# Patient Record
Sex: MALE | Race: WHITE | NOT HISPANIC OR LATINO | Employment: OTHER | URBAN - METROPOLITAN AREA
[De-identification: names, ages, dates, MRNs, and addresses within clinical notes are randomized per-mention and may not be internally consistent; named-entity substitution may affect disease eponyms.]

---

## 2017-01-04 ENCOUNTER — TELEPHONE (OUTPATIENT)
Dept: NEPHROLOGY | Facility: CLINIC | Age: 72
End: 2017-01-04

## 2017-01-04 DIAGNOSIS — N17.9 ACUTE RENAL FAILURE, UNSPECIFIED ACUTE RENAL FAILURE TYPE: Primary | ICD-10-CM

## 2017-01-04 NOTE — TELEPHONE ENCOUNTER
Patient will get labs on Feb 1.  Requested order for RP mailed to his home.  On MD desk for signature with addressed envelope.

## 2017-01-21 LAB
ALBUMIN SERPL-MCNC: 4.7 G/DL (ref 3.5–4.8)
BUN SERPL-MCNC: 18 MG/DL (ref 8–27)
BUN/CREAT SERPL: 12 (ref 10–22)
CALCIUM SERPL-MCNC: 9.3 MG/DL (ref 8.6–10.2)
CHLORIDE SERPL-SCNC: 101 MMOL/L (ref 96–106)
CO2 SERPL-SCNC: 22 MMOL/L (ref 18–29)
CREAT SERPL-MCNC: 1.53 MG/DL (ref 0.76–1.27)
GLUCOSE SERPL-MCNC: 115 MG/DL (ref 65–99)
PHOSPHATE SERPL-MCNC: 3.4 MG/DL (ref 2.5–4.5)
POTASSIUM SERPL-SCNC: 4.8 MMOL/L (ref 3.5–5.2)
SODIUM SERPL-SCNC: 139 MMOL/L (ref 134–144)

## 2017-01-24 ENCOUNTER — PATIENT MESSAGE (OUTPATIENT)
Dept: NEPHROLOGY | Facility: CLINIC | Age: 72
End: 2017-01-24

## 2017-01-24 ENCOUNTER — DOCUMENTATION ONLY (OUTPATIENT)
Dept: NEPHROLOGY | Facility: CLINIC | Age: 72
End: 2017-01-24

## 2017-01-24 NOTE — PROGRESS NOTES
[facility]  1/20/17    Hgb 14.3  Scr 1.47  Na 139  K 4.7  CO 21  Ca 9.3  Alb 4.5    HDL 38  LDL ---  PSA 0.5

## 2018-02-12 ENCOUNTER — PATIENT MESSAGE (OUTPATIENT)
Dept: NEPHROLOGY | Facility: CLINIC | Age: 73
End: 2018-02-12

## 2020-01-02 ENCOUNTER — TELEPHONE (OUTPATIENT)
Dept: NEPHROLOGY | Facility: CLINIC | Age: 75
End: 2020-01-02

## 2020-01-02 NOTE — TELEPHONE ENCOUNTER
----- Message from Scott Cano MD sent at 1/1/2020  8:38 AM CST -----  Please obtain his recent [facility] labs and MRI of his head at [facility] in [place]

## 2020-01-02 NOTE — TELEPHONE ENCOUNTER
Date Name Specimen Result Interpretation Description Value Range Status Address     12/19/2019 PTH (Parathyroid Hormone), Intact, Serum or Plasma       PTH, Intact 20 pg/mL 15-65 pg/mL Final [facility]: [id number], [place]      Glucose, Fingerstick, Blood

## 2020-01-06 ENCOUNTER — TELEPHONE (OUTPATIENT)
Dept: NEPHROLOGY | Facility: CLINIC | Age: 75
End: 2020-01-06

## 2020-01-06 NOTE — TELEPHONE ENCOUNTER
Spoke to Kiki at [facility] and she will have to request labs done over 6 months in their archives, if found, they will be faxed.

## 2020-01-06 NOTE — TELEPHONE ENCOUNTER
----- Message from Scott Cano MD sent at 1/6/2020  6:06 AM CST -----  Can we get his latest three sets of  [facility] labs for review?

## 2020-01-06 NOTE — TELEPHONE ENCOUNTER
----- Message from Scott Cano MD sent at 1/3/2020  7:54 PM CST -----  Please get his latest labs from [facility]

## 2020-01-08 ENCOUNTER — DOCUMENTATION ONLY (OUTPATIENT)
Dept: NEPHROLOGY | Facility: CLINIC | Age: 75
End: 2020-01-08

## 2020-01-14 NOTE — TELEPHONE ENCOUNTER
I spoke to him by phone.  I would simply recheck a renal panel and get a UA.  They will get this at Dr. Medeiros's office.  He will order.

## 2020-02-06 ENCOUNTER — DOCUMENTATION ONLY (OUTPATIENT)
Dept: NEPHROLOGY | Facility: CLINIC | Age: 75
End: 2020-02-06

## 2020-02-07 ENCOUNTER — OFFICE VISIT (OUTPATIENT)
Dept: NEPHROLOGY | Facility: CLINIC | Age: 75
End: 2020-02-07
Payer: MEDICARE

## 2020-02-07 VITALS
OXYGEN SATURATION: 98 % | HEART RATE: 62 BPM | WEIGHT: 171.94 LBS | HEIGHT: 69 IN | BODY MASS INDEX: 25.47 KG/M2 | DIASTOLIC BLOOD PRESSURE: 82 MMHG | SYSTOLIC BLOOD PRESSURE: 128 MMHG

## 2020-02-07 DIAGNOSIS — I10 ESSENTIAL HYPERTENSION: ICD-10-CM

## 2020-02-07 DIAGNOSIS — N20.0 NEPHROLITHIASIS: ICD-10-CM

## 2020-02-07 DIAGNOSIS — N18.30 CHRONIC KIDNEY DISEASE, STAGE III (MODERATE): Primary | ICD-10-CM

## 2020-02-07 PROCEDURE — 3074F PR MOST RECENT SYSTOLIC BLOOD PRESSURE < 130 MM HG: ICD-10-PCS | Mod: CPTII,S$GLB,, | Performed by: INTERNAL MEDICINE

## 2020-02-07 PROCEDURE — 99204 OFFICE O/P NEW MOD 45 MIN: CPT | Mod: S$GLB,,, | Performed by: INTERNAL MEDICINE

## 2020-02-07 PROCEDURE — 99204 PR OFFICE/OUTPT VISIT, NEW, LEVL IV, 45-59 MIN: ICD-10-PCS | Mod: S$GLB,,, | Performed by: INTERNAL MEDICINE

## 2020-02-07 PROCEDURE — 1159F PR MEDICATION LIST DOCUMENTED IN MEDICAL RECORD: ICD-10-PCS | Mod: S$GLB,,, | Performed by: INTERNAL MEDICINE

## 2020-02-07 PROCEDURE — 99999 PR PBB SHADOW E&M-EST. PATIENT-LVL III: ICD-10-PCS | Mod: PBBFAC,,, | Performed by: INTERNAL MEDICINE

## 2020-02-07 PROCEDURE — 99999 PR PBB SHADOW E&M-EST. PATIENT-LVL III: CPT | Mod: PBBFAC,,, | Performed by: INTERNAL MEDICINE

## 2020-02-07 PROCEDURE — 3079F DIAST BP 80-89 MM HG: CPT | Mod: CPTII,S$GLB,, | Performed by: INTERNAL MEDICINE

## 2020-02-07 PROCEDURE — 3079F PR MOST RECENT DIASTOLIC BLOOD PRESSURE 80-89 MM HG: ICD-10-PCS | Mod: CPTII,S$GLB,, | Performed by: INTERNAL MEDICINE

## 2020-02-07 PROCEDURE — 1159F MED LIST DOCD IN RCRD: CPT | Mod: S$GLB,,, | Performed by: INTERNAL MEDICINE

## 2020-02-07 PROCEDURE — 3074F SYST BP LT 130 MM HG: CPT | Mod: CPTII,S$GLB,, | Performed by: INTERNAL MEDICINE

## 2020-02-07 RX ORDER — EZETIMIBE 10 MG/1
10 TABLET ORAL DAILY
COMMUNITY
End: 2021-06-29 | Stop reason: SDUPTHER

## 2020-02-07 RX ORDER — CHOLECALCIFEROL (VITAMIN D3) 25 MCG
1000 TABLET ORAL DAILY
COMMUNITY

## 2020-02-07 RX ORDER — CLOPIDOGREL BISULFATE 75 MG/1
75 TABLET ORAL DAILY
COMMUNITY
End: 2020-05-30 | Stop reason: CLARIF

## 2020-02-07 RX ORDER — GEMFIBROZIL 600 MG/1
600 TABLET, FILM COATED ORAL
COMMUNITY
End: 2021-06-29 | Stop reason: SDUPTHER

## 2020-02-07 RX ORDER — TELMISARTAN 40 MG/1
40 TABLET ORAL DAILY
COMMUNITY

## 2020-02-07 NOTE — PROGRESS NOTES
"Subjective:       Patient ID: Ruy Ramos Jr. is a 75 y.o. White male who presents for return patient evaluation for chronic renal failure.    He has been lost to follow-up since 2017.  He was recently found to have a Scr of 2.3 and also has a carotid artery angiogram next week planned.  He had more than one episode of "passing out" and his evaluation revealed a lesion.  He has no uremic or urinary symptoms and is in his usual state of health.        Review of Systems   Constitutional: Negative for appetite change, chills and fever.   HENT: Negative for congestion.    Eyes: Positive for visual disturbance.   Respiratory: Negative for cough and shortness of breath.    Cardiovascular: Negative for chest pain and leg swelling.   Gastrointestinal: Negative for abdominal pain, diarrhea, nausea and vomiting.   Genitourinary: Negative for difficulty urinating, dysuria and hematuria.   Musculoskeletal: Negative for myalgias.   Skin: Negative for rash.   Neurological: Positive for numbness (feet). Negative for headaches.   Psychiatric/Behavioral: Negative for sleep disturbance.       The past medical, family and social histories were reviewed for this encounter.     /82   Pulse 62   Ht 5' 9" (1.753 m)   Wt 78 kg (171 lb 15.3 oz)   SpO2 98%   BMI 25.39 kg/m²     Objective:      Physical Exam   Constitutional: He appears well-developed and well-nourished. No distress.   HENT:   Head: Normocephalic and atraumatic.   Eyes: Conjunctivae are normal. No scleral icterus.   Neck: Normal range of motion. No JVD present.   Cardiovascular: Normal rate, regular rhythm and normal heart sounds. Exam reveals no gallop and no friction rub.   No murmur heard.  Pulmonary/Chest: Effort normal and breath sounds normal. No respiratory distress. He has no wheezes.   Abdominal: Soft. Bowel sounds are normal. He exhibits no distension. There is no tenderness.   Musculoskeletal: He exhibits no edema.   Skin: Skin is warm and dry. " No rash noted.   Psychiatric: He has a normal mood and affect.   Vitals reviewed.      Assessment:       1. Chronic kidney disease, stage III (moderate)    2. Nephrolithiasis    3. Essential hypertension        Plan:   Return to clinic in 4 months.  Labs for next visit include rp, pth,   Rp, pth, ua, upc today.  He may get these at [place] if he likes.  Baseline creatinine is 1.5.  Please obtain his renal US report from Dr. Mejias.  I would hold the metformin given his GFR.  It is unclear to me if this is acute or chronic.  Recheck labs today and get a urine on him.  Blood pressure is controlled on the current regimen.  Continue current medications as prescribed and reviewed.   Please have patient follow-up with your PCP or Endocrinology regarding the control and management of diabetes.  His upcoming angiogram should not pose a large threat to his renal function but he will receive a small amount of dye.  We discussed this.

## 2020-03-05 ENCOUNTER — HOSPITAL ENCOUNTER (OUTPATIENT)
Dept: RADIOLOGY | Facility: HOSPITAL | Age: 75
Discharge: HOME | End: 2020-03-05
Attending: SPECIALIST
Payer: MEDICARE

## 2020-03-05 DIAGNOSIS — N20.0 URIC ACID NEPHROLITHIASIS: ICD-10-CM

## 2020-03-05 DIAGNOSIS — N20.0 URIC ACID NEPHROLITHIASIS: Primary | ICD-10-CM

## 2020-03-05 PROCEDURE — 74018 RADEX ABDOMEN 1 VIEW: CPT | Mod: TC,PO

## 2020-05-30 ENCOUNTER — ANESTHESIA (OUTPATIENT)
Dept: SURGERY | Facility: HOSPITAL | Age: 75
DRG: 853 | End: 2020-05-30
Payer: MEDICARE

## 2020-05-30 ENCOUNTER — HOSPITAL ENCOUNTER (INPATIENT)
Facility: HOSPITAL | Age: 75
LOS: 4 days | Discharge: HOME | DRG: 853 | End: 2020-06-03
Attending: EMERGENCY MEDICINE | Admitting: INTERNAL MEDICINE
Payer: MEDICARE

## 2020-05-30 ENCOUNTER — ANESTHESIA EVENT (OUTPATIENT)
Dept: SURGERY | Facility: HOSPITAL | Age: 75
DRG: 853 | End: 2020-05-30
Payer: MEDICARE

## 2020-05-30 DIAGNOSIS — K35.33 ACUTE APPENDICITIS WITH PERFORATION, LOCALIZED PERITONITIS, AND ABSCESS, WITHOUT GANGRENE: ICD-10-CM

## 2020-05-30 DIAGNOSIS — R65.20 SEVERE SEPSIS: Primary | ICD-10-CM

## 2020-05-30 DIAGNOSIS — Z01.811 PRE-OP CHEST EXAM: ICD-10-CM

## 2020-05-30 DIAGNOSIS — A41.9 SEVERE SEPSIS: Primary | ICD-10-CM

## 2020-05-30 DIAGNOSIS — K35.80 ACUTE APPENDICITIS: ICD-10-CM

## 2020-05-30 DIAGNOSIS — I48.91 A-FIB: ICD-10-CM

## 2020-05-30 DIAGNOSIS — R07.9 CHEST PAIN: ICD-10-CM

## 2020-05-30 DIAGNOSIS — R61 DIAPHORESIS: ICD-10-CM

## 2020-05-30 PROBLEM — K35.32 ACUTE APPENDICITIS WITH PERFORATION AND LOCALIZED PERITONITIS, WITHOUT ABSCESS OR GANGRENE: Status: ACTIVE | Noted: 2020-05-30

## 2020-05-30 PROBLEM — K37 APPENDICITIS: Status: ACTIVE | Noted: 2020-05-30

## 2020-05-30 PROBLEM — N17.9 AKI (ACUTE KIDNEY INJURY): Status: ACTIVE | Noted: 2020-05-30

## 2020-05-30 LAB
ALBUMIN SERPL BCP-MCNC: 4 G/DL (ref 3.5–5.2)
ALP SERPL-CCNC: 95 U/L (ref 55–135)
ALT SERPL W/O P-5'-P-CCNC: 16 U/L (ref 10–44)
ANION GAP SERPL CALC-SCNC: 13 MMOL/L (ref 8–16)
AST SERPL-CCNC: 17 U/L (ref 10–40)
BASOPHILS # BLD AUTO: 0.03 K/UL (ref 0–0.2)
BASOPHILS NFR BLD: 0.2 % (ref 0–1.9)
BILIRUB SERPL-MCNC: 0.7 MG/DL (ref 0.1–1)
BNP SERPL-MCNC: 36 PG/ML (ref 0–99)
BUN SERPL-MCNC: 37 MG/DL (ref 8–23)
CALCIUM SERPL-MCNC: 9.4 MG/DL (ref 8.7–10.5)
CHLORIDE SERPL-SCNC: 102 MMOL/L (ref 95–110)
CO2 SERPL-SCNC: 19 MMOL/L (ref 23–29)
CREAT SERPL-MCNC: 2.1 MG/DL (ref 0.5–1.4)
DIFFERENTIAL METHOD: ABNORMAL
EOSINOPHIL # BLD AUTO: 0 K/UL (ref 0–0.5)
EOSINOPHIL NFR BLD: 0 % (ref 0–8)
ERYTHROCYTE [DISTWIDTH] IN BLOOD BY AUTOMATED COUNT: 15.3 % (ref 11.5–14.5)
EST. GFR  (AFRICAN AMERICAN): 34.1 ML/MIN/1.73 M^2
EST. GFR  (NON AFRICAN AMERICAN): 29.5 ML/MIN/1.73 M^2
GLUCOSE SERPL-MCNC: 163 MG/DL (ref 70–110)
GLUCOSE SERPL-MCNC: 182 MG/DL (ref 70–110)
GLUCOSE SERPL-MCNC: 184 MG/DL (ref 70–110)
HCT VFR BLD AUTO: 41.4 % (ref 40–54)
HGB BLD-MCNC: 13.5 G/DL (ref 14–18)
IMM GRANULOCYTES # BLD AUTO: 0.07 K/UL (ref 0–0.04)
IMM GRANULOCYTES NFR BLD AUTO: 0.6 % (ref 0–0.5)
LACTATE SERPL-SCNC: 1.5 MMOL/L (ref 0.5–1.9)
LACTATE SERPL-SCNC: 2 MMOL/L (ref 0.5–1.9)
LIPASE SERPL-CCNC: 26 U/L (ref 4–60)
LYMPHOCYTES # BLD AUTO: 0.9 K/UL (ref 1–4.8)
LYMPHOCYTES NFR BLD: 7.6 % (ref 18–48)
MCH RBC QN AUTO: 29.5 PG (ref 27–31)
MCHC RBC AUTO-ENTMCNC: 32.6 G/DL (ref 32–36)
MCV RBC AUTO: 90 FL (ref 82–98)
MONOCYTES # BLD AUTO: 1 K/UL (ref 0.3–1)
MONOCYTES NFR BLD: 7.9 % (ref 4–15)
NEUTROPHILS # BLD AUTO: 10.2 K/UL (ref 1.8–7.7)
NEUTROPHILS NFR BLD: 83.7 % (ref 38–73)
NRBC BLD-RTO: 0 /100 WBC
PLATELET # BLD AUTO: 210 K/UL (ref 150–350)
PMV BLD AUTO: 11.1 FL (ref 9.2–12.9)
POTASSIUM SERPL-SCNC: 4.4 MMOL/L (ref 3.5–5.1)
PROT SERPL-MCNC: 8.1 G/DL (ref 6–8.4)
RBC # BLD AUTO: 4.58 M/UL (ref 4.6–6.2)
SARS-COV-2 RDRP RESP QL NAA+PROBE: NEGATIVE
SODIUM SERPL-SCNC: 134 MMOL/L (ref 136–145)
WBC # BLD AUTO: 12.22 K/UL (ref 3.9–12.7)

## 2020-05-30 PROCEDURE — 27000284 HC CANNULA NASAL: Performed by: ANESTHESIOLOGY

## 2020-05-30 PROCEDURE — 63600175 PHARM REV CODE 636 W HCPCS: Performed by: NURSE ANESTHETIST, CERTIFIED REGISTERED

## 2020-05-30 PROCEDURE — 82962 GLUCOSE BLOOD TEST: CPT | Performed by: SURGERY

## 2020-05-30 PROCEDURE — 27000671 HC TUBING MICROBORE EXT: Performed by: ANESTHESIOLOGY

## 2020-05-30 PROCEDURE — 27201423 OPTIME MED/SURG SUP & DEVICES STERILE SUPPLY: Performed by: SURGERY

## 2020-05-30 PROCEDURE — 44950 APPENDECTOMY: CPT | Mod: ,,, | Performed by: SURGERY

## 2020-05-30 PROCEDURE — 71000033 HC RECOVERY, INTIAL HOUR: Performed by: SURGERY

## 2020-05-30 PROCEDURE — C9290 INJ, BUPIVACAINE LIPOSOME: HCPCS | Performed by: SURGERY

## 2020-05-30 PROCEDURE — 87185 SC STD ENZYME DETCJ PER NZM: CPT

## 2020-05-30 PROCEDURE — 85025 COMPLETE CBC W/AUTO DIFF WBC: CPT

## 2020-05-30 PROCEDURE — U0002 COVID-19 LAB TEST NON-CDC: HCPCS

## 2020-05-30 PROCEDURE — 36415 COLL VENOUS BLD VENIPUNCTURE: CPT

## 2020-05-30 PROCEDURE — 83605 ASSAY OF LACTIC ACID: CPT | Mod: 91

## 2020-05-30 PROCEDURE — 25000003 PHARM REV CODE 250: Performed by: SURGERY

## 2020-05-30 PROCEDURE — S0028 INJECTION, FAMOTIDINE, 20 MG: HCPCS | Performed by: NURSE ANESTHETIST, CERTIFIED REGISTERED

## 2020-05-30 PROCEDURE — 63600175 PHARM REV CODE 636 W HCPCS: Performed by: SURGERY

## 2020-05-30 PROCEDURE — 63600175 PHARM REV CODE 636 W HCPCS: Performed by: NURSE PRACTITIONER

## 2020-05-30 PROCEDURE — 21000000 HC CCU ICU ROOM CHARGE

## 2020-05-30 PROCEDURE — 25000003 PHARM REV CODE 250: Performed by: NURSE ANESTHETIST, CERTIFIED REGISTERED

## 2020-05-30 PROCEDURE — 99285 EMERGENCY DEPT VISIT HI MDM: CPT | Mod: 25

## 2020-05-30 PROCEDURE — 87040 BLOOD CULTURE FOR BACTERIA: CPT | Mod: 59

## 2020-05-30 PROCEDURE — 37000009 HC ANESTHESIA EA ADD 15 MINS: Performed by: SURGERY

## 2020-05-30 PROCEDURE — 37000008 HC ANESTHESIA 1ST 15 MINUTES: Performed by: SURGERY

## 2020-05-30 PROCEDURE — 27202103: Performed by: ANESTHESIOLOGY

## 2020-05-30 PROCEDURE — 25000003 PHARM REV CODE 250: Performed by: NURSE PRACTITIONER

## 2020-05-30 PROCEDURE — 96375 TX/PRO/DX INJ NEW DRUG ADDON: CPT

## 2020-05-30 PROCEDURE — 80053 COMPREHEN METABOLIC PANEL: CPT

## 2020-05-30 PROCEDURE — 27201107 HC STYLET, STANDARD: Performed by: ANESTHESIOLOGY

## 2020-05-30 PROCEDURE — 93005 ELECTROCARDIOGRAM TRACING: CPT | Performed by: INTERNAL MEDICINE

## 2020-05-30 PROCEDURE — 36000708 HC OR TIME LEV III 1ST 15 MIN: Performed by: SURGERY

## 2020-05-30 PROCEDURE — 44950 PR APPENDECTOMY: ICD-10-PCS | Mod: ,,, | Performed by: SURGERY

## 2020-05-30 PROCEDURE — 82962 GLUCOSE BLOOD TEST: CPT

## 2020-05-30 PROCEDURE — 25000003 PHARM REV CODE 250: Performed by: EMERGENCY MEDICINE

## 2020-05-30 PROCEDURE — 83880 ASSAY OF NATRIURETIC PEPTIDE: CPT

## 2020-05-30 PROCEDURE — 63600175 PHARM REV CODE 636 W HCPCS: Performed by: EMERGENCY MEDICINE

## 2020-05-30 PROCEDURE — 27202107 HC XP QUATRO SENSOR: Performed by: ANESTHESIOLOGY

## 2020-05-30 PROCEDURE — 36000709 HC OR TIME LEV III EA ADD 15 MIN: Performed by: SURGERY

## 2020-05-30 PROCEDURE — 96365 THER/PROPH/DIAG IV INF INIT: CPT

## 2020-05-30 PROCEDURE — 83690 ASSAY OF LIPASE: CPT

## 2020-05-30 PROCEDURE — 96361 HYDRATE IV INFUSION ADD-ON: CPT

## 2020-05-30 PROCEDURE — 88304 TISSUE EXAM BY PATHOLOGIST: CPT | Mod: TC

## 2020-05-30 PROCEDURE — 27000673 HC TUBING BLOOD Y: Performed by: ANESTHESIOLOGY

## 2020-05-30 RX ORDER — LIDOCAINE HYDROCHLORIDE 20 MG/ML
INJECTION, SOLUTION EPIDURAL; INFILTRATION; INTRACAUDAL; PERINEURAL
Status: DISCONTINUED | OUTPATIENT
Start: 2020-05-30 | End: 2020-05-30

## 2020-05-30 RX ORDER — ACETAMINOPHEN 10 MG/ML
INJECTION, SOLUTION INTRAVENOUS
Status: DISCONTINUED | OUTPATIENT
Start: 2020-05-30 | End: 2020-05-30

## 2020-05-30 RX ORDER — ROCURONIUM BROMIDE 10 MG/ML
INJECTION, SOLUTION INTRAVENOUS
Status: DISCONTINUED | OUTPATIENT
Start: 2020-05-30 | End: 2020-05-30

## 2020-05-30 RX ORDER — NEOSTIGMINE METHYLSULFATE 1 MG/ML
INJECTION, SOLUTION INTRAVENOUS
Status: DISCONTINUED | OUTPATIENT
Start: 2020-05-30 | End: 2020-05-30

## 2020-05-30 RX ORDER — BUPIVACAINE HYDROCHLORIDE AND EPINEPHRINE 2.5; 5 MG/ML; UG/ML
INJECTION, SOLUTION EPIDURAL; INFILTRATION; INTRACAUDAL; PERINEURAL
Status: DISCONTINUED | OUTPATIENT
Start: 2020-05-30 | End: 2020-05-30 | Stop reason: HOSPADM

## 2020-05-30 RX ORDER — SODIUM CHLORIDE 0.9 % (FLUSH) 0.9 %
10 SYRINGE (ML) INJECTION
Status: DISCONTINUED | OUTPATIENT
Start: 2020-05-30 | End: 2020-06-03 | Stop reason: HOSPADM

## 2020-05-30 RX ORDER — HYDROMORPHONE HYDROCHLORIDE 1 MG/ML
1 INJECTION, SOLUTION INTRAMUSCULAR; INTRAVENOUS; SUBCUTANEOUS EVERY 6 HOURS PRN
Status: DISCONTINUED | OUTPATIENT
Start: 2020-05-30 | End: 2020-06-03 | Stop reason: HOSPADM

## 2020-05-30 RX ORDER — FAMOTIDINE 10 MG/ML
INJECTION INTRAVENOUS
Status: DISCONTINUED | OUTPATIENT
Start: 2020-05-30 | End: 2020-05-30

## 2020-05-30 RX ORDER — GLYCOPYRROLATE 0.2 MG/ML
INJECTION INTRAMUSCULAR; INTRAVENOUS
Status: DISCONTINUED | OUTPATIENT
Start: 2020-05-30 | End: 2020-05-30

## 2020-05-30 RX ORDER — FLUCONAZOLE 2 MG/ML
100 INJECTION, SOLUTION INTRAVENOUS
Status: DISCONTINUED | OUTPATIENT
Start: 2020-05-31 | End: 2020-06-03 | Stop reason: HOSPADM

## 2020-05-30 RX ORDER — GLUCAGON 1 MG
1 KIT INJECTION
Status: DISCONTINUED | OUTPATIENT
Start: 2020-05-30 | End: 2020-06-03 | Stop reason: HOSPADM

## 2020-05-30 RX ORDER — SODIUM CHLORIDE 0.9 % (FLUSH) 0.9 %
10 SYRINGE (ML) INJECTION
Status: DISCONTINUED | OUTPATIENT
Start: 2020-05-31 | End: 2020-06-03 | Stop reason: HOSPADM

## 2020-05-30 RX ORDER — ONDANSETRON 2 MG/ML
4 INJECTION INTRAMUSCULAR; INTRAVENOUS DAILY PRN
Status: DISCONTINUED | OUTPATIENT
Start: 2020-05-30 | End: 2020-05-31

## 2020-05-30 RX ORDER — ACETAMINOPHEN 325 MG/1
650 TABLET ORAL EVERY 8 HOURS PRN
Status: DISCONTINUED | OUTPATIENT
Start: 2020-05-30 | End: 2020-06-03 | Stop reason: HOSPADM

## 2020-05-30 RX ORDER — IBUPROFEN 200 MG
16 TABLET ORAL
Status: DISCONTINUED | OUTPATIENT
Start: 2020-05-30 | End: 2020-06-03 | Stop reason: HOSPADM

## 2020-05-30 RX ORDER — OXYCODONE HYDROCHLORIDE 5 MG/1
5 TABLET ORAL
Status: DISCONTINUED | OUTPATIENT
Start: 2020-05-30 | End: 2020-05-31

## 2020-05-30 RX ORDER — IBUPROFEN 200 MG
24 TABLET ORAL
Status: DISCONTINUED | OUTPATIENT
Start: 2020-05-30 | End: 2020-06-03 | Stop reason: HOSPADM

## 2020-05-30 RX ORDER — INSULIN ASPART 100 [IU]/ML
0-5 INJECTION, SOLUTION INTRAVENOUS; SUBCUTANEOUS EVERY 6 HOURS PRN
Status: DISCONTINUED | OUTPATIENT
Start: 2020-05-30 | End: 2020-06-03 | Stop reason: HOSPADM

## 2020-05-30 RX ORDER — SUCCINYLCHOLINE CHLORIDE 20 MG/ML
INJECTION INTRAMUSCULAR; INTRAVENOUS
Status: DISCONTINUED | OUTPATIENT
Start: 2020-05-30 | End: 2020-05-30

## 2020-05-30 RX ORDER — HYDROMORPHONE HYDROCHLORIDE 1 MG/ML
0.5 INJECTION, SOLUTION INTRAMUSCULAR; INTRAVENOUS; SUBCUTANEOUS
Status: COMPLETED | OUTPATIENT
Start: 2020-05-30 | End: 2020-05-30

## 2020-05-30 RX ORDER — SODIUM CHLORIDE, SODIUM LACTATE, POTASSIUM CHLORIDE, CALCIUM CHLORIDE 600; 310; 30; 20 MG/100ML; MG/100ML; MG/100ML; MG/100ML
INJECTION, SOLUTION INTRAVENOUS CONTINUOUS PRN
Status: DISCONTINUED | OUTPATIENT
Start: 2020-05-30 | End: 2020-05-30

## 2020-05-30 RX ORDER — EPHEDRINE SULFATE 50 MG/ML
INJECTION, SOLUTION INTRAVENOUS
Status: DISCONTINUED | OUTPATIENT
Start: 2020-05-30 | End: 2020-05-30

## 2020-05-30 RX ORDER — DEXAMETHASONE SODIUM PHOSPHATE 4 MG/ML
INJECTION, SOLUTION INTRA-ARTICULAR; INTRALESIONAL; INTRAMUSCULAR; INTRAVENOUS; SOFT TISSUE
Status: DISCONTINUED | OUTPATIENT
Start: 2020-05-30 | End: 2020-05-30

## 2020-05-30 RX ORDER — SODIUM CHLORIDE 9 MG/ML
INJECTION, SOLUTION INTRAVENOUS CONTINUOUS
Status: DISCONTINUED | OUTPATIENT
Start: 2020-05-30 | End: 2020-06-01

## 2020-05-30 RX ORDER — PROPOFOL 10 MG/ML
VIAL (ML) INTRAVENOUS
Status: DISCONTINUED | OUTPATIENT
Start: 2020-05-30 | End: 2020-05-30

## 2020-05-30 RX ORDER — PHENYLEPHRINE HYDROCHLORIDE 10 MG/ML
INJECTION INTRAVENOUS
Status: DISCONTINUED | OUTPATIENT
Start: 2020-05-30 | End: 2020-05-30

## 2020-05-30 RX ORDER — MORPHINE SULFATE 2 MG/ML
1 INJECTION, SOLUTION INTRAMUSCULAR; INTRAVENOUS EVERY 4 HOURS PRN
Status: DISCONTINUED | OUTPATIENT
Start: 2020-05-30 | End: 2020-06-03 | Stop reason: HOSPADM

## 2020-05-30 RX ORDER — FENTANYL CITRATE 50 UG/ML
25 INJECTION, SOLUTION INTRAMUSCULAR; INTRAVENOUS
Status: DISCONTINUED | OUTPATIENT
Start: 2020-05-30 | End: 2020-05-31

## 2020-05-30 RX ORDER — PIOGLITAZONEHYDROCHLORIDE 15 MG/1
15 TABLET ORAL DAILY
COMMUNITY
End: 2021-09-28 | Stop reason: SDUPTHER

## 2020-05-30 RX ORDER — ONDANSETRON 2 MG/ML
INJECTION INTRAMUSCULAR; INTRAVENOUS
Status: DISCONTINUED | OUTPATIENT
Start: 2020-05-30 | End: 2020-05-30

## 2020-05-30 RX ORDER — DIPHENHYDRAMINE HYDROCHLORIDE 50 MG/ML
12.5 INJECTION INTRAMUSCULAR; INTRAVENOUS
Status: DISCONTINUED | OUTPATIENT
Start: 2020-05-30 | End: 2020-05-31

## 2020-05-30 RX ORDER — SODIUM CHLORIDE 9 MG/ML
INJECTION, SOLUTION INTRAVENOUS CONTINUOUS PRN
Status: DISCONTINUED | OUTPATIENT
Start: 2020-05-30 | End: 2020-05-30

## 2020-05-30 RX ORDER — ONDANSETRON 2 MG/ML
4 INJECTION INTRAMUSCULAR; INTRAVENOUS EVERY 8 HOURS PRN
Status: DISCONTINUED | OUTPATIENT
Start: 2020-05-30 | End: 2020-06-03 | Stop reason: HOSPADM

## 2020-05-30 RX ORDER — FENTANYL CITRATE 50 UG/ML
INJECTION, SOLUTION INTRAMUSCULAR; INTRAVENOUS
Status: DISCONTINUED | OUTPATIENT
Start: 2020-05-30 | End: 2020-05-30

## 2020-05-30 RX ORDER — MEPERIDINE HYDROCHLORIDE 50 MG/ML
12.5 INJECTION INTRAMUSCULAR; INTRAVENOUS; SUBCUTANEOUS EVERY 10 MIN PRN
Status: ACTIVE | OUTPATIENT
Start: 2020-05-30 | End: 2020-05-30

## 2020-05-30 RX ORDER — LIDOCAINE HYDROCHLORIDE 20 MG/ML
JELLY TOPICAL
Status: DISCONTINUED | OUTPATIENT
Start: 2020-05-30 | End: 2020-05-30

## 2020-05-30 RX ORDER — MIDAZOLAM HYDROCHLORIDE 1 MG/ML
INJECTION INTRAMUSCULAR; INTRAVENOUS
Status: DISCONTINUED | OUTPATIENT
Start: 2020-05-30 | End: 2020-05-30

## 2020-05-30 RX ADMIN — SODIUM CHLORIDE: 900 INJECTION, SOLUTION INTRAVENOUS at 09:05

## 2020-05-30 RX ADMIN — SODIUM CHLORIDE 1000 ML: 0.9 INJECTION, SOLUTION INTRAVENOUS at 03:05

## 2020-05-30 RX ADMIN — SODIUM CHLORIDE 1000 ML: 9 INJECTION, SOLUTION INTRAVENOUS at 07:05

## 2020-05-30 RX ADMIN — ACETAMINOPHEN 1000 MG: 10 INJECTION, SOLUTION INTRAVENOUS at 09:05

## 2020-05-30 RX ADMIN — GLYCOPYRROLATE 0.6 MG: 0.2 INJECTION INTRAMUSCULAR; INTRAVENOUS at 11:05

## 2020-05-30 RX ADMIN — FAMOTIDINE 20 MG: 10 INJECTION, SOLUTION INTRAVENOUS at 09:05

## 2020-05-30 RX ADMIN — PIPERACILLIN AND TAZOBACTAM 4.5 G: 4; .5 INJECTION, POWDER, LYOPHILIZED, FOR SOLUTION INTRAVENOUS; PARENTERAL at 06:05

## 2020-05-30 RX ADMIN — SODIUM CHLORIDE, SODIUM LACTATE, POTASSIUM CHLORIDE, AND CALCIUM CHLORIDE: .6; .31; .03; .02 INJECTION, SOLUTION INTRAVENOUS at 09:05

## 2020-05-30 RX ADMIN — EPHEDRINE SULFATE 10 MG: 50 INJECTION, SOLUTION INTRAVENOUS at 09:05

## 2020-05-30 RX ADMIN — ROCURONIUM BROMIDE 10 MG: 10 INJECTION, SOLUTION INTRAVENOUS at 09:05

## 2020-05-30 RX ADMIN — PHENYLEPHRINE HYDROCHLORIDE 200 MCG: 10 INJECTION INTRAVENOUS at 09:05

## 2020-05-30 RX ADMIN — LIDOCAINE HYDROCHLORIDE 80 MG: 20 INJECTION, SOLUTION INTRAVENOUS at 09:05

## 2020-05-30 RX ADMIN — ROCURONIUM BROMIDE 15 MG: 10 INJECTION, SOLUTION INTRAVENOUS at 09:05

## 2020-05-30 RX ADMIN — NEOSTIGMINE METHYLSULFATE 4 MG: 1 INJECTION INTRAVENOUS at 11:05

## 2020-05-30 RX ADMIN — SUCCINYLCHOLINE CHLORIDE 120 MG: 20 INJECTION, SOLUTION INTRAMUSCULAR; INTRAVENOUS at 09:05

## 2020-05-30 RX ADMIN — PROPOFOL 120 MG: 10 INJECTION, EMULSION INTRAVENOUS at 09:05

## 2020-05-30 RX ADMIN — MIDAZOLAM HYDROCHLORIDE 1 MG: 1 INJECTION, SOLUTION INTRAMUSCULAR; INTRAVENOUS at 09:05

## 2020-05-30 RX ADMIN — LIDOCAINE HYDROCHLORIDE 3 ML: 20 JELLY TOPICAL at 09:05

## 2020-05-30 RX ADMIN — HYDROMORPHONE HYDROCHLORIDE 1 MG: 1 INJECTION, SOLUTION INTRAMUSCULAR; INTRAVENOUS; SUBCUTANEOUS at 08:05

## 2020-05-30 RX ADMIN — FENTANYL CITRATE 50 MCG: 50 INJECTION INTRAMUSCULAR; INTRAVENOUS at 09:05

## 2020-05-30 RX ADMIN — ROCURONIUM BROMIDE 5 MG: 10 INJECTION, SOLUTION INTRAVENOUS at 09:05

## 2020-05-30 RX ADMIN — DEXAMETHASONE SODIUM PHOSPHATE 8 MG: 4 INJECTION, SOLUTION INTRAMUSCULAR; INTRAVENOUS at 09:05

## 2020-05-30 RX ADMIN — HYDROMORPHONE HYDROCHLORIDE 0.5 MG: 1 INJECTION, SOLUTION INTRAMUSCULAR; INTRAVENOUS; SUBCUTANEOUS at 03:05

## 2020-05-30 RX ADMIN — ONDANSETRON 4 MG: 2 INJECTION INTRAMUSCULAR; INTRAVENOUS at 09:05

## 2020-05-30 RX ADMIN — PHENYLEPHRINE HYDROCHLORIDE 300 MCG: 10 INJECTION INTRAVENOUS at 09:05

## 2020-05-30 RX ADMIN — SODIUM CHLORIDE: 0.9 INJECTION, SOLUTION INTRAVENOUS at 08:05

## 2020-05-30 RX ADMIN — PROPOFOL 30 MG: 10 INJECTION, EMULSION INTRAVENOUS at 10:05

## 2020-05-30 RX ADMIN — PROPOFOL 30 MG: 10 INJECTION, EMULSION INTRAVENOUS at 09:05

## 2020-05-31 LAB
ALBUMIN SERPL BCP-MCNC: 3.1 G/DL (ref 3.5–5.2)
ALP SERPL-CCNC: 63 U/L (ref 55–135)
ALT SERPL W/O P-5'-P-CCNC: 17 U/L (ref 10–44)
ANION GAP SERPL CALC-SCNC: 10 MMOL/L (ref 8–16)
AST SERPL-CCNC: 18 U/L (ref 10–40)
BACTERIA #/AREA URNS HPF: ABNORMAL /HPF
BASOPHILS NFR BLD: 0 % (ref 0–1.9)
BILIRUB SERPL-MCNC: 0.9 MG/DL (ref 0.1–1)
BILIRUB UR QL STRIP: NEGATIVE
BUN SERPL-MCNC: 35 MG/DL (ref 8–23)
CALCIUM SERPL-MCNC: 8.4 MG/DL (ref 8.7–10.5)
CHLORIDE SERPL-SCNC: 109 MMOL/L (ref 95–110)
CLARITY UR: CLEAR
CO2 SERPL-SCNC: 16 MMOL/L (ref 23–29)
COLOR UR: YELLOW
CREAT SERPL-MCNC: 1.9 MG/DL (ref 0.5–1.4)
DIFFERENTIAL METHOD: ABNORMAL
EOSINOPHIL NFR BLD: 0 % (ref 0–8)
ERYTHROCYTE [DISTWIDTH] IN BLOOD BY AUTOMATED COUNT: 15.1 % (ref 11.5–14.5)
EST. GFR  (AFRICAN AMERICAN): 38.5 ML/MIN/1.73 M^2
EST. GFR  (NON AFRICAN AMERICAN): 33.3 ML/MIN/1.73 M^2
GLUCOSE SERPL-MCNC: 129 MG/DL (ref 70–110)
GLUCOSE SERPL-MCNC: 146 MG/DL (ref 70–110)
GLUCOSE SERPL-MCNC: 150 MG/DL (ref 70–110)
GLUCOSE SERPL-MCNC: 162 MG/DL (ref 70–110)
GLUCOSE SERPL-MCNC: 214 MG/DL (ref 70–110)
GLUCOSE UR QL STRIP: ABNORMAL
HCT VFR BLD AUTO: 36.9 % (ref 40–54)
HGB BLD-MCNC: 11.9 G/DL (ref 14–18)
HGB UR QL STRIP: ABNORMAL
HYALINE CASTS #/AREA URNS LPF: 5 /LPF
IMM GRANULOCYTES # BLD AUTO: ABNORMAL K/UL (ref 0–0.04)
IMM GRANULOCYTES NFR BLD AUTO: ABNORMAL % (ref 0–0.5)
KETONES UR QL STRIP: NEGATIVE
LEUKOCYTE ESTERASE UR QL STRIP: NEGATIVE
LYMPHOCYTES NFR BLD: 5 % (ref 18–48)
MAGNESIUM SERPL-MCNC: 1.8 MG/DL (ref 1.6–2.6)
MCH RBC QN AUTO: 29.3 PG (ref 27–31)
MCHC RBC AUTO-ENTMCNC: 32.2 G/DL (ref 32–36)
MCV RBC AUTO: 91 FL (ref 82–98)
MICROSCOPIC COMMENT: ABNORMAL
MONOCYTES NFR BLD: 4 % (ref 4–15)
NEUTROPHILS NFR BLD: 61 % (ref 38–73)
NEUTS BAND NFR BLD MANUAL: 30 %
NITRITE UR QL STRIP: NEGATIVE
NRBC BLD-RTO: 0 /100 WBC
PH UR STRIP: 6 [PH] (ref 5–8)
PHOSPHATE SERPL-MCNC: 4.4 MG/DL (ref 2.7–4.5)
PLATELET # BLD AUTO: 162 K/UL (ref 150–350)
PMV BLD AUTO: 11.3 FL (ref 9.2–12.9)
POTASSIUM SERPL-SCNC: 4.7 MMOL/L (ref 3.5–5.1)
PROT SERPL-MCNC: 6.6 G/DL (ref 6–8.4)
PROT UR QL STRIP: ABNORMAL
RBC # BLD AUTO: 4.06 M/UL (ref 4.6–6.2)
RBC #/AREA URNS HPF: 3 /HPF (ref 0–4)
SODIUM SERPL-SCNC: 135 MMOL/L (ref 136–145)
SP GR UR STRIP: 1.02 (ref 1–1.03)
SQUAMOUS #/AREA URNS HPF: 1 /HPF
URN SPEC COLLECT METH UR: ABNORMAL
UROBILINOGEN UR STRIP-ACNC: NEGATIVE EU/DL
WBC # BLD AUTO: 7.01 K/UL (ref 3.9–12.7)
WBC #/AREA URNS HPF: 1 /HPF (ref 0–5)
YEAST URNS QL MICRO: ABNORMAL

## 2020-05-31 PROCEDURE — 99900035 HC TECH TIME PER 15 MIN (STAT)

## 2020-05-31 PROCEDURE — 27000221 HC OXYGEN, UP TO 24 HOURS

## 2020-05-31 PROCEDURE — 25000003 PHARM REV CODE 250: Performed by: NURSE PRACTITIONER

## 2020-05-31 PROCEDURE — 81001 URINALYSIS AUTO W/SCOPE: CPT

## 2020-05-31 PROCEDURE — 83735 ASSAY OF MAGNESIUM: CPT

## 2020-05-31 PROCEDURE — 84100 ASSAY OF PHOSPHORUS: CPT

## 2020-05-31 PROCEDURE — 63600175 PHARM REV CODE 636 W HCPCS: Performed by: INTERNAL MEDICINE

## 2020-05-31 PROCEDURE — 94761 N-INVAS EAR/PLS OXIMETRY MLT: CPT

## 2020-05-31 PROCEDURE — 85027 COMPLETE CBC AUTOMATED: CPT

## 2020-05-31 PROCEDURE — 25000003 PHARM REV CODE 250: Performed by: SURGERY

## 2020-05-31 PROCEDURE — 63600175 PHARM REV CODE 636 W HCPCS: Performed by: NURSE PRACTITIONER

## 2020-05-31 PROCEDURE — 63600175 PHARM REV CODE 636 W HCPCS: Performed by: SURGERY

## 2020-05-31 PROCEDURE — 94799 UNLISTED PULMONARY SVC/PX: CPT

## 2020-05-31 PROCEDURE — 80053 COMPREHEN METABOLIC PANEL: CPT

## 2020-05-31 PROCEDURE — 85007 BL SMEAR W/DIFF WBC COUNT: CPT

## 2020-05-31 PROCEDURE — 21000000 HC CCU ICU ROOM CHARGE

## 2020-05-31 PROCEDURE — 36415 COLL VENOUS BLD VENIPUNCTURE: CPT

## 2020-05-31 RX ORDER — HYDROMORPHONE HYDROCHLORIDE 1 MG/ML
1 INJECTION, SOLUTION INTRAMUSCULAR; INTRAVENOUS; SUBCUTANEOUS EVERY 4 HOURS PRN
Status: DISCONTINUED | OUTPATIENT
Start: 2020-05-31 | End: 2020-06-01

## 2020-05-31 RX ORDER — HYDROCODONE BITARTRATE AND ACETAMINOPHEN 5; 325 MG/1; MG/1
1 TABLET ORAL EVERY 4 HOURS PRN
Status: DISCONTINUED | OUTPATIENT
Start: 2020-05-31 | End: 2020-06-03 | Stop reason: HOSPADM

## 2020-05-31 RX ORDER — MUPIROCIN 20 MG/G
1 OINTMENT TOPICAL 2 TIMES DAILY
Status: DISCONTINUED | OUTPATIENT
Start: 2020-05-31 | End: 2020-06-03 | Stop reason: HOSPADM

## 2020-05-31 RX ORDER — PANTOPRAZOLE SODIUM 40 MG/1
40 TABLET, DELAYED RELEASE ORAL
Status: DISCONTINUED | OUTPATIENT
Start: 2020-05-31 | End: 2020-06-03 | Stop reason: HOSPADM

## 2020-05-31 RX ORDER — ONDANSETRON 4 MG/1
8 TABLET, ORALLY DISINTEGRATING ORAL EVERY 8 HOURS PRN
Status: DISCONTINUED | OUTPATIENT
Start: 2020-05-31 | End: 2020-06-03 | Stop reason: HOSPADM

## 2020-05-31 RX ORDER — ENOXAPARIN SODIUM 100 MG/ML
30 INJECTION SUBCUTANEOUS
Status: DISCONTINUED | OUTPATIENT
Start: 2020-05-31 | End: 2020-06-01

## 2020-05-31 RX ORDER — ENOXAPARIN SODIUM 100 MG/ML
40 INJECTION SUBCUTANEOUS
Status: DISCONTINUED | OUTPATIENT
Start: 2020-05-31 | End: 2020-05-31

## 2020-05-31 RX ORDER — POLYETHYLENE GLYCOL 3350 17 G/17G
17 POWDER, FOR SOLUTION ORAL DAILY
Status: DISCONTINUED | OUTPATIENT
Start: 2020-05-31 | End: 2020-06-02

## 2020-05-31 RX ADMIN — HYDROCODONE BITARTRATE AND ACETAMINOPHEN 1 TABLET: 5; 325 TABLET ORAL at 05:05

## 2020-05-31 RX ADMIN — PIPERACILLIN SODIUM AND TAZOBACTAM SODIUM 4.5 G: 4; .5 INJECTION, POWDER, LYOPHILIZED, FOR SOLUTION INTRAVENOUS at 10:05

## 2020-05-31 RX ADMIN — SODIUM CHLORIDE: 0.9 INJECTION, SOLUTION INTRAVENOUS at 01:05

## 2020-05-31 RX ADMIN — PIPERACILLIN SODIUM AND TAZOBACTAM SODIUM 4.5 G: 4; .5 INJECTION, POWDER, LYOPHILIZED, FOR SOLUTION INTRAVENOUS at 07:05

## 2020-05-31 RX ADMIN — PIPERACILLIN SODIUM AND TAZOBACTAM SODIUM 4.5 G: 4; .5 INJECTION, POWDER, LYOPHILIZED, FOR SOLUTION INTRAVENOUS at 04:05

## 2020-05-31 RX ADMIN — HYDROCODONE BITARTRATE AND ACETAMINOPHEN 1 TABLET: 5; 325 TABLET ORAL at 01:05

## 2020-05-31 RX ADMIN — POLYETHYLENE GLYCOL 3350 17 G: 17 POWDER, FOR SOLUTION ORAL at 12:05

## 2020-05-31 RX ADMIN — HYDROMORPHONE HYDROCHLORIDE 1 MG: 1 INJECTION, SOLUTION INTRAMUSCULAR; INTRAVENOUS; SUBCUTANEOUS at 11:05

## 2020-05-31 RX ADMIN — MUPIROCIN 1 G: 20 OINTMENT TOPICAL at 08:05

## 2020-05-31 RX ADMIN — ENOXAPARIN SODIUM 30 MG: 30 INJECTION SUBCUTANEOUS at 07:05

## 2020-05-31 RX ADMIN — MUPIROCIN 1 G: 20 OINTMENT TOPICAL at 09:05

## 2020-05-31 RX ADMIN — MUPIROCIN 1 G: 20 OINTMENT TOPICAL at 01:05

## 2020-05-31 RX ADMIN — HYDROCODONE BITARTRATE AND ACETAMINOPHEN 1 TABLET: 5; 325 TABLET ORAL at 08:05

## 2020-05-31 RX ADMIN — FLUCONAZOLE 100 MG: 2 INJECTION, SOLUTION INTRAVENOUS at 01:05

## 2020-05-31 RX ADMIN — PANTOPRAZOLE SODIUM 40 MG: 40 TABLET, DELAYED RELEASE ORAL at 05:05

## 2020-05-31 NOTE — PROGRESS NOTES
[facility] Medicine    Progress Note    Patient Name: Ruy Ramos Jr.  MRN: [id number]  Patient Class: IP- Inpatient   Admission Date: 5/30/2020  3:03 PM  Length of Stay: 1  Attending Physician: JR JAMISON MD  Primary Care Provider: Kanu Medeiros MD  Face-to-Face encounter date: 05/31/2020  Chief Complaint: Groin Pain (RT ,ONSET THURSDAY)         Patient ID: Ruy Ramos Jr. is a 75 y.o. male admitted for   Active Hospital Problems    Diagnosis  POA    *Acute appendicitis with perforation, localized peritonitis, and abscess, without gangrene [K35.33]  Yes    Severe sepsis [A41.9, R65.20]  Yes    KINJAL (acute kidney injury) [N17.9]  Yes    Acute appendicitis [K35.80]  Yes    Type 2 diabetes mellitus without complication [E11.9]  Yes      Resolved Hospital Problems   No resolved problems to display.      HISTORY OF PRESENT ILLNESS by Palma Serrano NP 05/30/2020:   Ruy Ramos Jr. is a 75 y.o. old male who  has a past medical history of Diabetes mellitus, Hypertension, and Kidney stone.. The patient presented to [facility] on 5/30/2020 with a primary complaint of Groin Pain (RT ,ONSET THURSDAY)     75-year-old  male presents emergency room with fever and abdominal pain.    The patient states for the last 2-3 days he has been having fever, chills and right lower quadrant abdominal pain.  He was also having right chest rib pain.  The patient describes his pain as a sharp pressure sensation that worsens with palpation and movement.    The patient states he had been taking Tylenol and ibuprofen without improvement in his symptoms.    The patient states today he had a temperature of a 102° and his right lower quadrant abdominal pain got progressively and intensely worse.  This concerned him so he came to the emergency room for further evaluation.   Upon arrival in emergency room the patient was found to be hypotensive febrile and tachycardic.  He  was given IV fluid boluses and diagnostic imaging was ordered.   A CT of the abdomen and pelvis did reveal an acute appendicitis with microperforations.  Surgery was consulted by the ER physician and the plan is for emergent appendectomy.  The patient also had acute kidney injury most likely sepsis secondary to infection and nephrotoxin medications complicated by dehydration.  IV antibiotics were started in the emergency room      COVID-19 test was negative    Subjective:    Interval History: Patient seen and examined this morning.  Patient sitting up in bed and eating his Jell-O.  Reports his abdominal pain is well controlled right now.  Has been tolerating clear liquids.  Denied nausea or vomiting.   No Family present at bedside.  No concerns/issues overnight reported by the patient or the nursing staff.    Review of Systems All other Review of Systems were found to be negative expect for that mentioned already in HPI.     Objective:     Physical Exam  Vitals:    05/31/20 0317   BP: 131/73   Pulse: 78   Resp: 18   Temp: 98.2 °F (36.8 °C)     Wt Readings from Last 1 Encounters:   05/31/20 72.6 kg (160 lb 0.9 oz)      Body mass index is 23.64 kg/m².    Vitals reviewed.  Constitutional: No distress.  Sitting comfortably in bed  HENT: NC, not on supplemental oxygen  Head: Atraumatic.   Cardiovascular: Normal rate, regular rhythm and normal heart sounds.   Pulmonary/Chest: Effort normal. No wheezes.   Abdominal:  abdominal girdle on, LESLIE drain noted in the left lower quadrant with serosanguineous drainage.  Appropriately tender Bowel sounds present.   Neurological: Alert.   Skin: Skin is warm and dry.   Psych: Appropriate mood and affect    Following labs were Reviewed   CBC:  Recent Labs   Lab 05/31/20  0328   WBC 7.01   HGB 11.9*   HCT 36.9*        CMP:  Recent Labs   Lab 05/31/20  0328   CALCIUM 8.4*   ALBUMIN 3.1*   PROT 6.6   *   K 4.7   CO2 16*      BUN 35*   CREATININE 1.9*   ALKPHOS 63   ALT  17   AST 18   BILITOT 0.9     Last 72 hour POCT GLUCOSE  No results found for: POCTGLUCOSE     Microbiology Results (last 7 days)     Procedure Component Value Units Date/Time    Blood Culture #1 **CANNOT BE ORDERED STAT** [[id number]] Collected:  05/30/20 1715    Order Status:  Completed Specimen:  Blood from Peripheral, Antecubital, Left Updated:  05/31/20 0117     Blood Culture, Routine No Growth to date    Blood Culture #2 **CANNOT BE ORDERED STAT** [[id number]] Collected:  05/30/20 1730    Order Status:  Completed Specimen:  Blood from Peripheral, Antecubital, Left Updated:  05/31/20 0117     Blood Culture, Routine No Growth to date            X-Ray Chest 1 View   Final Result   IMPRESSION:  Tiny interstitial opacities at left lung base as above.    Electronically Signed by STEPHANIE Mar on 5/30/2020 7:45 PM     CT Abdomen Pelvis  Without Contrast   Final Result   IMPRESSION:  1. Findings of acute appendicitis (with a markedly dilated appendix,  periappendiceal fat stranding/edema, trace fluid, and punctate foci of  intra-abdominal free air). No abscess is identified within the limits  of this noncontrast exam.  2. Small nonobstructing right-sided renal stones.  3. Additional, and incidental findings as outlined above.    RESULT NOTIFICATION: These observations were discussed by the  dictating physician by phone with, and acknowledged by HANY FLOYD JR at 5/30/2020 5:01 PM.  .  Electronically Signed by STEPHANIE Mar on 5/30/2020 5:04 PM           Scheduled Meds:   enoxparin  30 mg Subcutaneous Q24H    fluconazole (DIFLUCAN) IVPB  100 mg Intravenous Q24H    mupirocin  1 g Nasal BID    pantoprazole  40 mg Oral Before breakfast    piperacillin-tazobactam (ZOSYN) IVPB  4.5 g Intravenous Q8H     Continuous Infusions:   sodium chloride 0.9% 100 mL/hr at 05/31/20 0135     PRN Meds:.acetaminophen, dextrose 50%, dextrose 50%, dextrose 50%, glucagon (human recombinant), glucagon (human  recombinant), glucose, glucose, HYDROcodone-acetaminophen, HYDROmorphone, HYDROmorphone, insulin aspart U-100, morphine, ondansetron, ondansetron, sodium chloride 0.9%, sodium chloride 0.9%      Assessment & Plan:     Acute Appendicitis with performation  S/P open appendectomy with partial cecectomy due to large perforation at the base of the appendix, fecal contamination of the abdomen and abscess pelvis  POD 1  Cont IV Zosyn 4.5 gms q 8* and IV Diflucan 100mg  q 24*   Dr Gil on board, appreciate it  Cont IV hydration  Pain management  WBC on admission was 12.22--> 7.01  Tolerating clear liquids   Incentive spirometry   Consulted PT, OOB, Ambulation     Early sepsis w/o shock  Fluid Bolus given  Sepsis protocol was initiated in the ER  Blood cultures so far negative  Lactic acid on admission 2.0-->1.5  Continue Zosyn and Diflucan for now     KINJAL -most likely secondary to dehydration and infection  IV hydration for now  Patient tolerating oral/clear liquids  BUN/ Cr on admission 37/2.1--> 35/1.9   Hold all nephrotoxic Med.     Essential HTN - Hypotensive on admission and now normotensive  Hold B/P meds for now  Will re introduce meds as BP allows      NIDDM   Low dose Novolog s/s insulin  S/S protocol     Unchanged 8 mm nodule at the left costophrenic sulcus  (seen on CT today)  Referral out pt  Send copies to PCP    Discharge Planning:   Probably 1-2 days     Above encounter included review of the medical records, interviewing and examining the patient face-to-face, discussion with family and other health care providers, ordering and interpreting lab/test results and formulating a plan of care.     Medical Decision Making:      [_] Low Complexity  [_] Moderate Complexity  [x] High Complexity      Gideon Liao MD  Department of Hospital Medicine   [facility]

## 2020-05-31 NOTE — NURSING
Report received from [facility]. Pt transferred via stretcher by RN. Pt received in stable condition. Oriented to room and POC reviewed.

## 2020-05-31 NOTE — HOSPITAL COURSE
75-year-old white male with known past medical history of diabetes, hypertension, kidney stone admitted 05/30/2020 with a diagnosis of acute appendicitis with perforation.  Patient underwent open appendectomy with partial seek ectomy due to large perforation at the base of the appendix, fecal contamination of the abdomen and abscess in the pelvis with Dr. Gil.  Patient started on IV Zosyn 4.5 q.8 hours and IV Diflucan 100 mg Q 24 hr.  Next morning patient was started on clear liquids and tolerated well, was advanced to full liquids  Encourage incentive spirometry.  Consulted PT, out of bed and ambulation.  Pain controlled well.  For KINJAL, patient is receiving IV hydration, BUN creatinine slowly improving  Patient was hypotensive on admission, blood pressure medications are on hold  Telmisartan 40 mg resumed as blood pressure is rising  Patient ambulated, tolerating full liquids, advance as tolerated. LESLIE drain in place  Patient then developed atrial fibrillation with RVR during hospitalization.  Patient was transferred to cardiac unit and started on metoprolol.  Heart rate improved but patient continues to be in AFib with rate control.  Cardiology consulted.  Case discussed with patient's cardiologist, Dr. Crisostomo, who recommended rate control and will follow-up with Dr. Crisostomo as an outpatient for possible cardioversion.  Patient tolerated diet.  Patient was stable discharge to home with instructions to follow-up with general surgery, Cardiology.    General: Patient resting comfortably in no acute distress. Appears as stated age. Calm  Eyes: EOM intact. No conjunctivae injection. No scleral icterus.  ENT: Hearing grossly intact. No discharge from ears. No nasal discharge.   CVS: IRR. No LE edema BL.  Lungs: CTA BL, no wheezing or crackles. Good breath sounds. No accessory muscle use. No acute respiratory distress  Neuro: AOx3. GCS 15. Cranial nerves grossly intact. Moves all extremities equally. Follows  commands. Responds appropriately     Case discussed with Cardiology, General surgery prior to discharge.

## 2020-05-31 NOTE — ANESTHESIA PREPROCEDURE EVALUATION
05/30/2020  Ruy Ramos Jr. is a 75 y.o., male.  Patient Active Problem List   Diagnosis    Ureteral stone    Nephrolithiasis    Type 2 diabetes mellitus without complication    Calculus of kidney    Appendicitis/ with microproforations     Severe sepsis    KINJAL (acute kidney injury)    Acute appendicitis       Past Surgical History:   Procedure Laterality Date    arm fracture surgery      FRACTURE SURGERY      Left arm        Tobacco Use:  The patient  reports that he has never smoked. He has never used smokeless tobacco.     Results for orders placed or performed during the hospital encounter of 05/30/20   EKG 12-lead    Collection Time: 05/30/20  3:32 PM    Narrative    Test Reason : R61,    Vent. Rate : 088 BPM     Atrial Rate : 088 BPM     P-R Int : 178 ms          QRS Dur : 074 ms      QT Int : 338 ms       P-R-T Axes : 032 042 047 degrees     QTc Int : 408 ms    Normal sinus rhythm  Nonspecific T wave abnormality  Abnormal ECG  When compared with ECG of 06-MAY-2015 12:42,  Vent. rate has increased BY  47 BPM  Nonspecific T wave abnormality now evident in Anterior-lateral leads    Referred By: [email or web address]   SELF           Confirmed By:         Imaging Results          X-Ray Chest 1 View (Edited Result - FINAL)  Result time 05/30/20 19:40:11    Final result by Munir Hanna MD (05/30/20 19:40:11)                 Narrative:    CLINICAL HISTORY:  75 years (1945) Male    TECHNIQUE:  Portable AP radiograph the chest.    COMPARISON:  Radiograph of the chest most recently from August 23, 2018.    FINDINGS:  There are faint linear opacities at the lung bases suggestive of  atelectasis/scarring, and less likely aspiration/pneumonia or trace  edema in the appropriate clinical setting. Costophrenic angles are  seen without effusion. No pneumothorax is identified. The heart is  normal in  size. The mediastinum is within normal limits. Osseous  structures appear within normal limits. The visualized upper abdomen  is unremarkable.    IMPRESSION:  Tiny interstitial opacities at left lung base as above.              .            Electronically Signed by STEPHANIE Mar. on 5/30/2020 7:45 PM                             CT Abdomen Pelvis  Without Contrast (Edited Result - FINAL)  Result time 05/30/20 16:56:06   Procedure changed from CT Abdomen Pelvis With Contrast     Final result by Munir Hanna MD (05/30/20 16:56:06)                 Narrative:    CMS MANDATED QUALITY DATA - CT RADIATION  436    All CT scans at this facility utilize dose modulation, iterative  reconstruction, and/or weight based dosing when appropriate to reduce  radiation dose to as low as reasonably achievable.    CLINICAL HISTORY:  75 years (1945) Male Infection, abdomen-pelvis    TECHNIQUE:  CT ABDOMEN PELVIS WITHOUT CONTRAST. 347 images obtained. Axial CT  images of the abdomen and pelvis were obtained from the dome of the  diaphragm to the proximal thigh.    CONTRAST:  No IV contrast was administered    COMPARISON:  CT most recently from August 23, 2018    FINDINGS:  Evaluation of the solid organs, infection and vasculature is  suboptimal without contrast.    Lower Thorax:  Near bandlike areas of atelectasis versus scarring are seen in the  dependent lower lobes bilaterally. There is mild bronchiectasis in the  posterior segment of both lower lobes. The heart is normal in size  with scattered coronary arterial calcifications.    CT Abdomen:  Liver: The liver is normal in size and imaging appearance.  Gallbladder: The gallbladder is within normal limits.  Biliary Tree: No intra or extrahepatic ductal dilation.  Spleen: Within normal limits.  Pancreas: The pancreas is normal.  Adrenal Glands: The adrenal glands appear within normal limits.  Kidneys: No hydronephrosis, hydroureter or finding of obstructive  uropathy.  There is a 3 mm nonobstructing inferior pole right renal  stone, with 1 mm adjacent stone, and a 1 mm nonobstructing upper pole  right renal stone.  Vasculature: The aorta is normal in course and caliber.  Lymph nodes: No abdominal lymphadenopathy is seen.  Intraperitoneal structures: Trace periappendiceal fluid and  intra-abdominal free air, for example punctate foci which are  perihepatic (axial image 47, 50, 59), and in the right lower quadrant  (image 54-57).  Bowel: The appendix is dilated/distended measuring 15 mm in diameter  with moderate periappendiceal fat stranding/edema. There punctate  extraluminal foci of gas (image 154-156). No discrete abscess is  identified on this noncontrasted exam. There is mild circumferential  small bowel wall thickening in the distal ileal small bowel adjacent  to the appendix. There is a moderate volume of stool and gas in the  colon with a nonobstructive bowel gas pattern.  Abdominal wall: Small bilateral fat-containing inguinal hernias.  Musculoskeletal: There is degenerative disc disease and facet  arthropathy in the lumbar spine with no aggressive appearing lytic or  blastic lesions .    CT Pelvis:  Bladder: The urinary bladder is within normal limits.  Reproductive Organs: The prostate and seminal vesicles are within  normal limits.  Pelvic Lymph nodes: No pelvic lymphadenopathy or pelvic mass is  identified.    IMPRESSION:  1. Findings of acute appendicitis (with a markedly dilated appendix,  periappendiceal fat stranding/edema, trace fluid, and punctate foci of  intra-abdominal free air). No abscess is identified within the limits  of this noncontrast exam.  2. Small nonobstructing right-sided renal stones.  3. Additional, and incidental findings as outlined above.                    RESULT NOTIFICATION: These observations were discussed by the  dictating physician by phone with, and acknowledged by HANY FLOYD JR at 5/30/2020 5:01 PM.  .    Electronically Signed  by STEPHANIE Mar on 5/30/2020 5:04 PM                               Lab Results   Component Value Date    WBC 12.22 05/30/2020    HGB 13.5 (L) 05/30/2020    HCT 41.4 05/30/2020    MCV 90 05/30/2020     05/30/2020     BMP  Lab Results   Component Value Date     (L) 05/30/2020    K 4.4 05/30/2020     05/30/2020    CO2 19 (L) 05/30/2020    BUN 37 (H) 05/30/2020    CREATININE 2.1 (H) 05/30/2020    CALCIUM 9.4 05/30/2020    ANIONGAP 13 05/30/2020    ESTGFRAFRICA 34.1 (A) 05/30/2020    EGFRNONAA 29.5 (A) 05/30/2020             Anesthesia Evaluation    I have reviewed the Patient Summary Reports.    I have reviewed the Nursing Notes. I have reviewed the NPO Status.   I have reviewed the Medications.     Review of Systems  Anesthesia Hx:  No problems with previous Anesthesia  History of prior surgery of interest to airway management or planning: Previous anesthesia: General carotid endarterectomy 3 months ago with general anesthesia.  Denies Family Hx of Anesthesia complications.   Denies Personal Hx of Anesthesia complications.   Social:  Non-Smoker    Hematology/Oncology:     Oncology Normal     EENT/Dental:EENT/Dental Normal   Cardiovascular:   Hypertension, well controlled S/p carotid endarterectomy   Pulmonary:  Pulmonary Normal    Renal/:   Chronic Renal Disease (KINJAL this admit, underlying CRI ), ARF, CRI renal calculi    Hepatic/GI:  Hepatic/GI Normal    Musculoskeletal:   Hx of occ neck and back localized pain, no radicular symptoms reported   Neurological:   Peripheral Neuropathy (diabetic neuropathy per clinic records)    Endocrine:   Diabetes, well controlled, type 2        Physical Exam  General:  Well nourished    Airway/Jaw/Neck:  Airway Findings: Mouth Opening: Normal Tongue: Normal  General Airway Assessment: Adult  Mallampati: II  Improves to II with phonation.  TM Distance: Normal, at least 6 cm  Jaw/Neck Findings:  Neck ROM: Normal ROM      Dental:  Dental Findings: Upper  Dentures   Chest/Lungs:  Chest/Lungs Findings: Clear to auscultation, Normal Respiratory Rate     Heart/Vascular:  Heart Findings: Rate: Normal  Rhythm: Regular Rhythm  Sounds: Normal     Abdomen:  Abdomen Findings: Normal    Musculoskeletal:  Musculoskeletal Findings: Normal   Skin:  Skin Findings: Normal    Mental Status:  Mental Status Findings:  Cooperative, Alert and Oriented         Anesthesia Plan  Type of Anesthesia, risks & benefits discussed:  Anesthesia Type:  general  Patient's Preference:   Intra-op Monitoring Plan: standard ASA monitors  Intra-op Monitoring Plan Comments:   Post Op Pain Control Plan: multimodal analgesia  Post Op Pain Control Plan Comments:   Induction:   IV  Beta Blocker:  Patient is not currently on a Beta-Blocker (No further documentation required).       Informed Consent: Patient understands risks and agrees with Anesthesia plan.  Questions answered. Anesthesia consent signed with patient.  ASA Score: 3  emergent   Day of Surgery Review of History & Physical: I have interviewed and examined the patient. I have reviewed the patient's H&P dated:  There are no significant changes.      Anesthesia Plan Notes: GETA  Zofran/Decadron        Ready For Surgery From Anesthesia Perspective.

## 2020-05-31 NOTE — ANESTHESIA POSTPROCEDURE EVALUATION
Anesthesia Post Evaluation    Patient: Ruy Ramos Jr.    Procedure(s) Performed: Procedure(s) (LRB):  LAPROSCOPIC APPENDECTOMY ATTEMPTED, CONVERTED TO OPEN. (N/A)    Final Anesthesia Type: general    Patient location during evaluation: PACU  Patient participation: Yes- Able to Participate  Level of consciousness: awake and alert and oriented  Post-procedure vital signs: reviewed and stable  Pain management: adequate  Airway patency: patent    PONV status at discharge: No PONV  Anesthetic complications: no      Cardiovascular status: blood pressure returned to baseline and hemodynamically stable  Respiratory status: unassisted, spontaneous ventilation and nasal cannula  Hydration status: euvolemic  Follow-up not needed.          Vitals Value Taken Time   /68 5/31/2020 12:15 AM   Temp 36.4 °C (97.6 °F) 5/31/2020 12:15 AM   Pulse 85 5/31/2020 12:19 AM   Resp 18 5/31/2020 12:19 AM   SpO2 97 % 5/31/2020 12:19 AM   Vitals shown include unvalidated device data.      No case tracking events are documented in the log.      Pain/Ernestine Score: Pain Rating Prior to Med Admin: 5 (5/30/2020  8:01 PM)  Ernestine Score: 8 (5/31/2020 12:15 AM)

## 2020-05-31 NOTE — PROGRESS NOTES
[facility]  General Surgery  Progress Note    Subjective:     Interval History:  Patient states he is feeling well this morning.  His preoperative abdominal pain has been replaced with incisional pain.  He reports no nausea or vomiting.  He is tolerating clear liquids.  He has been afebrile since surgery.    Post-Op Info:  Procedure(s) (LRB):  LAPROSCOPIC APPENDECTOMY ATTEMPTED, CONVERTED TO OPEN. (N/A)   1 Day Post-Op      Medications:  Continuous Infusions:   sodium chloride 0.9% 100 mL/hr at 05/31/20 0135     Scheduled Meds:   enoxparin  30 mg Subcutaneous Q24H    fluconazole (DIFLUCAN) IVPB  100 mg Intravenous Q24H    mupirocin  1 g Nasal BID    pantoprazole  40 mg Oral Before breakfast    piperacillin-tazobactam (ZOSYN) IVPB  4.5 g Intravenous Q8H     PRN Meds:acetaminophen, dextrose 50%, dextrose 50%, dextrose 50%, glucagon (human recombinant), glucagon (human recombinant), glucose, glucose, HYDROcodone-acetaminophen, HYDROmorphone, HYDROmorphone, insulin aspart U-100, morphine, ondansetron, ondansetron, sodium chloride 0.9%, sodium chloride 0.9%     Objective:     Vital Signs (Most Recent):  Temp: 98.6 °F (37 °C) (05/31/20 0730)  Pulse: 77 (05/31/20 0730)  Resp: 18 (05/31/20 0730)  BP: 129/68 (05/31/20 0730)  SpO2: (!) 94 % (05/31/20 0730) Vital Signs (24h Range):  Temp:  [97.6 °F (36.4 °C)-102.8 °F (39.3 °C)] 98.6 °F (37 °C)  Pulse:  [] 77  Resp:  [16-32] 18  SpO2:  [92 %-100 %] 94 %  BP: (103-146)/(52-81) 129/68       Intake/Output Summary (Last 24 hours) at 5/31/2020 1031  Last data filed at 5/31/2020 0800  Gross per 24 hour   Intake 2250 ml   Output 1640 ml   Net 610 ml       Physical Exam   Constitutional: He is oriented to person, place, and time. No distress.   Pulmonary/Chest: Effort normal. No respiratory distress.   Abdominal: Soft. He exhibits no distension. There is tenderness. There is no rebound and no guarding.   Abdomen is appropriately tender  Incision is clean dry  and intact  LESLIE drains appropriate   Neurological: He is alert and oriented to person, place, and time.       Significant Labs:  CBC:   Recent Labs   Lab 05/31/20  0328   WBC 7.01   RBC 4.06*   HGB 11.9*   HCT 36.9*      MCV 91   MCH 29.3   MCHC 32.2     CMP:   Recent Labs   Lab 05/31/20  0328   *   CALCIUM 8.4*   ALBUMIN 3.1*   PROT 6.6   *   K 4.7   CO2 16*      BUN 35*   CREATININE 1.9*   ALKPHOS 63   ALT 17   AST 18   BILITOT 0.9       Significant Diagnostics:  I have reviewed all pertinent imaging results/findings within the past 24 hours.    Assessment/Plan:     Active Diagnoses:    Diagnosis Date Noted POA    PRINCIPAL PROBLEM:  Acute appendicitis with perforation, localized peritonitis, and abscess, without gangrene [K35.33] 05/30/2020 Yes    Severe sepsis [A41.9, R65.20] 05/30/2020 Yes    KINJAL (acute kidney injury) [N17.9] 05/30/2020 Yes    Acute appendicitis [K35.80] 05/30/2020 Yes    Type 2 diabetes mellitus without complication [E11.9] 09/11/2015 Yes      Problems Resolved During this Admission:     -will continue the IV Zosyn and Diflucan.  He had significant perforation and contamination.  -will advance diet to full liquids.  -PT  -IS    Eusebio Gil III, MD  General Surgery  [facility]

## 2020-05-31 NOTE — ED NOTES
Received report from ALFONSO Hernandez  Pt resting in bed comfortably, Pain 2/10 reported.   Will continue to monitor.

## 2020-05-31 NOTE — PROGRESS NOTES
Pharmacist Renal Dose Adjustment Note    Ruy Ramos Jr. is a 75 y.o. male being treated with the medication Lovenox    Patient Data:    Vital Signs (Most Recent):  Temp: 98.8 °F (37.1 °C) (05/31/20 0035)  Pulse: 80 (05/31/20 0035)  Resp: 18 (05/31/20 0035)  BP: 126/65 (05/31/20 0035)  SpO2: 95 % (05/31/20 0035) Vital Signs (72h Range):  Temp:  [97.6 °F (36.4 °C)-102.8 °F (39.3 °C)]   Pulse:  []   Resp:  [16-32]   BP: (103-146)/(52-81)   SpO2:  [92 %-100 %]      Recent Labs   Lab 05/30/20  1543   CREATININE 2.1*     Serum creatinine: 2.1 mg/dL (H) 05/30/20 1543  Estimated creatinine clearance: 29.5 mL/min (A)    Medication:Lovenox dose: 40 mg SQ frequency Q24H will be changed to medication:Lovenox dose:30 mg SQ frequency:Q24H    Pharmacist's Name: Rashaad Soto  Pharmacist's Extension: [phone]

## 2020-05-31 NOTE — H&P
[facility] Medicine History & Physical Examination   Patient Name: Ruy Ramos Jr.  MRN: [id number]  Patient Class: Emergency   Admission Date: 5/30/2020  3:03 PM  Length of Stay: 0  Attending Physician:   Primary Care Provider: Kanu Medeiros MD  Face-to-Face encounter date: 05/30/2020  Code Status:Full Code  MPOA:   Chief Complaint: Groin Pain (RT ,ONSET THURSDAY)        Patient information was obtained from patient, past medical records and ER records.   HISTORY OF PRESENT ILLNESS:   Ruy Ramos Jr. is a 75 y.o. old male who  has a past medical history of Diabetes mellitus, Hypertension, and Kidney stone.. The patient presented to [facility] on 5/30/2020 with a primary complaint of Groin Pain (RT ,ONSET THURSDAY)  .     75-year-old  male presents emergency room with fever and abdominal pain.      The patient states for the last 2-3 days he has been having fever, chills and right lower quadrant abdominal pain.  He was also having right chest rib pain.  The patient describes his pain as a sharp pressure sensation that worsens with palpation and movement.      The patient states he had been taking Tylenol and ibuprofen without improvement in his symptoms.      The patient states today he had a temperature of a 102° and his right lower quadrant abdominal pain got progressively and intensely worse.  This concerned him so he came to the emergency room for further evaluation.      Upon arrival in emergency room the patient was found to be hypotensive febrile and tachycardic.  He was given IV fluid boluses and diagnostic imaging was ordered.      A CT of the abdomen and pelvis did reveal an acute appendicitis with microperforations.  Surgery was consulted by the ER physician and the plan is for emergent appendectomy.      The patient also had acute kidney injury most likely sepsis secondary to infection and nephrotoxin medications complicated by dehydration.   IV antibiotics were started in the emergency room      COVID-19 test was negative  REVIEW OF SYSTEMS:   10 Point Review of System was performed and was found to be negative except for that mentioned already in the HPI and   Review of Systems (Negative unless checked off)  Review of Systems   Constitutional: Positive for chills, diaphoresis, fever and malaise/fatigue.   HENT: Negative.    Eyes: Negative.    Respiratory: Positive for cough.    Cardiovascular: Negative.    Gastrointestinal: Positive for abdominal pain.   Genitourinary: Negative.    Musculoskeletal:        Rib pain   Skin: Negative.    Neurological: Negative.    Psychiatric/Behavioral: Negative.            PAST MEDICAL HISTORY:     Past Medical History:   Diagnosis Date    Diabetes mellitus     RECENT DX 3 weeks ago 3/2015    Hypertension     Kidney stone        PAST SURGICAL HISTORY:     Past Surgical History:   Procedure Laterality Date    arm fracture surgery      FRACTURE SURGERY      Left arm       ALLERGIES:   Patient has no known allergies.    FAMILY HISTORY:   No family history on file.    SOCIAL HISTORY:     Social History     Tobacco Use    Smoking status: Never Smoker    Smokeless tobacco: Never Used   Substance Use Topics    Alcohol use: No        Social History     Substance and Sexual Activity   Sexual Activity Not on file        HOME MEDICATIONS:     Prior to Admission medications    Medication Sig Start Date End Date Taking? Authorizing Provider   amlodipine (NORVASC) 5 MG tablet Take 5 mg by mouth once daily. 4/7/15  Yes Historical Provider, MD   aspirin (ECOTRIN) 81 MG EC tablet Take 81 mg by mouth once daily.   Yes Historical Provider, MD   canagliflozin (INVOKANA) 300 mg Tab tablet Take 300 mg by mouth once daily.   Yes Historical Provider, MD   CYANOCOBALAMIN/FA/PYRIDOXINE (FOLPLEX 2.2 ORAL) Take 1 tablet by mouth once daily.    Yes Historical Provider, MD   ezetimibe (ZETIA) 10 mg tablet Take 10 mg by mouth once daily.     "Yes Historical Provider, MD   gemfibrozil (LOPID) 600 MG tablet Take 600 mg by mouth 2 (two) times daily before meals.   Yes Historical Provider, MD   L. acidophilus/L. rhamnosus (DIGESTIVE HEALTH PROBIOTIC ORAL) Take 1 tablet by mouth once daily.   Yes Historical Provider, MD   MULTIVITAMIN ORAL Take 1 tablet by mouth once daily.    Yes Historical Provider, MD   omega 3-dha-epa-fish oil (OMEGA 3 FISH OIL) 900-1,400 mg CpDR Take 2 capsules by mouth 2 (two) times daily.    Yes Historical Provider, MD   pioglitazone (ACTOS) 15 MG tablet Take 15 mg by mouth once daily.   Yes Historical Provider, MD   telmisartan (MICARDIS) 40 MG Tab Take 40 mg by mouth once daily.   Yes Historical Provider, MD   vitamin D (VITAMIN D3) 1000 units Tab Take 1,000 Units by mouth once daily.   Yes Historical Provider, MD   ACCU-CHEK AUDREY PLUS METER Misc  3/11/15   Historical Provider, MD   ACCU-CHEK AUDREY PLUS TEST STRP Strp  3/11/15   Historical Provider, MD   [facility]K [facility]IX [facility]ANCETS [facility]  3/11/15   Historical Provider, MD   clopidogreL (PLAVIX) 75 mg tablet Take 75 mg by mouth once daily.  5/30/20  Historical Provider, MD   metformin (GLUCOPHAGE-XR) 500 MG 24 hr tablet Take 1,000 mg by mouth once daily.  5/6/15 5/30/20  Historical Provider, MD   sodium bicarbonate 325 MG tablet Take 2 tablets (650 mg total) by mouth 3 (three) times daily. 9/21/15 5/30/20  Ssuan Long MD         PHYSICAL EXAM:   BP (!) 115/59   Pulse 96   Temp (!) 100.9 °F (38.3 °C) (Oral)   Resp (!) 24   Ht 5' 9" (1.753 m)   Wt 79.4 kg (175 lb)   SpO2 (!) 93%   BMI 25.84 kg/m²   Vitals Reviewed  General appearance: Well-developed, well-nourished male in no apparent distress.  Skin: No Rash.   Neuro: Motor and sensory exams grossly intact. Good tone. Power in all 4 extremities 5/5.   HENT: Atraumatic head. Moist mucous membranes of oral cavity.  Eyes: Normal extraocular movements.   Neck: Supple. No evidence of lymphadenopathy. No " thyroidomegaly.  Lungs: Clear to auscultation bilaterally. No wheezing present.   Heart: Regular rate and rhythm. S1 and S2 present with no murmurs/gallop/rub. No pedal edema. No JVD present.   Abdomen: Soft, non-distended,+ tender. +rebound tenderness/+guarding. No masses or organomegaly. Bowel sounds are normal. Bladder is not palpable.   Extremities: No cyanosis, clubbing, or edema.  Psych/mental status: Alert and oriented. Cooperative. Responds appropriately to questions.   EMERGENCY DEPARTMENT LABS AND IMAGING:   Following labs were Reviewed   Recent Labs   Lab 05/30/20  1543   WBC 12.22   HGB 13.5*   HCT 41.4      CALCIUM 9.4   ALBUMIN 4.0   PROT 8.1   *   K 4.4   CO2 19*      BUN 37*   CREATININE 2.1*   ALKPHOS 95   ALT 16   AST 17   BILITOT 0.7         BMP:   Recent Labs   Lab 05/30/20  1543   *   *   K 4.4      CO2 19*   BUN 37*   CREATININE 2.1*   CALCIUM 9.4   , CMP   Recent Labs   Lab 05/30/20  1543   *   K 4.4      CO2 19*   *   BUN 37*   CREATININE 2.1*   CALCIUM 9.4   PROT 8.1   ALBUMIN 4.0   BILITOT 0.7   ALKPHOS 95   AST 17   ALT 16   ANIONGAP 13   ESTGFRAFRICA 34.1*   EGFRNONAA 29.5*   , CBC   Recent Labs   Lab 05/30/20  1543   WBC 12.22   HGB 13.5*   HCT 41.4      , INR No results found for: INR, PROTIME, Lipid Panel No results found for: CHOL, HDL, LDLCALC, TRIG, CHOLHDL, Troponin No results for input(s): TROPONINI in the last 168 hours., A1C: No results for input(s): HGBA1C in the last 4320 hours. and All labs within the past 24 hours have been reviewed  Microbiology Results (last 7 days)     Procedure Component Value Units Date/Time    Blood Culture #1 **CANNOT BE ORDERED STAT** [[id number]] Collected:  05/30/20 1715    Order Status:  Sent Specimen:  Blood from Peripheral, Antecubital, Left Updated:  05/30/20 1802    Blood Culture #2 **CANNOT BE ORDERED STAT** [[id number]] Collected:  05/30/20 1730    Order Status:  Sent Specimen:   Blood from Peripheral, Antecubital, Left Updated:  05/30/20 1802        CT Abdomen Pelvis  Without Contrast   Final Result      X-ray Chest 1 View    Result Date: 5/30/2020  ADDENDUM #1RESULT NOTIFICATION: These observations were discussed by the dictating physician by phone with, and acknowledged by HANY FLOYD JR at 5/30/2020 7:59 PM, and the interstitial opacities at the lung bases are favored to represent atelectasis/scarring given the appearance on the recent CT of the chest. Electronically Signed by STEPHANIE Mar on 5/30/2020 7:59 PM ORIGINAL REPORT  CLINICAL HISTORY: 75 years (1945) Male TECHNIQUE: Portable AP radiograph the chest. COMPARISON: Radiograph of the chest most recently from August 23, 2018. FINDINGS: There are faint linear opacities at the lung bases suggestive of atelectasis/scarring, and less likely aspiration/pneumonia or trace edema in the appropriate clinical setting. Costophrenic angles are seen without effusion. No pneumothorax is identified. The heart is normal in size. The mediastinum is within normal limits. Osseous structures appear within normal limits. The visualized upper abdomen is unremarkable. IMPRESSION: Tiny interstitial opacities at left lung base as above. . Electronically Signed by STEPHANIE Mar on 5/30/2020 7:45 PM    Ct Abdomen Pelvis  Without Contrast    Result Date: 5/30/2020   ADDENDUM #1Addendum: Unchanged 8 mm nodule at the left costophrenic sulcus (image 14), seen on studies dating back to 2016. Electronically Signed by STEPHANIE Mar on 5/30/2020 7:47 PMORIGINAL REPORT  CMS MANDATED QUALITY DATA - CT RADIATION  436 All CT scans at this facility utilize dose modulation, iterative reconstruction, and/or weight based dosing when appropriate to reduce radiation dose to as low as reasonably achievable. CLINICAL HISTORY: 75 years (1945) Male Infection, abdomen-pelvis TECHNIQUE: CT ABDOMEN PELVIS WITHOUT CONTRAST. 347 images obtained.  Axial CT images of the abdomen and pelvis were obtained from the dome of the diaphragm to the proximal thigh. CONTRAST: No IV contrast was administered COMPARISON: CT most recently from August 23, 2018 FINDINGS: Evaluation of the solid organs, infection and vasculature is suboptimal without contrast. Lower Thorax: Near bandlike areas of atelectasis versus scarring are seen in the dependent lower lobes bilaterally. There is mild bronchiectasis in the posterior segment of both lower lobes. The heart is normal in size with scattered coronary arterial calcifications. CT Abdomen: Liver: The liver is normal in size and imaging appearance. Gallbladder: The gallbladder is within normal limits. Biliary Tree: No intra or extrahepatic ductal dilation. Spleen: Within normal limits. Pancreas: The pancreas is normal. Adrenal Glands: The adrenal glands appear within normal limits. Kidneys: No hydronephrosis, hydroureter or finding of obstructive uropathy. There is a 3 mm nonobstructing inferior pole right renal stone, with 1 mm adjacent stone, and a 1 mm nonobstructing upper pole right renal stone. Vasculature: The aorta is normal in course and caliber. Lymph nodes: No abdominal lymphadenopathy is seen. Intraperitoneal structures: Trace periappendiceal fluid and intra-abdominal free air, for example punctate foci which are perihepatic (axial image 47, 50, 59), and in the right lower quadrant (image 54-57). Bowel: The appendix is dilated/distended measuring 15 mm in diameter with moderate periappendiceal fat stranding/edema. There punctate extraluminal foci of gas (image 154-156). No discrete abscess is identified on this noncontrasted exam. There is mild circumferential small bowel wall thickening in the distal ileal small bowel adjacent to the appendix. There is a moderate volume of stool and gas in the colon with a nonobstructive bowel gas pattern. Abdominal wall: Small bilateral fat-containing inguinal hernias. Musculoskeletal:  There is degenerative disc disease and facet arthropathy in the lumbar spine with no aggressive appearing lytic or blastic lesions . CT Pelvis: Bladder: The urinary bladder is within normal limits. Reproductive Organs: The prostate and seminal vesicles are within normal limits. Pelvic Lymph nodes: No pelvic lymphadenopathy or pelvic mass is identified. IMPRESSION: 1. Findings of acute appendicitis (with a markedly dilated appendix, periappendiceal fat stranding/edema, trace fluid, and punctate foci of intra-abdominal free air). No abscess is identified within the limits of this noncontrast exam. 2. Small nonobstructing right-sided renal stones. 3. Additional, and incidental findings as outlined above. RESULT NOTIFICATION: These observations were discussed by the dictating physician by phone with, and acknowledged by HANY FLOYD JR at 5/30/2020 5:01 PM. . Electronically Signed by STEPHANIE Mar on 5/30/2020 5:04 PM      12 lead EKG Normal sinus rhythm, normal axis, good R-wave progression, ST flattening throughout rate 88  milliseconds  ASSESSMENT & PLAN:   Ruy Ramos Jr. is a 75 y.o. male admitted for    1. Appendicitis  - IV antibiotic with Zosyn  - Surgery Consult  -IV hydration  -Pain management      2. Early sepsis w/o shock  - Fluid Bolus given  -sepsis protocol  - Trend lactic acid levels    3. KINJAL -most likely secondary to dehydration and infection  - IV hydration   -hold all nephrotoxic Med.      4. Essential HTN - currently Hypotensive  - hold B/P meds for now    5. NIDDM   - low dose Novolog s/s insulin  -S/S protocol    6. Unchanged 8 mm nodule at the left costophrenic sulcus  (seen on CT today)  - referral  - Send copies to PCP    DVT Prophylaxis: will be placed on SCDs for DVT prophylaxis and will be advised to be as mobile as possible and sit in a chair as tolerated.   ________________________________________________________________________________    Discharge Planning and  Disposition: No mobility needs. Ambulating well. Good social support system. Patient will be discharged in   Face-to-Face encounter date: 05/30/2020  Encounter included review of the medical records, interviewing and examining the patient face-to-face, discussion with family and other health care providers including emergency medicine physician, admission orders, interpreting lab/test results and formulating a plan of care.   Medical Decision Making during this encounter was  [_] Low Complexity  [_] Moderate Complexity  [x] High Complexity  _________________________________________________________________________________    INPATIENT LIST OF MEDICATIONS     Current Facility-Administered Medications:     lactated ringers bolus 1,000 mL, 1,000 mL, Intravenous, Once, Alvin Espinoza Jr., MD    Current Outpatient Medications:     amlodipine (NORVASC) 5 MG tablet, Take 5 mg by mouth once daily., Disp: , Rfl: 4    aspirin (ECOTRIN) 81 MG EC tablet, Take 81 mg by mouth once daily., Disp: , Rfl:     canagliflozin (INVOKANA) 300 mg Tab tablet, Take 300 mg by mouth once daily., Disp: , Rfl:     CYANOCOBALAMIN/FA/PYRIDOXINE (FOLPLEX 2.2 ORAL), Take 1 tablet by mouth once daily. , Disp: , Rfl:     ezetimibe (ZETIA) 10 mg tablet, Take 10 mg by mouth once daily. , Disp: , Rfl:     gemfibrozil (LOPID) 600 MG tablet, Take 600 mg by mouth 2 (two) times daily before meals., Disp: , Rfl:     L. acidophilus/L. rhamnosus (DIGESTIVE HEALTH PROBIOTIC ORAL), Take 1 tablet by mouth once daily., Disp: , Rfl:     MULTIVITAMIN ORAL, Take 1 tablet by mouth once daily. , Disp: , Rfl:     omega 3-dha-epa-fish oil (OMEGA 3 FISH OIL) 900-1,400 mg CpDR, Take 2 capsules by mouth 2 (two) times daily. , Disp: , Rfl:     pioglitazone (ACTOS) 15 MG tablet, Take 15 mg by mouth once daily., Disp: , Rfl:     telmisartan (MICARDIS) 40 MG Tab, Take 40 mg by mouth once daily., Disp: , Rfl:     vitamin D (VITAMIN D3) 1000 units Tab, Take 1,000 Units  by mouth once daily., Disp: , Rfl:     ACCU-CHEK AUDREY PLUS METER Misc, , Disp: , Rfl:     ACCU-CHEK AUDREY PLUS TEST STRP Strp, , Disp: , Rfl:     ACCU-CHEK SOFTCLIX LANCETS Misc, , Disp: , Rfl:       Scheduled Meds:   lactated ringers  1,000 mL Intravenous Once     Continuous Infusions:  PRN Meds:.      Denise Serrano  [facility] Hospitalist NP  05/30/2020

## 2020-05-31 NOTE — CONSULTS
[facility]  General Surgery  Consult Note    Consults  Subjective:     Chief Complaint/Reason for Admission: acute perforated appendicitis     History of Present Illness:  Patient is 75-year-old male admitted from the emergency department with acute perforated appendicitis.  He has associated acute on chronic renal injury.  He presented with 3 day history of increasing right lower quadrant abdominal pain associated with fevers and chills.  CT scan showed dilated appendix measuring 15 mm with surrounding inflammation and several foci of free intraperitoneal air consistent with a perforated appendicitis.  Patient reports no nausea or vomiting.  He has no history of similar symptoms.  His last colonoscopy was about 3 years ago.  He has no family history of colorectal cancer.  He has no past abdominal surgical history.  He has other past medical history of hypertension, diabetes, carotid disease status post carotid endarterectomy    No current facility-administered medications on file prior to encounter.      Current Outpatient Medications on File Prior to Encounter   Medication Sig    amlodipine (NORVASC) 5 MG tablet Take 5 mg by mouth once daily.    aspirin (ECOTRIN) 81 MG EC tablet Take 81 mg by mouth once daily.    canagliflozin (INVOKANA) 300 mg Tab tablet Take 300 mg by mouth once daily.    CYANOCOBALAMIN/FA/PYRIDOXINE (FOLPLEX 2.2 ORAL) Take 1 tablet by mouth once daily.     ezetimibe (ZETIA) 10 mg tablet Take 10 mg by mouth once daily.     gemfibrozil (LOPID) 600 MG tablet Take 600 mg by mouth 2 (two) times daily before meals.    L. acidophilus/L. rhamnosus (DIGESTIVE HEALTH PROBIOTIC ORAL) Take 1 tablet by mouth once daily.    MULTIVITAMIN ORAL Take 1 tablet by mouth once daily.     omega 3-dha-epa-fish oil (OMEGA 3 FISH OIL) 900-1,400 mg CpDR Take 2 capsules by mouth 2 (two) times daily.     pioglitazone (ACTOS) 15 MG tablet Take 15 mg by mouth once daily.    telmisartan (MICARDIS) 40  MG Tab Take 40 mg by mouth once daily.    vitamin D (VITAMIN D3) 1000 units Tab Take 1,000 Units by mouth once daily.    ACCU-CHEK AUDREY PLUS METER Misc     ACCU-CHEK AUDREY PLUS TEST STRP Strp     ACCU-CHEK SOFTCLIX LANCETS Misc     [DISCONTINUED] clopidogreL (PLAVIX) 75 mg tablet Take 75 mg by mouth once daily.    [DISCONTINUED] metformin (GLUCOPHAGE-XR) 500 MG 24 hr tablet Take 1,000 mg by mouth once daily.     [DISCONTINUED] sodium bicarbonate 325 MG tablet Take 2 tablets (650 mg total) by mouth 3 (three) times daily.       Review of patient's allergies indicates:  No Known Allergies    Past Medical History:   Diagnosis Date    Diabetes mellitus     RECENT DX 3 weeks ago 3/2015    Hypertension     Kidney stone      Past Surgical History:   Procedure Laterality Date    arm fracture surgery      FRACTURE SURGERY      Left arm     Family History     None        Tobacco Use    Smoking status: Never Smoker    Smokeless tobacco: Never Used   Substance and Sexual Activity    Alcohol use: No    Drug use: No    Sexual activity: Not on file     Review of Systems   Constitutional: Positive for chills and fever. Negative for appetite change and unexpected weight change.   HENT: Negative for hearing loss, rhinorrhea, sore throat and voice change.    Eyes: Negative for photophobia and visual disturbance.   Respiratory: Negative for cough, choking and shortness of breath.    Cardiovascular: Negative for chest pain, palpitations and leg swelling.   Gastrointestinal: Positive for abdominal pain. Negative for blood in stool, constipation, diarrhea and vomiting.   Endocrine: Negative for cold intolerance, heat intolerance, polydipsia and polyuria.   Musculoskeletal: Negative for arthralgias, back pain, joint swelling and neck stiffness.   Skin: Negative for color change, pallor and rash.   Neurological: Negative for dizziness, seizures, syncope and headaches.   Hematological: Negative for adenopathy. Does not  bruise/bleed easily.   Psychiatric/Behavioral: Negative for agitation, behavioral problems and confusion.     Objective:     Vital Signs (Most Recent):  Temp: 99.1 °F (37.3 °C) (05/30/20 2027)  Pulse: 92 (05/30/20 2027)  Resp: 18 (05/30/20 2027)  BP: (!) 119/59 (05/30/20 2027)  SpO2: 95 % (05/30/20 2027) Vital Signs (24h Range):  Temp:  [99.1 °F (37.3 °C)-102.8 °F (39.3 °C)] 99.1 °F (37.3 °C)  Pulse:  [] 92  Resp:  [16-32] 18  SpO2:  [92 %-100 %] 95 %  BP: (103-146)/(52-81) 119/59     Weight: 79.4 kg (175 lb)  Body mass index is 25.84 kg/m².    No intake or output data in the 24 hours ending 05/30/20 2108    Physical Exam   Constitutional: He is oriented to person, place, and time. He appears well-developed and well-nourished. No distress.   HENT:   Head: Normocephalic and atraumatic.   Neck: Neck supple.   Cardiovascular: Normal rate and regular rhythm.   Pulmonary/Chest: Effort normal. No respiratory distress.   Abdominal: Soft. He exhibits no distension. There is tenderness in the right lower quadrant, periumbilical area and suprapubic area. There is rebound, guarding and tenderness at McBurney's point. There is no rigidity.   Tender to palpation across the entire right lower quadrant.  He has guarding and percussive tenderness.   Neurological: He is alert and oriented to person, place, and time.       Significant Labs:  CBC:   Recent Labs   Lab 05/30/20  1543   WBC 12.22   RBC 4.58*   HGB 13.5*   HCT 41.4      MCV 90   MCH 29.5   MCHC 32.6     CMP:   Recent Labs   Lab 05/30/20  1543   *   CALCIUM 9.4   ALBUMIN 4.0   PROT 8.1   *   K 4.4   CO2 19*      BUN 37*   CREATININE 2.1*   ALKPHOS 95   ALT 16   AST 17   BILITOT 0.7       Significant Diagnostics:  I have reviewed all pertinent imaging results/findings within the past 24 hours.    Assessment/Plan:     Active Diagnoses:    Diagnosis Date Noted POA    PRINCIPAL PROBLEM:  Acute appendicitis with perforation and localized  peritonitis, without abscess or gangrene [K35.32] 05/30/2020 Yes    Severe sepsis [A41.9, R65.20] 05/30/2020 Yes    KINJAL (acute kidney injury) [N17.9] 05/30/2020 Yes    Acute appendicitis [K35.80] 05/30/2020 Yes    Type 2 diabetes mellitus without complication [E11.9] 09/11/2015 Yes      Problems Resolved During this Admission:     -patient has been admitted and started on Zosyn.  He will go to the operating room emergently tonight for appendectomy.  All risks and benefits of the surgery were discussed with the patient.  Risks include but are not limited to pain, bleeding, infection, abscess, conversion to open, colon resection, anastomotic leak.   Thank you for your consult.     Eusebio Gil III, MD  General Surgery  [facility]

## 2020-05-31 NOTE — OP NOTE
Surgery Date: 5/30/2020     Surgeon(s) and Role:     * Eusebio Gil III, MD - Primary    Assisting Surgeon: None    Pre-op Diagnosis:  Acute appendicitis [K35.80]    Post-op Diagnosis:  Post-Op Diagnosis Codes:     * Acute appendicitis with perforation at the base and abscess     Procedure(s) (LRB):  LAPROSCOPIC APPENDECTOMY ATTEMPTED, CONVERTED TO OPEN. (N/A)   1. Attempted laparoscopic appendectomy  2.  Open appendectomy including partial Cecectomy.      Anesthesia: General    Description of Procedure: The patient was taken to the operating room and transferred to the operating room table in the supine position.  The patient was given general anesthesia and intubated.  Louie catheter was placed.  Abdomen was sterilely prepped and draped.  A transverse infraumbilical incision was made.  Dissection was carried down through the skin and subcutaneous tissue.  The abdomen was entered using the open Adams technique.  Abdomen was insufflated.  The patient was put in Trendelenburg position.  Under direct visualization, a 5 mm port was placed in the suprapubic position in the midline.  Another 5 mm port was placed in the left lower quadrant.  We immediately encountered turbid ascites. There was pus in the pelvis. The patient was then tilted to the left. There was a severe inflammatory reaction involving the entire RLQ.  The small bowel was densely adhered to the appendix and cecum. A plan was found and the small bowel was swept away from the right lower quadrant.  The cecum was identified.  The appendix was identified. It was perforated at the base. The inflammation involved the proximal cecum. There was thick fibrinous rind around the appendix and cecum. Appendix was grasped and retracted anteriorly. It was adhered to the abdominal wall. It was dissected from the abdomenial wall. The Ligasure was used to divide mesoappendix. I tried to get below the level of the perforation and come across the proximal cecum with  the linear stapler. The cecum was too indurated to try to come across. There was not a soft landing spot. Because of this, I removed the laparoscopic instruments and converted to an open procedure.  A limited midline laparotomy incision was made.  Dissection was carried down through the skin and subcutaneous tissue.  The abdomen was opened through the linea alba.  Self retaining bowel for retractor was placed.  The cecum and proximal ascending colon was mobilized along the white line of Toldt.  This allowed me to get a 75 JUSTINA across the proximal cecum.  This allowed me to get well below the perforated and inflamed appendix as well as removed the indurated cecum.  I did not have to disrupt the continuity of the bowel.  I was able to get a wedge of cecum around the appendix.  The specimen was sent to the back table.  The abdomen was irrigated with copious amounts of irrigation.   A 10 flat LESLIE was left in the pelvis.  It was brought out through the left lateral port site.  There was no evidence of any bleeding or leak at the end of the case.  Laparotomy pad counts were correct.  The abdominal fascia was closed using running 0 PDS.  The skin was closed with skin staples. The patient's Louie catheter remained in place.  was removed.  Patient was extubated and brought to the recovery room in stable condition.    Description of the findings of the procedure: large perforation at the base of the appendix. There was fecal contamination in the abdomen. There was abscess in the pelvis. I could not get a laparoscopic stapler below the level of the perforation. The cecum was very inflamed secondary to the appendix. I converted to open and had to remove the proximal cecum with the appendix using a 75mm JUSTINA. 10 flat LESLIE placed into the pelvis.     Estimated Blood Loss: 100 mL    Instrument and lap counts correct     Complications none          Specimens:   Specimen (12h ago, onward)     Start     Ordered    05/30/20 2198  Specimen  to Pathology - Surgery  Once     Comments:  Pre-op Diagnosis: Acute appendicitis [K35.80]Procedure(s):APPENDECTOMY, LAPAROSCOPIC Number of specimens: 1Name of specimens: Appendix with partial cecum      05/30/20 6967

## 2020-05-31 NOTE — BRIEF OP NOTE
[facility]  Brief Operative Note    SUMMARY     Surgery Date: 5/30/2020     Surgeon(s) and Role:     * Eusebio Gil III, MD - Primary    Assisting Surgeon: None    Pre-op Diagnosis:  Acute appendicitis [K35.80]    Post-op Diagnosis:  Post-Op Diagnosis Codes:     * Acute appendicitis with perforation at the base and abscess     Procedure(s) (LRB):  LAPROSCOPIC APPENDECTOMY ATTEMPTED, CONVERTED TO OPEN. (N/A)   1. Attempted laparoscopic appendectomy  2.  Open appendectomy including partial Cecectomy.      Anesthesia: General    Description of Procedure: The patient was taken to the operating room and transferred to the operating room table in the supine position.  The patient was given general anesthesia and intubated.  Louie catheter was placed.  Abdomen was sterilely prepped and draped.  A transverse infraumbilical incision was made.  Dissection was carried down through the skin and subcutaneous tissue.  The abdomen was entered using the open Adams technique.  Abdomen was insufflated.  The patient was put in Trendelenburg position.  Under direct visualization, a 5 mm port was placed in the suprapubic position in the midline.  Another 5 mm port was placed in the left lower quadrant.  We immediately encountered turbid ascites. There was pus in the pelvis. The patient was then tilted to the left. There was a severe inflammatory reaction involving the entire RLQ.  The small bowel was densely adhered to the appendix and cecum. A plan was found and the small bowel was swept away from the right lower quadrant.  The cecum was identified.  The appendix was identified. It was perforated at the base. The inflammation involved the proximal cecum. There was thick fibrinous rind around the appendix and cecum. Appendix was grasped and retracted anteriorly. It was adhered to the abdominal wall. It was dissected from the abdomenial wall. The Ligasure was used to divide mesoappendix. I tried to get below the  level of the perforation and come across the proximal cecum with the linear stapler. The cecum was too indurated to try to come across. There was not a soft landing spot. Because of this, I removed the laparoscopic instruments and converted to an open procedure.  A limited midline laparotomy incision was made.  Dissection was carried down through the skin and subcutaneous tissue.  The abdomen was opened through the linea alba.  Self retaining bowel for retractor was placed.  The cecum and proximal ascending colon was mobilized along the white line of Toldt.  This allowed me to get a 75 JUSTINA across the proximal cecum.  This allowed me to get well below the perforated and inflamed appendix as well as removed the indurated cecum.  I did not have to disrupt the continuity of the bowel.  I was able to get a wedge of cecum around the appendix.  The specimen was sent to the back table.  The abdomen was irrigated with copious amounts of irrigation.   A 10 flat LESLIE was left in the pelvis.  It was brought out through the left lateral port site.  There was no evidence of any bleeding or leak at the end of the case.  Laparotomy pad counts were correct.  The abdominal fascia was closed using running 0 PDS.  The skin was closed with skin staples. The patient's Louie catheter remained in place.  was removed.  Patient was extubated and brought to the recovery room in stable condition.    Description of the findings of the procedure: large perforation at the base of the appendix. There was fecal contamination in the abdomen. There was abscess in the pelvis. I could not get a laparoscopic stapler below the level of the perforation. The cecum was very inflamed secondary to the appendix. I converted to open and had to remove the proximal cecum with the appendix using a 75mm JUSTINA. 10 flat LESLIE placed into the pelvis.     Estimated Blood Loss: 100 mL    Instrument and lap counts correct     Complications none          Specimens:   Specimen  (12h ago, onward)     Start     Ordered    05/30/20 4579  Specimen to Pathology - Surgery  Once     Comments:  Pre-op Diagnosis: Acute appendicitis [K35.80]Procedure(s):APPENDECTOMY, LAPAROSCOPIC Number of specimens: 1Name of specimens: Appendix with partial cecum      05/30/20 2331

## 2020-05-31 NOTE — ED PROVIDER NOTES
Encounter Date: 5/30/2020       History     Chief Complaint   Patient presents with    Groin Pain     RT ,ONSET THURSDAY     HPI     Seen and examined. Presented with fever and abdominal pain. This is an acute exacerbation. It began last Thursday per triage note. He reports fever and noted lower abdominal pain. He denied associated chest pain or SOB. This acute over last few days. He is uncomfortable. Pain is severe. There is associated diaphoresis. Some improvement with home tylenol. No additional associated alleviating factors. Pain is sharp and sticking.                    Review of patient's allergies indicates:  No Known Allergies  Past Medical History:   Diagnosis Date    Diabetes mellitus     RECENT DX 3 weeks ago 3/2015    Hypertension     Kidney stone      Past Surgical History:   Procedure Laterality Date    arm fracture surgery      FRACTURE SURGERY      Left arm     History reviewed. No pertinent family history.  Social History     Tobacco Use    Smoking status: Never Smoker    Smokeless tobacco: Never Used   Substance Use Topics    Alcohol use: No    Drug use: No     Review of Systems   Constitutional: Positive for fever.   HENT: Negative for sore throat.    Respiratory: Negative for shortness of breath.    Cardiovascular: Negative for chest pain.   Gastrointestinal: Positive for abdominal pain. Negative for nausea.   Genitourinary: Negative for dysuria.   Musculoskeletal: Negative for back pain.   Skin: Negative for rash.   Neurological: Negative for weakness.   Hematological: Does not bruise/bleed easily.   Psychiatric/Behavioral: Negative for behavioral problems.   All other systems reviewed and are negative.      Physical Exam     Initial Vitals [05/30/20 1501]   BP Pulse Resp Temp SpO2   113/62 90 16 (!) 102.8 °F (39.3 °C) 100 %      MAP       --         Physical Exam    Constitutional: He appears well-developed and well-nourished.   HENT:   Head: Normocephalic and atraumatic.   Eyes:  Conjunctivae are normal.   Cardiovascular: Normal rate and regular rhythm.   Pulmonary/Chest:   No resp distress   Abdominal: Soft. Normal appearance. There is tenderness. There is guarding.   Musculoskeletal: Normal range of motion.   Neurological: He is alert and oriented to person, place, and time.   Skin: Skin is warm and dry.   Psychiatric: He has a normal mood and affect. His speech is normal.         ED Course   Procedures  Labs Reviewed   CBC W/ AUTO DIFFERENTIAL - Abnormal; Notable for the following components:       Result Value    RBC 4.58 (*)     Hemoglobin 13.5 (*)     RDW 15.3 (*)     Immature Granulocytes 0.6 (*)     Gran # (ANC) 10.2 (*)     Immature Grans (Abs) 0.07 (*)     Lymph # 0.9 (*)     Gran% 83.7 (*)     Lymph% 7.6 (*)     All other components within normal limits   COMPREHENSIVE METABOLIC PANEL - Abnormal; Notable for the following components:    Sodium 134 (*)     CO2 19 (*)     Glucose 184 (*)     BUN, Bld 37 (*)     Creatinine 2.1 (*)     eGFR if  34.1 (*)     eGFR if non  29.5 (*)     All other components within normal limits   LACTIC ACID, PLASMA - Abnormal; Notable for the following components:    Lactate (Lactic Acid) 2.0 (*)     All other components within normal limits    Narrative:     LA critical result(s) repeated. Called and verbal readback obtained   from Mary Valero RN/ED by TAHIR 05/30/2020 17:14   POCT GLUCOSE - Abnormal; Notable for the following components:    POC Glucose 182 (*)     All other components within normal limits   POCT GLUCOSE - Abnormal; Notable for the following components:    POC Glucose 163 (*)     All other components within normal limits   CULTURE, BLOOD   CULTURE, BLOOD   LIPASE   SARS-COV-2 RNA AMPLIFICATION, QUAL   B-TYPE NATRIURETIC PEPTIDE   LACTIC ACID, PLASMA   URINALYSIS   URINALYSIS, REFLEX TO URINE CULTURE   HEMOGLOBIN A1C   TISSUE SPECIMEN TO PATHOLOGY - SURGERY   POCT GLUCOSE MONITORING CONTINUOUS   POCT  GLUCOSE MONITORING CONTINUOUS   POCT GLUCOSE MONITORING CONTINUOUS        ECG Results          EKG 12-lead (In process)  Result time 05/30/20 16:37:06    In process by Interface, Lab In [facility] (05/30/20 16:37:06)                 Narrative:    Test Reason : R61,    Vent. Rate : 088 BPM     Atrial Rate : 088 BPM     P-R Int : 178 ms          QRS Dur : 074 ms      QT Int : 338 ms       P-R-T Axes : 032 042 047 degrees     QTc Int : 408 ms    Normal sinus rhythm  Nonspecific T wave abnormality  Abnormal ECG  When compared with ECG of 06-MAY-2015 12:42,  Vent. rate has increased BY  47 BPM  Nonspecific T wave abnormality now evident in Anterior-lateral leads    Referred By: [email or web address]   SELF           Confirmed By:                   In process by Interface, Lab In [facility] (05/30/20 15:42:21)                 Narrative:    Test Reason : R61,    Vent. Rate : 088 BPM     Atrial Rate : 088 BPM     P-R Int : 178 ms          QRS Dur : 074 ms      QT Int : 338 ms       P-R-T Axes : 032 042 047 degrees     QTc Int : 408 ms    Normal sinus rhythm  Nonspecific T wave abnormality  Abnormal ECG  When compared with ECG of 06-MAY-2015 12:42,  Vent. rate has increased BY  47 BPM  Nonspecific T wave abnormality now evident in Anterior-lateral leads    Referred By: [email or web address]   SELF           Confirmed By:                             Imaging Results          X-Ray Chest 1 View (Edited Result - FINAL)  Result time 05/30/20 19:40:11    Final result by Munir aHnna MD (05/30/20 19:40:11)                 Narrative:    CLINICAL HISTORY:  75 years (1945) Male    TECHNIQUE:  Portable AP radiograph the chest.    COMPARISON:  Radiograph of the chest most recently from August 23, 2018.    FINDINGS:  There are faint linear opacities at the lung bases suggestive of  atelectasis/scarring, and less likely aspiration/pneumonia or trace  edema in the appropriate clinical setting. Costophrenic angles are  seen without effusion. No pneumothorax  is identified. The heart is  normal in size. The mediastinum is within normal limits. Osseous  structures appear within normal limits. The visualized upper abdomen  is unremarkable.    IMPRESSION:  Tiny interstitial opacities at left lung base as above.              .            Electronically Signed by STEPHANIE Mar. on 5/30/2020 7:45 PM                             CT Abdomen Pelvis  Without Contrast (Edited Result - FINAL)  Result time 05/30/20 16:56:06   Procedure changed from CT Abdomen Pelvis With Contrast     Final result by Munir Hanna MD (05/30/20 16:56:06)                 Narrative:    CMS MANDATED QUALITY DATA - CT RADIATION  436    All CT scans at this facility utilize dose modulation, iterative  reconstruction, and/or weight based dosing when appropriate to reduce  radiation dose to as low as reasonably achievable.    CLINICAL HISTORY:  75 years (1945) Male Infection, abdomen-pelvis    TECHNIQUE:  CT ABDOMEN PELVIS WITHOUT CONTRAST. 347 images obtained. Axial CT  images of the abdomen and pelvis were obtained from the dome of the  diaphragm to the proximal thigh.    CONTRAST:  No IV contrast was administered    COMPARISON:  CT most recently from August 23, 2018    FINDINGS:  Evaluation of the solid organs, infection and vasculature is  suboptimal without contrast.    Lower Thorax:  Near bandlike areas of atelectasis versus scarring are seen in the  dependent lower lobes bilaterally. There is mild bronchiectasis in the  posterior segment of both lower lobes. The heart is normal in size  with scattered coronary arterial calcifications.    CT Abdomen:  Liver: The liver is normal in size and imaging appearance.  Gallbladder: The gallbladder is within normal limits.  Biliary Tree: No intra or extrahepatic ductal dilation.  Spleen: Within normal limits.  Pancreas: The pancreas is normal.  Adrenal Glands: The adrenal glands appear within normal limits.  Kidneys: No hydronephrosis,  hydroureter or finding of obstructive  uropathy. There is a 3 mm nonobstructing inferior pole right renal  stone, with 1 mm adjacent stone, and a 1 mm nonobstructing upper pole  right renal stone.  Vasculature: The aorta is normal in course and caliber.  Lymph nodes: No abdominal lymphadenopathy is seen.  Intraperitoneal structures: Trace periappendiceal fluid and  intra-abdominal free air, for example punctate foci which are  perihepatic (axial image 47, 50, 59), and in the right lower quadrant  (image 54-57).  Bowel: The appendix is dilated/distended measuring 15 mm in diameter  with moderate periappendiceal fat stranding/edema. There punctate  extraluminal foci of gas (image 154-156). No discrete abscess is  identified on this noncontrasted exam. There is mild circumferential  small bowel wall thickening in the distal ileal small bowel adjacent  to the appendix. There is a moderate volume of stool and gas in the  colon with a nonobstructive bowel gas pattern.  Abdominal wall: Small bilateral fat-containing inguinal hernias.  Musculoskeletal: There is degenerative disc disease and facet  arthropathy in the lumbar spine with no aggressive appearing lytic or  blastic lesions .    CT Pelvis:  Bladder: The urinary bladder is within normal limits.  Reproductive Organs: The prostate and seminal vesicles are within  normal limits.  Pelvic Lymph nodes: No pelvic lymphadenopathy or pelvic mass is  identified.    IMPRESSION:  1. Findings of acute appendicitis (with a markedly dilated appendix,  periappendiceal fat stranding/edema, trace fluid, and punctate foci of  intra-abdominal free air). No abscess is identified within the limits  of this noncontrast exam.  2. Small nonobstructing right-sided renal stones.  3. Additional, and incidental findings as outlined above.                    RESULT NOTIFICATION: These observations were discussed by the  dictating physician by phone with, and acknowledged by HANY FLOYD JR at  5/30/2020 5:01 PM.  .    Electronically Signed by STEPHANIE Mar on 5/30/2020 5:04 PM                               Medical Decision Making:   Initial Assessment:   Presented with abdominal pain and fever. Initial concern for diverticular disease.  This was identified as appy on ct. Covered with abx.Patient meets criteria for severe sepsis, and has not had hypotension. I think is related to his acute apendicitis. He does not appear toxic and does not appear to be in septic shock. His lab values show his wbc count just above 12. He has ckd and an juliet on ckd but is above 2 on his creatinine. I feel this may be due to volume depletion but his baseline looks to be 1.5-1.7 so this is not as severe as if kidney function were normal. Lactic was 2.0 This is also borderline. Repeat was down trendinng.  Admitted to medicine and surgery to take patient to OR emergently.                                 Clinical Impression:       ICD-10-CM ICD-9-CM   1. Diaphoresis R61 780.8   2. Acute appendicitis K35.80 540.9   3. Pre-op chest exam Z01.811 V72.82             ED Disposition Condition    Admit                           Alvin Espinoza Jr., MD  05/31/20 0110       Alvin Espinoza Jr., MD  05/31/20 0112       Alvin Espinoza Jr., MD  06/01/20 1542

## 2020-05-31 NOTE — TRANSFER OF CARE
"Anesthesia Transfer of Care Note    Patient: Ruy Ramos Jr.    Procedure(s) Performed: Procedure(s) (LRB):  LAPROSCOPIC APPENDECTOMY ATTEMPTED, CONVERTED TO OPEN. (N/A)    Patient location: PACU    Anesthesia Type: general    Transport from OR: Transported from OR on room air with adequate spontaneous ventilation    Post pain: adequate analgesia    Post assessment: no apparent anesthetic complications and tolerated procedure well    Post vital signs: stable    Level of consciousness: awake and oriented    Nausea/Vomiting: no nausea/vomiting    Complications: none    Transfer of care protocol was followed      Last vitals:   Visit Vitals  BP (!) 144/68 (BP Location: Right arm, Patient Position: Lying)   Pulse 94   Temp 36.5 °C (97.7 °F) (Temporal)   Resp 20   Ht 5' 9" (1.753 m)   Wt 79.4 kg (175 lb)   SpO2 96%   BMI 25.84 kg/m²     "

## 2020-06-01 PROBLEM — A41.9 SEVERE SEPSIS: Status: RESOLVED | Noted: 2020-05-30 | Resolved: 2020-06-01

## 2020-06-01 PROBLEM — R65.20 SEVERE SEPSIS: Status: RESOLVED | Noted: 2020-05-30 | Resolved: 2020-06-01

## 2020-06-01 PROBLEM — K35.80 ACUTE APPENDICITIS: Status: RESOLVED | Noted: 2020-05-30 | Resolved: 2020-06-01

## 2020-06-01 LAB
ALBUMIN SERPL BCP-MCNC: 3.2 G/DL (ref 3.5–5.2)
ALP SERPL-CCNC: 52 U/L (ref 55–135)
ALT SERPL W/O P-5'-P-CCNC: 16 U/L (ref 10–44)
ANION GAP SERPL CALC-SCNC: 12 MMOL/L (ref 8–16)
AST SERPL-CCNC: 16 U/L (ref 10–40)
BASOPHILS NFR BLD: 0 % (ref 0–1.9)
BILIRUB SERPL-MCNC: 0.8 MG/DL (ref 0.1–1)
BUN SERPL-MCNC: 32 MG/DL (ref 8–23)
CALCIUM SERPL-MCNC: 9.1 MG/DL (ref 8.7–10.5)
CHLORIDE SERPL-SCNC: 105 MMOL/L (ref 95–110)
CO2 SERPL-SCNC: 19 MMOL/L (ref 23–29)
CREAT SERPL-MCNC: 1.9 MG/DL (ref 0.5–1.4)
DIFFERENTIAL METHOD: ABNORMAL
EOSINOPHIL NFR BLD: 0 % (ref 0–8)
ERYTHROCYTE [DISTWIDTH] IN BLOOD BY AUTOMATED COUNT: 15.3 % (ref 11.5–14.5)
EST. GFR  (AFRICAN AMERICAN): 38.5 ML/MIN/1.73 M^2
EST. GFR  (NON AFRICAN AMERICAN): 33.3 ML/MIN/1.73 M^2
GLUCOSE SERPL-MCNC: 146 MG/DL (ref 70–110)
GLUCOSE SERPL-MCNC: 147 MG/DL (ref 70–110)
GLUCOSE SERPL-MCNC: 156 MG/DL (ref 70–110)
GLUCOSE SERPL-MCNC: 173 MG/DL (ref 70–110)
GLUCOSE SERPL-MCNC: 181 MG/DL (ref 70–110)
HCT VFR BLD AUTO: 36.4 % (ref 40–54)
HGB BLD-MCNC: 12 G/DL (ref 14–18)
IMM GRANULOCYTES # BLD AUTO: ABNORMAL K/UL (ref 0–0.04)
IMM GRANULOCYTES NFR BLD AUTO: ABNORMAL % (ref 0–0.5)
LYMPHOCYTES NFR BLD: 10 % (ref 18–48)
MAGNESIUM SERPL-MCNC: 2.1 MG/DL (ref 1.6–2.6)
MCH RBC QN AUTO: 29.3 PG (ref 27–31)
MCHC RBC AUTO-ENTMCNC: 33 G/DL (ref 32–36)
MCV RBC AUTO: 89 FL (ref 82–98)
MONOCYTES NFR BLD: 2 % (ref 4–15)
NEUTROPHILS NFR BLD: 88 % (ref 38–73)
NRBC BLD-RTO: 0 /100 WBC
PHOSPHATE SERPL-MCNC: 3.7 MG/DL (ref 2.7–4.5)
PLATELET # BLD AUTO: 183 K/UL (ref 150–350)
PMV BLD AUTO: 10.5 FL (ref 9.2–12.9)
POTASSIUM SERPL-SCNC: 4.2 MMOL/L (ref 3.5–5.1)
PROT SERPL-MCNC: 7.1 G/DL (ref 6–8.4)
RBC # BLD AUTO: 4.1 M/UL (ref 4.6–6.2)
SODIUM SERPL-SCNC: 136 MMOL/L (ref 136–145)
WBC # BLD AUTO: 9.69 K/UL (ref 3.9–12.7)

## 2020-06-01 PROCEDURE — 36415 COLL VENOUS BLD VENIPUNCTURE: CPT

## 2020-06-01 PROCEDURE — 97161 PT EVAL LOW COMPLEX 20 MIN: CPT

## 2020-06-01 PROCEDURE — 85027 COMPLETE CBC AUTOMATED: CPT

## 2020-06-01 PROCEDURE — 63600175 PHARM REV CODE 636 W HCPCS: Performed by: NURSE PRACTITIONER

## 2020-06-01 PROCEDURE — 80053 COMPREHEN METABOLIC PANEL: CPT

## 2020-06-01 PROCEDURE — 84100 ASSAY OF PHOSPHORUS: CPT

## 2020-06-01 PROCEDURE — 83735 ASSAY OF MAGNESIUM: CPT

## 2020-06-01 PROCEDURE — 99900035 HC TECH TIME PER 15 MIN (STAT)

## 2020-06-01 PROCEDURE — 85007 BL SMEAR W/DIFF WBC COUNT: CPT

## 2020-06-01 PROCEDURE — 25000003 PHARM REV CODE 250: Performed by: SURGERY

## 2020-06-01 PROCEDURE — 12000002 HC ACUTE/MED SURGE SEMI-PRIVATE ROOM

## 2020-06-01 PROCEDURE — 63600175 PHARM REV CODE 636 W HCPCS: Performed by: SURGERY

## 2020-06-01 PROCEDURE — 94799 UNLISTED PULMONARY SVC/PX: CPT

## 2020-06-01 PROCEDURE — 94761 N-INVAS EAR/PLS OXIMETRY MLT: CPT

## 2020-06-01 PROCEDURE — 25000003 PHARM REV CODE 250: Performed by: INTERNAL MEDICINE

## 2020-06-01 PROCEDURE — 27000221 HC OXYGEN, UP TO 24 HOURS

## 2020-06-01 PROCEDURE — 97116 GAIT TRAINING THERAPY: CPT

## 2020-06-01 PROCEDURE — 63600175 PHARM REV CODE 636 W HCPCS: Performed by: INTERNAL MEDICINE

## 2020-06-01 RX ORDER — TELMISARTAN 40 MG/1
40 TABLET ORAL DAILY
Status: DISCONTINUED | OUTPATIENT
Start: 2020-06-01 | End: 2020-06-03 | Stop reason: HOSPADM

## 2020-06-01 RX ORDER — ENOXAPARIN SODIUM 100 MG/ML
40 INJECTION SUBCUTANEOUS
Status: DISCONTINUED | OUTPATIENT
Start: 2020-06-01 | End: 2020-06-02

## 2020-06-01 RX ADMIN — HYDROCODONE BITARTRATE AND ACETAMINOPHEN 1 TABLET: 5; 325 TABLET ORAL at 05:06

## 2020-06-01 RX ADMIN — POLYETHYLENE GLYCOL 3350 17 G: 17 POWDER, FOR SOLUTION ORAL at 08:06

## 2020-06-01 RX ADMIN — PIPERACILLIN SODIUM AND TAZOBACTAM SODIUM 4.5 G: 4; .5 INJECTION, POWDER, LYOPHILIZED, FOR SOLUTION INTRAVENOUS at 09:06

## 2020-06-01 RX ADMIN — PIPERACILLIN SODIUM AND TAZOBACTAM SODIUM 4.5 G: 4; .5 INJECTION, POWDER, LYOPHILIZED, FOR SOLUTION INTRAVENOUS at 02:06

## 2020-06-01 RX ADMIN — PANTOPRAZOLE SODIUM 40 MG: 40 TABLET, DELAYED RELEASE ORAL at 05:06

## 2020-06-01 RX ADMIN — ENOXAPARIN SODIUM 40 MG: 100 INJECTION SUBCUTANEOUS at 05:06

## 2020-06-01 RX ADMIN — HYDROCODONE BITARTRATE AND ACETAMINOPHEN 1 TABLET: 5; 325 TABLET ORAL at 09:06

## 2020-06-01 RX ADMIN — TELMISARTAN 40 MG: 40 TABLET ORAL at 08:06

## 2020-06-01 RX ADMIN — FLUCONAZOLE 100 MG: 2 INJECTION, SOLUTION INTRAVENOUS at 12:06

## 2020-06-01 RX ADMIN — PIPERACILLIN SODIUM AND TAZOBACTAM SODIUM 4.5 G: 4; .5 INJECTION, POWDER, LYOPHILIZED, FOR SOLUTION INTRAVENOUS at 06:06

## 2020-06-01 RX ADMIN — HYDROMORPHONE HYDROCHLORIDE 1 MG: 1 INJECTION, SOLUTION INTRAMUSCULAR; INTRAVENOUS; SUBCUTANEOUS at 08:06

## 2020-06-01 NOTE — PLAN OF CARE
06/01/20 0732   PRE-TX-O2   O2 Device (Oxygen Therapy) nasal cannula   $ Is the patient on Low Flow Oxygen? Yes   Flow (L/min) 4   SpO2 (!) 92 %   Pulse Oximetry Type Continuous   $ Pulse Oximetry - Multiple Charge Pulse Oximetry - Multiple   Pulse 98   Resp 18   Temp 99.6 °F (37.6 °C)   BP (!) 151/79   Respiratory Evaluation   $ Care Plan Tech Time 15 min

## 2020-06-01 NOTE — PROGRESS NOTES
"Pharmacist Renal Dose Adjustment Note    Ruy Ramos Jr. is a 75 y.o. male being treated with the medication Enoxaparin    Patient Data:    Vital Signs (Most Recent):  Temp: 99.6 °F (37.6 °C) (06/01/20 0732)  Pulse: 98 (06/01/20 0732)  Resp: 18 (06/01/20 0732)  BP: (!) 151/79 (06/01/20 0732)  SpO2: (!) 92 % (06/01/20 0732)   Vital Signs (72h Range):  Temp:  [97.6 °F (36.4 °C)-102.8 °F (39.3 °C)]   Pulse:  []   Resp:  [16-32]   BP: (103-156)/(52-83)   SpO2:  [92 %-100 %]      Recent Labs   Lab 05/30/20  1543 05/31/20  0328 06/01/20  0412   CREATININE 2.1* 1.9* 1.9*     Ht: 5' 9" (1.753 m)  Wt: 73.9 kg (162 lb 14.7 oz)  Estimated Creatinine Clearance: 32.6 mL/min (A) (based on SCr of 1.9 mg/dL (H)).  Body mass index is 24.06 kg/m².    Enoxaparin 30 mg SQ Daily will be changed to Enoxaparin 40 mg SQ Daily.    Pharmacist's Name: João Marcus  Pharmacist's Extension: [phone]    "

## 2020-06-01 NOTE — PROGRESS NOTES
[facility] Medicine    Progress Note    Patient Name: Ruy Ramos Jr.  MRN: [id number]  Patient Class: IP- Inpatient   Admission Date: 5/30/2020  3:03 PM  Length of Stay: 2  Attending Physician: JR JAMISON MD  Primary Care Provider: Kanu Medeiros MD  Face-to-Face encounter date: 06/01/2020  Chief Complaint: Groin Pain (RT ,ONSET THURSDAY)         Patient ID: Ruy Ramos Jr. is a 75 y.o. male admitted for   Active Hospital Problems    Diagnosis  POA    *Acute appendicitis with perforation, localized peritonitis, and abscess, without gangrene [K35.33]  Yes    Severe sepsis [A41.9, R65.20]  Yes    KINJAL (acute kidney injury) [N17.9]  Yes    Acute appendicitis [K35.80]  Yes    Type 2 diabetes mellitus without complication [E11.9]  Yes      Resolved Hospital Problems   No resolved problems to display.      HISTORY OF PRESENT ILLNESS by Palma Serrano NP 05/30/2020:   Ruy Ramos Jr. is a 75 y.o. old male who  has a past medical history of Diabetes mellitus, Hypertension, and Kidney stone.. The patient presented to [facility] on [facility]/30/2020 with a primary complaint of Groin Pain (RT ,ONSET THURSDAY)     75-year-old  male presents emergency room with fever and abdominal pain.    The patient states for the last 2-3 days he has been having fever, chills and right lower quadrant abdominal pain.  He was also having right chest rib pain.  The patient describes his pain as a sharp pressure sensation that worsens with palpation and movement.    The patient states he had been taking Tylenol and ibuprofen without improvement in his symptoms.    The patient states today he had a temperature of a 102° and his right lower quadrant abdominal pain got progressively and intensely worse.  This concerned him so he came to the emergency room for further evaluation.   Upon arrival in emergency room the patient was found to be hypotensive febrile and tachycardic.  He  was given IV fluid boluses and diagnostic imaging was ordered.   A CT of the abdomen and pelvis did reveal an acute appendicitis with microperforations.  Surgery was consulted by the ER physician and the plan is for emergent appendectomy.  The patient also had acute kidney injury most likely sepsis secondary to infection and nephrotoxin medications complicated by dehydration.  IV antibiotics were started in the emergency room      COVID-19 test was negative    Subjective:    Interval History: Patient seen and examined this morning.  Patient sitting up in bed and eating his Jell-O.  Reports his abdominal pain is controlled right now.  Has been tolerating his full liquid diet.  Denied nausea or vomiting.  Reports no BM so far  No Family present at bedside.  No concerns/issues overnight reported by the patient or the nursing staff.    Review of Systems All other Review of Systems were found to be negative expect for that mentioned already in HPI.     Objective:     Physical Exam  Vitals:    06/01/20 0732   BP: (!) 151/79   Pulse: 98   Resp: 18   Temp: 99.6 °F (37.6 °C)     Wt Readings from Last 1 Encounters:   06/01/20 73.9 kg (162 lb 14.7 oz)      Body mass index is 24.06 kg/m².    Vitals reviewed.  Constitutional: No distress.  Sitting comfortably in bed  HENT: NC, not on supplemental oxygen  Head: Atraumatic.   Cardiovascular: Normal rate, regular rhythm and normal heart sounds.   Pulmonary/Chest: Effort normal. No wheezes.   Abdominal:  abdominal girdle on, LESLIE drain noted in the left lower quadrant with serosanguineous drainage.  Appropriately tender Bowel sounds present.   Neurological: Alert.   Skin: Skin is warm and dry.   Psych: Appropriate mood and affect    Following labs were Reviewed   CBC:  Recent Labs   Lab 06/01/20  0412   WBC 9.69   HGB 12.0*   HCT 36.4*        CMP:  Recent Labs   Lab 06/01/20  0412   CALCIUM 9.1   ALBUMIN 3.2*   PROT 7.1      K 4.2   CO2 19*      BUN 32*   CREATININE  1.9*   ALKPHOS 52*   ALT 16   AST 16   BILITOT 0.8     Last 72 hour POCT GLUCOSE  No results found for: POCTGLUCOSE     Microbiology Results (last 7 days)     Procedure Component Value Units Date/Time    Blood Culture #1 **CANNOT BE ORDERED STAT** [[id number]] Collected:  05/30/20 1715    Order Status:  Completed Specimen:  Blood from Peripheral, Antecubital, Left Updated:  05/31/20 1832     Blood Culture, Routine No Growth to date      No Growth to date    Blood Culture #2 **CANNOT BE ORDERED STAT** [[id number]] Collected:  05/30/20 1730    Order Status:  Completed Specimen:  Blood from Peripheral, Antecubital, Left Updated:  05/31/20 1832     Blood Culture, Routine No Growth to date      No Growth to date            X-Ray Chest 1 View   Final Result   IMPRESSION:  Tiny interstitial opacities at left lung base as above.    Electronically Signed by STEPHANIE Mar on 5/30/2020 7:45 PM     CT Abdomen Pelvis  Without Contrast   Final Result   IMPRESSION:  1. Findings of acute appendicitis (with a markedly dilated appendix,  periappendiceal fat stranding/edema, trace fluid, and punctate foci of  intra-abdominal free air). No abscess is identified within the limits  of this noncontrast exam.  2. Small nonobstructing right-sided renal stones.  3. Additional, and incidental findings as outlined above.    RESULT NOTIFICATION: These observations were discussed by the  dictating physician by phone with, and acknowledged by HANY FLOYD JR at 5/30/2020 5:01 PM.  .  Electronically Signed by STEPHANIE Mar on 5/30/2020 5:04 PM           Scheduled Meds:   enoxparin  40 mg Subcutaneous Q24H    fluconazole (DIFLUCAN) IVPB  100 mg Intravenous Q24H    mupirocin  1 g Nasal BID    pantoprazole  40 mg Oral Before breakfast    piperacillin-tazobactam (ZOSYN) IVPB  4.5 g Intravenous Q8H    polyethylene glycol  17 g Oral Daily     Continuous Infusions:   sodium chloride 0.9% Stopped (05/31/20 1100)     PRN  Meds:.acetaminophen, dextrose 50%, dextrose 50%, dextrose 50%, glucagon (human recombinant), glucagon (human recombinant), glucose, glucose, HYDROcodone-acetaminophen, HYDROmorphone, HYDROmorphone, insulin aspart U-100, morphine, ondansetron, ondansetron, sodium chloride 0.9%, sodium chloride 0.9%      Assessment & Plan:     Acute Appendicitis with performation  S/P open appendectomy with partial cecectomy due to large perforation at the base of the appendix, fecal contamination of the abdomen and abscess pelvis  POD # 2  Cont IV Zosyn 4.5 gms q 8* and IV Diflucan 100mg  q 24* as pt had significant perforation and contamination  Dr Gil on board, appreciate it  Pain management  WBC on admission was 12.22--> 7.01--> 9.69  Tolerating full liquids  Incentive spirometry Q hourly  Consulted PT, OOB, Ambulation     KINJAL on CKD 3 -most likely secondary to dehydration and infection  Continue IV hydration for now  Patient tolerating full liquid diet  BUN/ Cr on admission 37/2.1--> 35/1.9 --> 32/1.9  Hold all nephrotoxic Med.     Essential HTN - Hypotensive on admission and now going up  /83  Resume Telmisartan 40 mg po daily, will introduce amlodipine if still elevated     NIDDM   Low dose Novolog s/s insulin  S/S protocol     Unchanged 8 mm nodule at the left costophrenic sulcus  (seen on CT today)  Referral out pt  Send copies to PCP    Discharge Planning:   Probably tomorrow  Downgrade to tele med    Above encounter included review of the medical records, interviewing and examining the patient face-to-face, discussion with family and other health care providers, ordering and interpreting lab/test results and formulating a plan of care.     Medical Decision Making:      [_] Low Complexity  [_] Moderate Complexity  [x] High Complexity      Gideon Liao MD  Department of Hospital Medicine   [facility]

## 2020-06-01 NOTE — PLAN OF CARE
This note also relates to the following rows which could not be included:  Pulse - Cannot attach notes to unvalidated device data  Resp - Cannot attach notes to unvalidated device data

## 2020-06-01 NOTE — PT/OT/SLP EVAL
Physical Therapy Evaluation    Patient Name:  Ruy Ramos Jr.   MRN:  [id number]    Recommendations:     Discharge Recommendations:  home   Discharge Equipment Recommendations: none   Barriers to discharge: None    Assessment:     Ruy Ramos Jr. is a 75 y.o. male admitted with a medical diagnosis of Acute appendicitis with perforation, localized peritonitis, and abscess, without gangrene.  He presents with the following impairments/functional limitations:  weakness, impaired functional mobilty, decreased safety awareness, impaired cardiopulmonary response to activity, decreased coordination, gait instability, impaired endurance, pain, impaired balance, impaired self care skills. Pt supine and states has not been OOB since surgery. Motivated and agreeable to PT eval and treatment.     Rehab Prognosis: Good; patient would benefit from acute skilled PT services to address these deficits and reach maximum level of function.    Recent Surgery: Procedure(s) (LRB):  LAPROSCOPIC APPENDECTOMY ATTEMPTED, CONVERTED TO OPEN. (N/A) 2 Days Post-Op    Plan:     During this hospitalization, patient to be seen 6 x/week to address the identified rehab impairments via gait training, therapeutic activities, therapeutic exercises and progress toward the following goals:    · Plan of Care Expires:  07/01/20    Subjective     Chief Complaint: none  Patient/Family Comments/goals: home  Pain/Comfort:  · Pain Rating 1: 7/10  · Location 1: abdomen  · Pain Addressed 1: Pre-medicate for activity, Cessation of Activity, Reposition  · Pain Rating Post-Intervention 1: 7/10    Patients cultural, spiritual, Sabianism conflicts given the current situation:      Living Environment:  Pt lives at home with wife in [facility] with [facility].  Prior to admission, patients level of function was independent.  Equipment used at home: none.  DME owned (not currently used): none.  Upon discharge, patient will have assistance from wife.    Objective:      Communicated with RN prior to session.  Patient found supine with SCD, oxygen, peripheral IV, pulse ox (continuous), LESLIE drain, blood pressure cuff, telemetry  upon PT entry to room.    General Precautions: Standard, fall   Orthopedic Precautions:    Braces: (abdominal binder)     Exams:  · Cognitive Exam:  Patient is oriented to Person, Place, Time and Situation  · RLE ROM: WFL  · RLE Strength: WFL  · LLE ROM: WFL  · LLE Strength: WFL    Functional Mobility:  · Bed Mobility:     · Supine to Sit: contact guard assistance  · Transfers:     · Sit to Stand:  contact guard assistance with no AD  · Bed to Chair: contact guard assistance with  no AD  using  Stand Pivot  · Gait: 50' with IV pole in room; pt states dizziness with gait training in room; 84% on RA with gait training; at rest 93% on 3 L ; edu on PLB  · Balance: good in sitting; fair in standing      Therapeutic Activities and Exercises:   bed mobility; transfer training; PT educated pt/ family on importance of out of bed to chair and functional mobility to negate negative effects of prolonged bed rest. Will progress activity as tolerated by patient;     AM-PAC 6 CLICK MOBILITY  Total Score:22     Patient left up in chair with all lines intact, call button in reach and RN notified.    GOALS:   Multidisciplinary Problems     Physical Therapy Goals        Problem: Physical Therapy Goal    Goal Priority Disciplines Outcome Goal Variances Interventions   Physical Therapy Goal     PT, PT/OT      Description:  Goals to be met by: discharge      Patient will increase functional independence with mobility by performin. Supine to sit with Modified [place]  2. Sit to supine with Modified [place]  3. Sit to stand transfer with Supervision  4. Bed to chair transfer with Supervision   5. Gait  x 200 feet with Supervision without AD                      History:     Past Medical History:   Diagnosis Date    Diabetes mellitus     RECENT DX 3 weeks ago  3/2015    Hypertension     Kidney stone        Past Surgical History:   Procedure Laterality Date    arm fracture surgery      FRACTURE SURGERY      Left arm       Time Tracking:     PT Received On: 06/01/20  PT Start Time: 1040     PT Stop Time: 1055  PT Total Time (min): 15 min     Billable Minutes: Evaluation 7 and Gait Training 8      Megan Cage, PT  06/01/2020

## 2020-06-01 NOTE — NURSING
Report called to desiree on 1200, pt sent via wheelchair to rm 1224 with all supplies and medications

## 2020-06-01 NOTE — PROGRESS NOTES
[facility]  General Surgery  Progress Note    Subjective:     Interval History: No acute events overnight. Feeling well. No subjective fevers or chills.  Tmax 99.6  He is tolerating liquids.      Post-Op Info:  Procedure(s) (LRB):  LAPROSCOPIC APPENDECTOMY ATTEMPTED, CONVERTED TO OPEN. (N/A)   2 Days Post-Op      Medications:  Continuous Infusions:   sodium chloride 0.9% Stopped (05/31/20 1100)     Scheduled Meds:   enoxparin  40 mg Subcutaneous Q24H    fluconazole (DIFLUCAN) IVPB  100 mg Intravenous Q24H    mupirocin  1 g Nasal BID    pantoprazole  40 mg Oral Before breakfast    piperacillin-tazobactam (ZOSYN) IVPB  4.5 g Intravenous Q8H    polyethylene glycol  17 g Oral Daily    telmisartan  40 mg Oral Daily     PRN Meds:acetaminophen, dextrose 50%, dextrose 50%, dextrose 50%, glucagon (human recombinant), glucagon (human recombinant), glucose, glucose, HYDROcodone-acetaminophen, HYDROmorphone, HYDROmorphone, insulin aspart U-100, morphine, ondansetron, ondansetron, sodium chloride 0.9%, sodium chloride 0.9%     Objective:     Vital Signs (Most Recent):  Temp: 97.7 °F (36.5 °C) (06/01/20 1105)  Pulse: 88 (06/01/20 1105)  Resp: 19 (06/01/20 1105)  BP: 127/80 (06/01/20 1105)  SpO2: (!) 91 % (06/01/20 1105) Vital Signs (24h Range):  Temp:  [97.7 °F (36.5 °C)-99.6 °F (37.6 °C)] 97.7 °F (36.5 °C)  Pulse:  [76-98] 88  Resp:  [18-28] 19  SpO2:  [91 %-94 %] 91 %  BP: (127-156)/(72-83) 127/80       Intake/Output Summary (Last 24 hours) at 6/1/2020 1441  Last data filed at 6/1/2020 1200  Gross per 24 hour   Intake --   Output 3175 ml   Net -3175 ml       Physical Exam   Constitutional: He is oriented to person, place, and time. No distress.   Pulmonary/Chest: Effort normal. No respiratory distress.   Abdominal: Soft. He exhibits no distension. There is tenderness. There is no rebound and no guarding.   Abdomen is appropriately tender  Incision is clean dry and intact  LESLIE drains appropriate    Neurological: He is alert and oriented to person, place, and time.       Significant Labs:  CBC:   Recent Labs   Lab 06/01/20  0412   WBC 9.69   RBC 4.10*   HGB 12.0*   HCT 36.4*      MCV 89   MCH 29.3   MCHC 33.0     CMP:   Recent Labs   Lab 06/01/20  0412   *   CALCIUM 9.1   ALBUMIN 3.2*   PROT 7.1      K 4.2   CO2 19*      BUN 32*   CREATININE 1.9*   ALKPHOS 52*   ALT 16   AST 16   BILITOT 0.8         Assessment/Plan:     Active Diagnoses:    Diagnosis Date Noted POA    PRINCIPAL PROBLEM:  Acute appendicitis with perforation, localized peritonitis, and abscess, without gangrene [K35.33] 05/30/2020 Yes    KINJAL (acute kidney injury) [N17.9] 05/30/2020 Yes    Type 2 diabetes mellitus without complication [E11.9] 09/11/2015 Yes      Problems Resolved During this Admission:    Diagnosis Date Noted Date Resolved POA    Severe sepsis [A41.9, R65.20] 05/30/2020 06/01/2020 Yes    Acute appendicitis [K35.80] 05/30/2020 06/01/2020 Yes     -Continue zosyn and diflucan   -advance diet as tolerated.   -PT   -IS     Eusebio Gil III, MD  General Surgery  [facility]

## 2020-06-02 ENCOUNTER — CLINICAL SUPPORT (OUTPATIENT)
Dept: CARDIOLOGY | Facility: HOSPITAL | Age: 75
DRG: 853 | End: 2020-06-02
Attending: EMERGENCY MEDICINE
Payer: MEDICARE

## 2020-06-02 PROBLEM — R61 DIAPHORESIS: Status: ACTIVE | Noted: 2020-06-02

## 2020-06-02 LAB
ALBUMIN SERPL BCP-MCNC: 3.1 G/DL (ref 3.5–5.2)
ALP SERPL-CCNC: 68 U/L (ref 55–135)
ALT SERPL W/O P-5'-P-CCNC: 15 U/L (ref 10–44)
ANION GAP SERPL CALC-SCNC: 12 MMOL/L (ref 8–16)
AORTIC ROOT ANNULUS: 4 CM
AORTIC VALVE CUSP SEPERATION: 1.89 CM
AST SERPL-CCNC: 18 U/L (ref 10–40)
AV INDEX (PROSTH): 0.85
AV MEAN GRADIENT: 3 MMHG
AV PEAK GRADIENT: 7 MMHG
AV VALVE AREA: 2.82 CM2
AV VELOCITY RATIO: 74.06
BASOPHILS # BLD AUTO: 0.02 K/UL (ref 0–0.2)
BASOPHILS NFR BLD: 0.2 % (ref 0–1.9)
BILIRUB SERPL-MCNC: 0.7 MG/DL (ref 0.1–1)
BSA FOR ECHO PROCEDURE: 1.97 M2
BUN SERPL-MCNC: 32 MG/DL (ref 8–23)
CALCIUM SERPL-MCNC: 9.5 MG/DL (ref 8.7–10.5)
CHLORIDE SERPL-SCNC: 103 MMOL/L (ref 95–110)
CO2 SERPL-SCNC: 21 MMOL/L (ref 23–29)
CREAT SERPL-MCNC: 1.7 MG/DL (ref 0.5–1.4)
CV ECHO LV RWT: 0.6 CM
DIFFERENTIAL METHOD: ABNORMAL
DOP CALC AO PEAK VEL: 1.34 M/S
DOP CALC AO VTI: 17.72 CM
DOP CALC LVOT AREA: 3.3 CM2
DOP CALC LVOT DIAMETER: 2.05 CM
DOP CALC LVOT PEAK VEL: 99.24 M/S
DOP CALC LVOT STROKE VOLUME: 49.98 CM3
DOP CALCLVOT PEAK VEL VTI: 15.15 CM
E WAVE DECELERATION TIME: 160.67 MSEC
E/E' RATIO: 7.3 M/S
ECHO LV POSTERIOR WALL: 1.08 CM (ref 0.6–1.1)
EOSINOPHIL # BLD AUTO: 0 K/UL (ref 0–0.5)
EOSINOPHIL NFR BLD: 0.2 % (ref 0–8)
ERYTHROCYTE [DISTWIDTH] IN BLOOD BY AUTOMATED COUNT: 15.3 % (ref 11.5–14.5)
EST. GFR  (AFRICAN AMERICAN): 44 ML/MIN/1.73 M^2
EST. GFR  (NON AFRICAN AMERICAN): 38.1 ML/MIN/1.73 M^2
FRACTIONAL SHORTENING: 35 % (ref 28–44)
GLUCOSE SERPL-MCNC: 153 MG/DL (ref 70–110)
GLUCOSE SERPL-MCNC: 171 MG/DL (ref 70–110)
GLUCOSE SERPL-MCNC: 175 MG/DL (ref 70–110)
GLUCOSE SERPL-MCNC: 189 MG/DL (ref 70–110)
GLUCOSE SERPL-MCNC: 190 MG/DL (ref 70–110)
HCT VFR BLD AUTO: 40.4 % (ref 40–54)
HGB BLD-MCNC: 12.7 G/DL (ref 14–18)
IMM GRANULOCYTES # BLD AUTO: 0.07 K/UL (ref 0–0.04)
IMM GRANULOCYTES NFR BLD AUTO: 0.5 % (ref 0–0.5)
INTERVENTRICULAR SEPTUM: 1.08 CM (ref 0.6–1.1)
LEFT ATRIUM SIZE: 3.02 CM
LEFT INTERNAL DIMENSION IN SYSTOLE: 2.32 CM (ref 2.1–4)
LEFT VENTRICLE MASS INDEX: 61 G/M2
LEFT VENTRICULAR INTERNAL DIMENSION IN DIASTOLE: 3.58 CM (ref 3.5–6)
LEFT VENTRICULAR MASS: 119.77 G
LV LATERAL E/E' RATIO: 6 M/S
LV SEPTAL E/E' RATIO: 9.33 M/S
LYMPHOCYTES # BLD AUTO: 0.6 K/UL (ref 1–4.8)
LYMPHOCYTES NFR BLD: 4.4 % (ref 18–48)
MAGNESIUM SERPL-MCNC: 2.3 MG/DL (ref 1.6–2.6)
MCH RBC QN AUTO: 29.1 PG (ref 27–31)
MCHC RBC AUTO-ENTMCNC: 31.4 G/DL (ref 32–36)
MCV RBC AUTO: 92 FL (ref 82–98)
MONOCYTES # BLD AUTO: 0.9 K/UL (ref 0.3–1)
MONOCYTES NFR BLD: 6.8 % (ref 4–15)
MV PEAK E VEL: 0.84 M/S
NEUTROPHILS # BLD AUTO: 11.2 K/UL (ref 1.8–7.7)
NEUTROPHILS NFR BLD: 87.9 % (ref 38–73)
NRBC BLD-RTO: 0 /100 WBC
PHOSPHATE SERPL-MCNC: 3.9 MG/DL (ref 2.7–4.5)
PISA TR MAX VEL: 2.7 M/S
PLATELET # BLD AUTO: 213 K/UL (ref 150–350)
PMV BLD AUTO: 11.2 FL (ref 9.2–12.9)
POTASSIUM SERPL-SCNC: 3.9 MMOL/L (ref 3.5–5.1)
PROT SERPL-MCNC: 7.2 G/DL (ref 6–8.4)
PV PEAK VELOCITY: 110 CM/S
RA PRESSURE: 3 MMHG
RBC # BLD AUTO: 4.37 M/UL (ref 4.6–6.2)
RIGHT VENTRICULAR END-DIASTOLIC DIMENSION: 293 CM
SODIUM SERPL-SCNC: 136 MMOL/L (ref 136–145)
TDI LATERAL: 0.14 M/S
TDI SEPTAL: 0.09 M/S
TDI: 0.12 M/S
TR MAX PG: 29 MMHG
TV REST PULMONARY ARTERY PRESSURE: 32 MMHG
WBC # BLD AUTO: 12.79 K/UL (ref 3.9–12.7)

## 2020-06-02 PROCEDURE — 25000003 PHARM REV CODE 250: Performed by: NURSE PRACTITIONER

## 2020-06-02 PROCEDURE — 63600175 PHARM REV CODE 636 W HCPCS: Performed by: INTERNAL MEDICINE

## 2020-06-02 PROCEDURE — 25000003 PHARM REV CODE 250: Performed by: INTERNAL MEDICINE

## 2020-06-02 PROCEDURE — 25000003 PHARM REV CODE 250

## 2020-06-02 PROCEDURE — 63600175 PHARM REV CODE 636 W HCPCS: Performed by: NURSE PRACTITIONER

## 2020-06-02 PROCEDURE — 80053 COMPREHEN METABOLIC PANEL: CPT

## 2020-06-02 PROCEDURE — 87040 BLOOD CULTURE FOR BACTERIA: CPT

## 2020-06-02 PROCEDURE — 97116 GAIT TRAINING THERAPY: CPT | Mod: CQ

## 2020-06-02 PROCEDURE — 21000000 HC CCU ICU ROOM CHARGE

## 2020-06-02 PROCEDURE — 84100 ASSAY OF PHOSPHORUS: CPT

## 2020-06-02 PROCEDURE — 93005 ELECTROCARDIOGRAM TRACING: CPT | Performed by: INTERNAL MEDICINE

## 2020-06-02 PROCEDURE — 27000221 HC OXYGEN, UP TO 24 HOURS

## 2020-06-02 PROCEDURE — 94761 N-INVAS EAR/PLS OXIMETRY MLT: CPT

## 2020-06-02 PROCEDURE — 99900035 HC TECH TIME PER 15 MIN (STAT)

## 2020-06-02 PROCEDURE — 36415 COLL VENOUS BLD VENIPUNCTURE: CPT

## 2020-06-02 PROCEDURE — 93306 TTE W/DOPPLER COMPLETE: CPT

## 2020-06-02 PROCEDURE — 85025 COMPLETE CBC W/AUTO DIFF WBC: CPT

## 2020-06-02 PROCEDURE — 83735 ASSAY OF MAGNESIUM: CPT

## 2020-06-02 RX ORDER — METOPROLOL TARTRATE 25 MG/1
12.5 TABLET ORAL 2 TIMES DAILY
Status: DISCONTINUED | OUTPATIENT
Start: 2020-06-02 | End: 2020-06-03 | Stop reason: HOSPADM

## 2020-06-02 RX ORDER — DIGOXIN 0.25 MG/ML
125 INJECTION INTRAMUSCULAR; INTRAVENOUS ONCE
Status: COMPLETED | OUTPATIENT
Start: 2020-06-02 | End: 2020-06-02

## 2020-06-02 RX ORDER — METOPROLOL TARTRATE 1 MG/ML
5 INJECTION, SOLUTION INTRAVENOUS EVERY 5 MIN PRN
Status: DISCONTINUED | OUTPATIENT
Start: 2020-06-02 | End: 2020-06-03 | Stop reason: HOSPADM

## 2020-06-02 RX ORDER — METOPROLOL TARTRATE 1 MG/ML
INJECTION, SOLUTION INTRAVENOUS
Status: DISPENSED
Start: 2020-06-02 | End: 2020-06-02

## 2020-06-02 RX ADMIN — MUPIROCIN 1 G: 20 OINTMENT TOPICAL at 08:06

## 2020-06-02 RX ADMIN — PIPERACILLIN SODIUM AND TAZOBACTAM SODIUM 4.5 G: 4; .5 INJECTION, POWDER, LYOPHILIZED, FOR SOLUTION INTRAVENOUS at 09:06

## 2020-06-02 RX ADMIN — METOPROLOL TARTRATE 5 MG: 5 INJECTION, SOLUTION INTRAVENOUS at 08:06

## 2020-06-02 RX ADMIN — METOPROLOL TARTRATE 12.5 MG: 25 TABLET, FILM COATED ORAL at 04:06

## 2020-06-02 RX ADMIN — MUPIROCIN 1 G: 20 OINTMENT TOPICAL at 09:06

## 2020-06-02 RX ADMIN — APIXABAN 5 MG: 5 TABLET, FILM COATED ORAL at 08:06

## 2020-06-02 RX ADMIN — FLUCONAZOLE 100 MG: 2 INJECTION, SOLUTION INTRAVENOUS at 02:06

## 2020-06-02 RX ADMIN — METOPROLOL TARTRATE 5 MG: 5 INJECTION, SOLUTION INTRAVENOUS at 02:06

## 2020-06-02 RX ADMIN — PANTOPRAZOLE SODIUM 40 MG: 40 TABLET, DELAYED RELEASE ORAL at 04:06

## 2020-06-02 RX ADMIN — METOPROLOL TARTRATE 12.5 MG: 25 TABLET, FILM COATED ORAL at 08:06

## 2020-06-02 RX ADMIN — DIGOXIN 125 MCG: 0.25 INJECTION INTRAMUSCULAR; INTRAVENOUS at 03:06

## 2020-06-02 RX ADMIN — PIPERACILLIN SODIUM AND TAZOBACTAM SODIUM 4.5 G: 4; .5 INJECTION, POWDER, LYOPHILIZED, FOR SOLUTION INTRAVENOUS at 06:06

## 2020-06-02 RX ADMIN — PIPERACILLIN SODIUM AND TAZOBACTAM SODIUM 4.5 G: 4; .5 INJECTION, POWDER, LYOPHILIZED, FOR SOLUTION INTRAVENOUS at 02:06

## 2020-06-02 NOTE — NURSING
Patient in a-fib, physician aware.  EKG obtained,  New orders for Metoprolol 5mg given .  BP stable.  02 at 95% on 5 liters.  New order to transfer patient to  Room 2530.  Report given to Jannet HAMILTON on Cardiac floor.  Transferred patient via hospital bed, assisted by Bridget HAMILTON. Transferred with monitor and 02.. Patient tolerated well, alert and oriented.  Telemetry box sent back to telemetry room.

## 2020-06-02 NOTE — PROGRESS NOTES
[facility]  General Surgery  Progress Note    Subjective:     Interval History:  The patient has complaints of increasing sensation of abdominal bloating.  He reports no nausea.  He states he has not passed any flatus or last 24 hr.  Blood cultures have come back positive for gram-negative rods.  He was also found to be in an out of new onset atrial fibrillation.  He has been afebrile.  Heart rate has been ranging from 90s to 150s.    Post-Op Info:  Procedure(s) (LRB):  APPENDECTOMY, LAPAROSCOPIC VS OPEN (N/A)  APPENDECTOMY   3 Days Post-Op      Medications:  Continuous Infusions:  Scheduled Meds:   enoxparin  40 mg Subcutaneous Q24H    fluconazole (DIFLUCAN) IVPB  100 mg Intravenous Q24H    metoprolol tartrate  12.5 mg Oral BID    mupirocin  1 g Nasal BID    pantoprazole  40 mg Oral Before breakfast    piperacillin-tazobactam (ZOSYN) IVPB  4.5 g Intravenous Q8H    telmisartan  40 mg Oral Daily     PRN Meds:acetaminophen, dextrose 50%, dextrose 50%, dextrose 50%, glucagon (human recombinant), glucagon (human recombinant), glucose, glucose, HYDROcodone-acetaminophen, HYDROmorphone, insulin aspart U-100, metoprolol, morphine, ondansetron, ondansetron, sodium chloride 0.9%, sodium chloride 0.9%     Objective:     Vital Signs (Most Recent):  Temp: 98.5 °F (36.9 °C) (06/02/20 0730)  Pulse: (!) 114 (06/02/20 0730)  Resp: 20 (06/02/20 0730)  BP: 118/84 (06/02/20 0730)  SpO2: 95 % (06/02/20 0730) Vital Signs (24h Range):  Temp:  [97.5 °F (36.4 °C)-99 °F (37.2 °C)] 98.5 °F (36.9 °C)  Pulse:  [] 114  Resp:  [15-20] 20  SpO2:  [90 %-96 %] 95 %  BP: ()/(71-84) 118/84       Intake/Output Summary (Last 24 hours) at 6/2/2020 1309  Last data filed at 6/2/2020 0600  Gross per 24 hour   Intake 120 ml   Output 260 ml   Net -140 ml       Physical Exam   Constitutional: He is oriented to person, place, and time. No distress.   Pulmonary/Chest: Effort normal. No respiratory distress.   Abdominal: Soft.  He exhibits distension. There is tenderness. There is no rebound and no guarding.   Abdomen is appropriately tender  Feels to be little more distended than yesterday  Incision is clean dry and intact  LESLIE drains appropriate - there is a lot of leakage from around the entrance site.  The character is serosanguineous   Neurological: He is alert and oriented to person, place, and time.       Significant Labs:  Recent Lab Results       06/02/20  0525   06/02/20  0429   06/02/20  0235   06/01/20  2025   06/01/20  1709        Albumin   3.1           Alkaline Phosphatase   68           ALT   15           Anion Gap   12           AST   18           Baso #   0.02           Basophil%   0.2           BILIRUBIN TOTAL   0.7  Comment:  For infants and newborns, interpretation of results should be based  on gestational age, weight and in agreement with clinical  observations.  Premature Infant recommended reference ranges:  Up to 24 hours.............<8.0 mg/dL  Up to 48 hours............<12.0 mg/dL  3-5 days..................<15.0 mg/dL  6-29 days.................<15.0 mg/dL             BUN, Bld   32           Calcium   9.5           Chloride   103           CO2   21           Creatinine   1.7           Differential Method   Automated           eGFR if    44.0           eGFR if non    38.1  Comment:  Calculation used to obtain the estimated glomerular filtration  rate (eGFR) is the CKD-EPI equation.              Eos #   0.0           Eosinophil%   0.2           Glucose   189           Gran # (ANC)   11.2           Gran%   87.9           Hematocrit   40.4           Hemoglobin   12.7           Immature Grans (Abs)   0.07  Comment:  Mild elevation in immature granulocytes is non specific and   can be seen in a variety of conditions including stress response,   acute inflammation, trauma and pregnancy. Correlation with other   laboratory and clinical findings is essential.             Immature  Granulocytes   0.5           Lymph #   0.6           Lymph%   4.4           Magnesium   2.3           MCH   29.1           MCHC   31.4           MCV   92           Mono #   0.9           Mono%   6.8           MPV   11.2           nRBC   0           Phosphorus   3.9           Platelets   213           POC Glucose 190   153 147 181     Potassium   3.9           PROTEIN TOTAL   7.2           RBC   4.37           RDW   15.3           Sodium   136           WBC   12.79                                Assessment/Plan:     Active Diagnoses:    Diagnosis Date Noted POA    PRINCIPAL PROBLEM:  Acute appendicitis with perforation, localized peritonitis, and abscess, without gangrene [K35.33] 05/30/2020 Yes    Diaphoresis [R61] 06/02/2020 Yes    KINJAL (acute kidney injury) [N17.9] 05/30/2020 Yes    Type 2 diabetes mellitus without complication [E11.9] 09/11/2015 Yes      Problems Resolved During this Admission:    Diagnosis Date Noted Date Resolved POA    Severe sepsis [A41.9, R65.20] 05/30/2020 06/01/2020 Yes    Acute appendicitis [K35.80] 05/30/2020 06/01/2020 Yes     -blood cultures are positive for gram-negative rods.  Recommend to continue IV antibiotics for now.  -feeling more bloated.  Will back diet down to clear liquids for now.  Will monitor for any involving ileus.  -PT  -IS    Eusebio Gil III, MD  General Surgery  [facility], [facility]

## 2020-06-02 NOTE — PT/OT/SLP PROGRESS
Physical Therapy Treatment    Patient Name:  Ruy Ramos Jr.   MRN:  [id number]    Recommendations:     Discharge Recommendations:  home   Discharge Equipment Recommendations: none   Barriers to discharge: None    Assessment:     Ruy Ramos Jr. is a 75 y.o. male admitted with a medical diagnosis of Acute appendicitis with perforation, localized peritonitis, and abscess, without gangrene.  He presents with the following impairments/functional limitations:  weakness, impaired functional mobilty, decreased safety awareness, impaired cardiopulmonary response to activity, decreased coordination, gait instability, impaired endurance, pain, impaired balance, impaired self care skills. Patient presents sitting up in chair upon PTA entering. Agreeable to PT treatment. RN advised PTA prior to treatment to cease treatment if pt's HR sustained in the 120s. Patient tolerated gait training well however HR noted to increase into 140s. Gait training ceased and patient returned to chair. Pt not symptomatic. No LOB or SOB noted. Continue with PT and POC.    Rehab Prognosis: Good; patient would benefit from acute skilled PT services to address these deficits and reach maximum level of function.    Recent Surgery: Procedure(s) (LRB):  APPENDECTOMY, LAPAROSCOPIC VS OPEN (N/A)  APPENDECTOMY 3 Days Post-Op    Plan:     During this hospitalization, patient to be seen 6 x/week to address the identified rehab impairments via gait training, therapeutic activities, therapeutic exercises and progress toward the following goals:    · Plan of Care Expires:  07/01/20    Subjective     Chief Complaint: No specific complaints; just eager to get better  Patient/Family Comments/goals:   Pain/Comfort:  · Pain Rating 1: 0/10      Objective:     Communicated with RN prior to session.  Patient found up in chair with pulse ox (continuous), telemetry, LESLIE drain, blood pressure cuff upon PT entry to room.     General Precautions: Standard,  fall   Orthopedic Precautions:    Braces: (abdominal binder)     Functional Mobility:  · Transfers:     · Sit to Stand:  stand by assistance with no AD  · Gait: 15ft x 2 with no AD (pt pushing IV pole) and SBA; pt limited 2/2 increased HR; seated rest break between trials to allow HR to decrease      AM-PAC 6 CLICK MOBILITY          Therapeutic Activities and Exercises:  sit <> stands; transfer training    Patient left up in chair with all lines intact, call button in reach and pt's wife present..    GOALS:   Multidisciplinary Problems     Physical Therapy Goals        Problem: Physical Therapy Goal    Goal Priority Disciplines Outcome Goal Variances Interventions   Physical Therapy Goal     PT, PT/OT Ongoing, Progressing     Description:  Goals to be met by: discharge      Patient will increase functional independence with mobility by performin. Supine to sit with Modified [place]  2. Sit to supine with Modified [place]  3. Sit to stand transfer with Supervision  4. Bed to chair transfer with Supervision   5. Gait  x 200 feet with Supervision without AD                      Time Tracking:     PT Received On: 20  PT Start Time: 1140     PT Stop Time: 1150  PT Total Time (min): 10 min     Billable Minutes: Gait Training 10    Treatment Type: Treatment  PT/PTA: PTA     PTA Visit Number: 1     Ifeoma Fleming PTA  2020

## 2020-06-02 NOTE — CONSULTS
[facility]  Department of Cardiology  Consult Note      Patient name: Ruy Ramos Jr.  MRN: [id number]  Today's Date: 06/02/2020  Admit Date: 5/30/2020                          Consult Requested By: Rigoberto Gonzalez MD    SUBJECTIVE     Principal Problem: Acute appendicitis with perforation, localized peritonitis, and abscess, without gangrene      Reason for Consult:  New onset AFib with RVR      History of Present Illness:    Mr. Burrell is a 75-year-old male who has been treated acute appendicitis with perforation localized peritonitis and abscess.  He also has been treated for severe sepsis.  Patient developed AFib with RVR as a new onset today and was transferred to the cardiology floor.  Patient does see a cardiologist outpatient but has no history of stents, heart attack, or atrial fibrillation.  He does report a history of hypertension and carotid artery disease status post a right carotid endarterectomy.    At this time the patient is slightly hypotensive and is in AFib with episodes of RVR from 100s to 130s.        Review of patient's allergies indicates:  No Known Allergies    Past Medical History:   Diagnosis Date    Diabetes mellitus     RECENT DX 3 weeks ago 3/2015    Hypertension     Kidney stone      Past Surgical History:   Procedure Laterality Date    APPENDECTOMY  5/30/2020    Procedure: APPENDECTOMY;  Surgeon: Eusebio Gil III, MD;  Location: [facility] OR;  Service: General;;    arm fracture surgery      FRACTURE SURGERY      Left arm    LAPAROSCOPIC APPENDECTOMY N/A 5/30/2020    Procedure: APPENDECTOMY, LAPAROSCOPIC VS OPEN;  Surgeon: Euseboi Gil III, MD;  Location: [facility] OR;  Service: General;  Laterality: N/A;     Social History     Tobacco Use    Smoking status: Never Smoker    Smokeless tobacco: Never Used   Substance Use Topics    Alcohol use: No    Drug use: No        REVIEW OF SYSTEMS  Constitutional: Negative for chills, fatigue and fever.   Eyes:  No double vision, No blurred vision  Neuro: No headaches, No dizziness  Respiratory: Negative for cough, shortness of breath and wheezing.    Cardiovascular: Negative for chest pain. Negative for palpitations and leg swelling.   Gastrointestinal: Negative for abdominal pain, No melena, diarrhea, nausea and vomiting.   Genitourinary: Negative for dysuria and frequency, Negative for hematuria  Skin: Negative for bruising, Negative for edema or discoloration noted.   Endocrine: Negative for polyphagia, Negative for heat intolerance, Negative for cold intolerance  Psychiatric:  Positive for feeling agitated and restless, ready to go  Musculoskeletal: Negative for neck pain, Negative for muscle weakness, Negative for back pain     OBJECTIVE     Vital Signs (Most Recent)  Temp: 98.5 °F (36.9 °C) (06/02/20 0730)  Pulse: (!) 114 (06/02/20 0730)  Resp: 20 (06/02/20 0730)  BP: 118/84 (06/02/20 0730)  SpO2: 95 % (06/02/20 0730)    I & O (Last 24H):    Intake/Output Summary (Last 24 hours) at 6/2/2020 1211  Last data filed at 6/2/2020 0600  Gross per 24 hour   Intake 120 ml   Output 260 ml   Net -140 ml       WEIGHTS LAST 4 READINGS  Wt Readings from Last 4 Encounters:   06/02/20 80.8 kg (178 lb 2.1 oz)   02/07/20 78 kg (171 lb 15.3 oz)   09/21/15 80.5 kg (177 lb 8 oz)   09/11/15 81.7 kg (180 lb 1.9 oz)       PHYSICAL EXAM  GENERAL: well built, well nourished, well-developed in no apparent distress alert and oriented.   HEENT: Normocephalic. Pupils normal and conjunctivae normal.  Mucous membranes normal, no cyanosis or icterus, trachea central,no pallor or icterus is noted..   NECK: No JVD. No bruit..   CARDIAC:  Irregular rate and rhythm.  S1 is normal.S2 is normal.  CHEST ANATOMY: normal.   LUNGS: Clear to auscultation. No wheezing or rhonchi..    ABDOMEN:  Abdominal binder in place, LESLIE drain  URINARY: No wan catheter   EXTREMITIES: No cyanosis, clubbing or edema noted at this time., no calf tenderness bilaterally.    PERIPHERAL VASCULAR SYSTEM: Good palpable distal pulses.   CENTRAL NERVOUS SYSTEM: No focal motor or sensory deficits noted.   SKIN: Skin without lesions, moist, well perfused.   MUSCLE STRENGTH & TONE: No noteable weakness, atrophy or abnormal movement.     Home Medications:  No current facility-administered medications on file prior to encounter.      Current Outpatient Medications on File Prior to Encounter   Medication Sig Dispense Refill    amlodipine (NORVASC) 5 MG tablet Take 5 mg by mouth once daily.  4    aspirin (ECOTRIN) 81 MG EC tablet Take 81 mg by mouth once daily.      canagliflozin (INVOKANA) 300 mg Tab tablet Take 300 mg by mouth once daily.      CYANOCOBALAMIN/FA/PYRIDOXINE (FOLPLEX 2.2 ORAL) Take 1 tablet by mouth once daily.       ezetimibe (ZETIA) 10 mg tablet Take 10 mg by mouth once daily.       gemfibrozil (LOPID) 600 MG tablet Take 600 mg by mouth 2 (two) times daily before meals.      L. acidophilus/L. rhamnosus (DIGESTIVE HEALTH PROBIOTIC ORAL) Take 1 tablet by mouth once daily.      MULTIVITAMIN ORAL Take 1 tablet by mouth once daily.       omega 3-dha-epa-fish oil (OMEGA 3 FISH OIL) 900-1,400 mg CpDR Take 2 capsules by mouth 2 (two) times daily.       pioglitazone (ACTOS) 15 MG tablet Take 15 mg by mouth once daily.      telmisartan (MICARDIS) 40 MG Tab Take 40 mg by mouth once daily.      vitamin D (VITAMIN D3) 1000 units Tab Take 1,000 Units by mouth once daily.      ACCU-CHEK AUDREY PLUS METER [facility]       ACCU-CHEK AUDREY PLUS TEST STRP Str[facility]       ACCU-CHEK SOFTCLIX LANCETS Misc          Scheduled Meds:   enoxparin  40 mg Subcutaneous Q24H    fluconazole (DIFLUCAN) IVPB  100 mg Intravenous Q24H    metoprolol tartrate  12.5 mg Oral BID    mupirocin  1 g Nasal BID    pantoprazole  40 mg Oral Before breakfast    piperacillin-tazobactam (ZOSYN) IVPB  4.5 g Intravenous Q8H    telmisartan  40 mg Oral Daily       Continuous Infusions:    PRN Meds:acetaminophen, dextrose  50%, dextrose 50%, dextrose 50%, glucagon (human recombinant), glucagon (human recombinant), glucose, glucose, HYDROcodone-acetaminophen, HYDROmorphone, insulin aspart U-100, metoprolol, morphine, ondansetron, ondansetron, sodium chloride 0.9%, sodium chloride 0.9%    LABS AND DIAGNOSTICS     CBC LAST 3 DAYS  Recent Labs   Lab 05/30/20  1543 05/31/20  0328 06/01/20 0412 06/02/20  0429   WBC 12.22 7.01 9.69 12.79*   RBC 4.58* 4.06* 4.10* 4.37*   HGB 13.5* 11.9* 12.0* 12.7*   HCT 41.4 36.9* 36.4* 40.4   MCV 90 91 89 92   MCH 29.5 29.3 29.3 29.1   MCHC 32.6 32.2 33.0 31.4*   RDW 15.3* 15.1* 15.3* 15.3*    162 183 213   MPV 11.1 11.3 10.5 11.2   GRAN 83.7*  10.2* 61.0 88.0* 87.9*  11.2*   LYMPH 7.6*  0.9* 5.0* 10.0* 4.4*  0.6*   MONO 7.9  1.0 4.0 2.0* 6.8  0.9   BASO 0.03  --   --  0.02   NRBC 0 0 0 0       COAGULATION LAST 3 DAYS  No results for input(s): LABPT, INR, APTT in the last 168 hours.    CHEMISTRY LAST 3 DAYS  Recent Labs   Lab 05/31/20  0328 06/01/20 0412 06/02/20  0429   * 136 136   K 4.7 4.2 3.9    105 103   CO2 16* 19* 21*   ANIONGAP 10 12 12   BUN 35* 32* 32*   CREATININE 1.9* 1.9* 1.7*   * 146* 189*   CALCIUM 8.4* 9.1 9.5   MG 1.8 2.1 2.3   ALBUMIN 3.1* 3.2* 3.1*   PROT 6.6 7.1 7.2   ALKPHOS 63 52* 68   ALT 17 16 15   AST 18 16 18   BILITOT 0.9 0.8 0.7       CARDIAC PROFILE LAST 3 DAYS  Recent Labs   Lab 05/30/20  1946   BNP 36       ENDOCRINE LAST 3 DAYS  No results for input(s): TSH, PROCAL in the last 168 hours.    LAST ARTERIAL BLOOD GAS  ABG  No results for input(s): PH, PO2, PCO2, HCO3, BE in the last 168 hours.    LAST 7 DAYS MICROBIOLOGY   Microbiology Results (last 7 days)     Procedure Component Value Units Date/Time    Blood culture [[id number]]     Order Status:  Sent Specimen:  Blood     Blood Culture #1 **CANNOT BE ORDERED STAT** [[id number]] Collected:  05/30/20 1715    Order Status:  Completed Specimen:  Blood from Peripheral, Antecubital, Left Updated:   06/02/20 1116     Blood Culture, Routine Gram stain michel bottle: Gram negative rods      Results called to and read back by:Magui Santiago RN A-CARD  06/02/2020        11:15 JBM    Blood Culture #2 **CANNOT BE ORDERED STAT** [[id number]] Collected:  05/30/20 1730    Order Status:  Completed Specimen:  Blood from Peripheral, Antecubital, Left Updated:  06/02/20 1116     Blood Culture, Routine Gram stain michel bottle: Gram negative rods      Results called to and read back by:Magui Santiago RN A-CARD  06/02/2020        11:15 JBM          MOST RECENT IMAGING    FINDINGS:  There are faint linear opacities at the lung bases suggestive of  atelectasis/scarring, and less likely aspiration/pneumonia or trace  edema in the appropriate clinical setting. Costophrenic angles are  seen without effusion. No pneumothorax is identified. The heart is  normal in size. The mediastinum is within normal limits. Osseous  structures appear within normal limits. The visualized upper abdomen  is unremarkable.    IMPRESSION:  Tiny interstitial opacities at left lung base as above.  ECHOCARDIOGRAM RESULTS (last 10)  No results found for this or any previous visit.    CURRENT/PREVIOUS VISIT EKG  Results for orders placed or performed during the hospital encounter of 05/30/20   EKG 12-lead    Collection Time: 06/02/20  2:28 AM    Narrative    Test Reason : I48.91,    Vent. Rate : 130 BPM     Atrial Rate : 133 BPM     P-R Int : 000 ms          QRS Dur : 086 ms      QT Int : 290 ms       P-R-T Axes : 000 009 247 degrees     QTc Int : 426 ms    Atrial fibrillation with rapid ventricular response  ST and T wave abnormality, consider inferolateral ischemia  Abnormal ECG  When compared with ECG of 30-MAY-2020 15:32,  Atrial fibrillation has replaced Sinus rhythm  Non-specific change in ST segment in Inferior leads  T wave inversion now evident in Inferior leads  Inverted T waves have replaced nonspecific T wave abnormality in   Anterior-lateral  leads    Referred By: [email or web address]   SELF           Confirmed By:          ASSESSMENT/PLAN:     Active Hospital Problems    Diagnosis    *Acute appendicitis with perforation, localized peritonitis, and abscess, without gangrene    Diaphoresis    KINJAL (acute kidney injury)    Type 2 diabetes mellitus without complication       Assessment & Plan:     1.  New onset AFib with RVR  2.  Sepsis  3.  Acute appendicitis with perforation status post open appendectomy and partial cecectomy  4.  KINJAL on CKD-improving  5.  Essential hypertension  6.  Carotid artery disease status post right carotid endarterectomy      Recommendations:    1.  Patient is borderline hypotensive at this time but also is in AFib with RVR from the low 100s to 130s at times.  2.  Will give digoxin 0.125 mg now.   3.  Patient is currently on Lovenox 40 mg subcu daily.  Would prefer him to go on Eliquis 5 mg p.o. b.i.d. when okay with surgery.  May also consider bridging with full dose 1 milligram/kilogram Lovenox b.i.d..  The  4.  Had a discussion with the patient and his wife regarding the diagnosis of atrial fibrillation.  Further education will be needed.  The patient sees Dr. Crisostomo is his primary cardiologist and will follow up with him on an outpatient basis once he is discharged.  5.  Will obtain a 2D echocardiogram.  6.  Will follow with you.  Thank you for the consultation.        [facility]  Department of Cardiology  Nishi Wright NP  Date of service: 06/02/2020

## 2020-06-02 NOTE — PLAN OF CARE
Problem: Physical Therapy Goal  Goal: Physical Therapy Goal  Description  Goals to be met by: discharge      Patient will increase functional independence with mobility by performin. Supine to sit with Modified [place]  2. Sit to supine with Modified [place]  3. Sit to stand transfer with Supervision  4. Bed to chair transfer with Supervision   5. Gait  x 200 feet with Supervision without AD     Outcome: Ongoing, Progressing   Pt continues to progress towards goals.

## 2020-06-02 NOTE — RESPIRATORY THERAPY
06/01/20 2118   Patient Assessment/Suction   Level of Consciousness (AVPU) alert   Respiratory Effort Normal;Unlabored   Expansion/Accessory Muscles/Retractions expansion symmetric;no retractions;no use of accessory muscles   All Lung Fields Breath Sounds diminished   Rhythm/Pattern, Respiratory no shortness of breath reported;pattern regular;unlabored   PRE-TX-O2   O2 Device (Oxygen Therapy) nasal cannula   Flow (L/min) 2   SpO2 (!) 93 %   Pulse Oximetry Type Intermittent   $ Pulse Oximetry - Multiple Charge Pulse Oximetry - Multiple   Pulse 90   Resp 20   Incentive Spirometer   $ Incentive Spirometer Charges done with encouragement;postop instruction;ready for self-administration;breath hold utilized;proper technique demonstrated   Incentive Spirometer Predicted Level (mL) 2800   Administration (IS) instruction provided, follow-up;proper technique demonstrated   Number of Repetitions (IS) 10   Level Incentive Spirometer (mL) 1250   Patient Tolerance (IS) good;no adverse signs/symptoms present   Respiratory Interventions   Cough And Deep Breathing done with encouragement   Breathing Techniques/Airway Clearance deep/controlled cough encouraged;diaphragmatic breathing promoted   Respiratory Evaluation   $ Care Plan Tech Time 15 min   Admitting Diagnosis Appendicitis   Home Oxygen   Has Home Oxygen? No   Home Aerosol, MDI, DPI, and Other Treatments/Therapies   Home Respiratory Therapy Per Patient/Review of Chart No   Atelectasis Care Plan   Atelectasis Care Plan Incentive Spiromentry   Frequency QID   I.S. Goal (ml) 2800 ml   I.S. Minimum (ml) 1250 ml   Rationale Post-op abdominal

## 2020-06-02 NOTE — PLAN OF CARE
06/02/20 1219   Discharge Assessment   Assessment Type Discharge Planning Assessment   Confirmed/corrected address and phone number on facesheet? Yes   Assessment information obtained from? Patient   Communicated expected length of stay with patient/caregiver no   Prior to hospitilization cognitive status: Alert/Oriented   Prior to hospitalization functional status: Independent   Current cognitive status: Alert/Oriented   Current Functional Status: Independent   Lives With spouse   Able to Return to Prior Arrangements yes   Is patient able to care for self after discharge? Yes   Who are your caregiver(s) and their phone number(s)? Lizz Ramos, spouse [phone]   Readmission Within the Last 30 Days no previous admission in last 30 days   Patient currently being followed by outpatient case management? No   Patient currently receives any other outside agency services? No   Equipment Currently Used at Home none   Do you have any problems affording any of your prescribed medications? No   Is the patient taking medications as prescribed? yes   Does the patient have transportation home? Yes   Transportation Anticipated family or friend will provide   Discharge Plan A Home with family   Discharge Plan B Home with family   DME Needed Upon Discharge  none   Patient/Family in Agreement with Plan yes

## 2020-06-02 NOTE — PROGRESS NOTES
[facility] Medicine  Progress Note    Patient name: Ruy Ramos Jr.  MRN: [id number]  Admit Date: 5/30/2020   LOS: 3 days     SUBJECTIVE:     Principal problem: Acute appendicitis with perforation, localized peritonitis, and abscess, without gangrene    Interval History:   Patient continues to be in atrial fibrillation with heart rates ranging from 90s to 150s.  Denies any chest pain, shortness of breath, palpitations.  Patient is frustrated and wants to go home.  Afebrile the past 24 hr on IV antibiotics.  Blood cultures grew gram-negative rods in both bottles.  Repeat blood cultures ordered.  Cardiology consulted new onset atrial fibrillation.  Patient is frustrated and wants to go home but is understanding.  Has not passed flatus or had a bowel movement. Denies N/V. Tolerated feeds.  Case discussed with wife was at bedside.    Scheduled Meds:   apixaban  5 mg Oral BID    digoxin  125 mcg Intravenous Once    fluconazole (DIFLUCAN) IVPB  100 mg Intravenous Q24H    metoprolol tartrate  12.5 mg Oral BID    mupirocin  1 g Nasal BID    pantoprazole  40 mg Oral Before breakfast    piperacillin-tazobactam (ZOSYN) IVPB  4.5 g Intravenous Q8H    telmisartan  40 mg Oral Daily     Continuous Infusions:  PRN Meds:acetaminophen, dextrose 50%, dextrose 50%, dextrose 50%, glucagon (human recombinant), glucagon (human recombinant), glucose, glucose, HYDROcodone-acetaminophen, HYDROmorphone, insulin aspart U-100, metoprolol, morphine, ondansetron, ondansetron, sodium chloride 0.9%, sodium chloride 0.9%    Review of patient's allergies indicates:  No Known Allergies    Review of Systems  As per subjective    OBJECTIVE:     Vital Signs (Most Recent)  Temp: 98.5 °F (36.9 °C) (06/02/20 0730)  Pulse: (!) 114 (06/02/20 0730)  Resp: 20 (06/02/20 0730)  BP: 118/84 (06/02/20 0730)  SpO2: 95 % (06/02/20 0730)    Vital Signs Range (Last 24H):  Temp:  [97.5 °F (36.4 °C)-99 °F (37.2 °C)]   Pulse:   []   Resp:  [15-20]   BP: ()/(71-84)   SpO2:  [90 %-96 %]     I & O (Last 24H):    Intake/Output Summary (Last 24 hours) at 6/2/2020 1451  Last data filed at 6/2/2020 0600  Gross per 24 hour   Intake 120 ml   Output 260 ml   Net -140 ml       Physical Exam:  General: Patient resting comfortably in no acute distress. Appears as stated age. Calm  Eyes: EOM intact. No conjunctivae injection. No scleral icterus.  ENT: Hearing grossly intact. No discharge from ears. No nasal discharge.   CVS: IRR. Tachycardic. No LE edema BL.  Lungs: CTA BL, no wheezing or crackles. Good breath sounds. No accessory muscle use. No acute respiratory distress  Ab: abdominal binder in place, s/p LESLIE drain  Neuro: AOx3. GCS 15. Cranial nerves grossly intact. Moves all extremities equally. Follows commands. Responds appropriately     Laboratory:  CBC:   Recent Labs   Lab 06/02/20  0429   WBC 12.79*   RBC 4.37*   HGB 12.7*   HCT 40.4      MCV 92   MCH 29.1   MCHC 31.4*     CMP:   Recent Labs   Lab 06/02/20  0429   *   CALCIUM 9.5   ALBUMIN 3.1*   PROT 7.2      K 3.9   CO2 21*      BUN 32*   CREATININE 1.7*   ALKPHOS 68   ALT 15   AST 18   BILITOT 0.7       Diagnostic Results:      ASSESSMENT/PLAN:     Active Hospital Problems    Diagnosis    *Acute appendicitis with perforation, localized peritonitis, and abscess, without gangrene    Diaphoresis    KINJAL (acute kidney injury)    Type 2 diabetes mellitus without complication         Plan:   Acute Appendicitis with perforation S/P open appendectomy with partial cecectomy on 5/31/2020  Found large perforation at the base of the appendix, fecal contamination of the abdomen and abscess pelvis  Cont IV Zosyn 4.5 gms q 8* and IV Diflucan 100mg  q 24* as pt had significant perforation and contamination  Dr Gil on board, appreciate it  Pain management  Incentive spirometry Q hourly  Consulted PT, OOB, Ambulation    New onset Atrial fibrillation  New onset after  surgery  CHADSVASc 4.  Plan to start anticoagulation when okay with surgery  On metoprolol b.i.d.  Cardiology consulted  TSH ordered  Echo pending     KINJAL on CKD 3  most likely secondary to dehydration and infection  Continue IV hydration for now  Baseline Cr 1.6  Hold all nephrotoxic Med.     Essential HTN   Hypotensive on admission  Resume Telmisartan 40 mg po daily, will introduce amlodipine if still elevated      NIDDM   Low dose Novolog s/s insulin  S/S protocol     Unchanged 8 mm nodule at the left costophrenic sulcus  (seen on CT today)  Referral out pt  Send copies to PCP      VTE Risk Mitigation (From admission, onward)         Ordered     apixaban tablet 5 mg  2 times daily      06/02/20 1433     IP VTE LOW RISK PATIENT  Once      05/30/20 1916     Place sequential compression device  Until discontinued      05/30/20 1916                  Department Hospital Medicine  [facility]  Rigoberto Gonzalez MD  Date of service: 06/02/2020

## 2020-06-02 NOTE — RESPIRATORY THERAPY
06/02/20 0233   Patient Assessment/Suction   Level of Consciousness (AVPU) alert   Respiratory Effort Unlabored   Expansion/Accessory Muscles/Retractions expansion symmetric;no retractions;no use of accessory muscles   All Lung Fields Breath Sounds diminished   Rhythm/Pattern, Respiratory no shortness of breath reported;pattern regular;unlabored   PRE-TX-O2   O2 Device (Oxygen Therapy) Oxymask   Flow (L/min) 5   SpO2 95 %   Pulse Oximetry Type Intermittent   $ Pulse Oximetry - Multiple Charge Pulse Oximetry - Multiple   Pulse (!) 120   Resp 20   Respiratory Interventions   Cough And Deep Breathing done with encouragement   Breathing Techniques/Airway Clearance deep/controlled cough encouraged;diaphragmatic breathing promoted   Respiratory Evaluation   $ Care Plan Tech Time 15 min   Nurse had called, pt Sats dropping. Pt is having Afib. 3lpm oxymask is on pt. Encouraged deep diaphragmatic breathing exercises. Increased O2 to 5lpm.

## 2020-06-03 VITALS
TEMPERATURE: 100 F | BODY MASS INDEX: 25.11 KG/M2 | DIASTOLIC BLOOD PRESSURE: 74 MMHG | RESPIRATION RATE: 18 BRPM | HEART RATE: 101 BPM | OXYGEN SATURATION: 95 % | HEIGHT: 69 IN | WEIGHT: 169.56 LBS | SYSTOLIC BLOOD PRESSURE: 105 MMHG

## 2020-06-03 PROBLEM — I48.91 ATRIAL FIBRILLATION: Status: ACTIVE | Noted: 2020-06-03

## 2020-06-03 LAB
ALBUMIN SERPL BCP-MCNC: 2.8 G/DL (ref 3.5–5.2)
ALP SERPL-CCNC: 60 U/L (ref 55–135)
ALT SERPL W/O P-5'-P-CCNC: 14 U/L (ref 10–44)
ANION GAP SERPL CALC-SCNC: 12 MMOL/L (ref 8–16)
AST SERPL-CCNC: 13 U/L (ref 10–40)
BASOPHILS # BLD AUTO: 0.04 K/UL (ref 0–0.2)
BASOPHILS NFR BLD: 0.4 % (ref 0–1.9)
BILIRUB SERPL-MCNC: 0.7 MG/DL (ref 0.1–1)
BUN SERPL-MCNC: 33 MG/DL (ref 8–23)
CALCIUM SERPL-MCNC: 8.9 MG/DL (ref 8.7–10.5)
CHLORIDE SERPL-SCNC: 106 MMOL/L (ref 95–110)
CO2 SERPL-SCNC: 19 MMOL/L (ref 23–29)
CREAT SERPL-MCNC: 1.8 MG/DL (ref 0.5–1.4)
DIFFERENTIAL METHOD: ABNORMAL
EOSINOPHIL # BLD AUTO: 0.1 K/UL (ref 0–0.5)
EOSINOPHIL NFR BLD: 0.8 % (ref 0–8)
ERYTHROCYTE [DISTWIDTH] IN BLOOD BY AUTOMATED COUNT: 14.9 % (ref 11.5–14.5)
EST. GFR  (AFRICAN AMERICAN): 41.1 ML/MIN/1.73 M^2
EST. GFR  (NON AFRICAN AMERICAN): 35.5 ML/MIN/1.73 M^2
GLUCOSE SERPL-MCNC: 152 MG/DL (ref 70–110)
GLUCOSE SERPL-MCNC: 176 MG/DL (ref 70–110)
GLUCOSE SERPL-MCNC: 179 MG/DL (ref 70–110)
GLUCOSE SERPL-MCNC: 182 MG/DL (ref 70–110)
HCT VFR BLD AUTO: 34.5 % (ref 40–54)
HGB BLD-MCNC: 11.5 G/DL (ref 14–18)
IMM GRANULOCYTES # BLD AUTO: 0.07 K/UL (ref 0–0.04)
IMM GRANULOCYTES NFR BLD AUTO: 0.7 % (ref 0–0.5)
LYMPHOCYTES # BLD AUTO: 1 K/UL (ref 1–4.8)
LYMPHOCYTES NFR BLD: 8.8 % (ref 18–48)
MAGNESIUM SERPL-MCNC: 2.2 MG/DL (ref 1.6–2.6)
MCH RBC QN AUTO: 29.6 PG (ref 27–31)
MCHC RBC AUTO-ENTMCNC: 33.3 G/DL (ref 32–36)
MCV RBC AUTO: 89 FL (ref 82–98)
MONOCYTES # BLD AUTO: 1 K/UL (ref 0.3–1)
MONOCYTES NFR BLD: 9.4 % (ref 4–15)
NEUTROPHILS # BLD AUTO: 8.6 K/UL (ref 1.8–7.7)
NEUTROPHILS NFR BLD: 79.9 % (ref 38–73)
NRBC BLD-RTO: 0 /100 WBC
PHOSPHATE SERPL-MCNC: 4.1 MG/DL (ref 2.7–4.5)
PLATELET # BLD AUTO: 213 K/UL (ref 150–350)
PMV BLD AUTO: 10.7 FL (ref 9.2–12.9)
POTASSIUM SERPL-SCNC: 3.6 MMOL/L (ref 3.5–5.1)
PROT SERPL-MCNC: 6.4 G/DL (ref 6–8.4)
RBC # BLD AUTO: 3.89 M/UL (ref 4.6–6.2)
SODIUM SERPL-SCNC: 137 MMOL/L (ref 136–145)
T4 FREE SERPL-MCNC: 0.78 NG/DL (ref 0.71–1.51)
TSH SERPL DL<=0.005 MIU/L-ACNC: 1.31 UIU/ML (ref 0.34–5.6)
WBC # BLD AUTO: 10.76 K/UL (ref 3.9–12.7)

## 2020-06-03 PROCEDURE — 25000003 PHARM REV CODE 250: Performed by: INTERNAL MEDICINE

## 2020-06-03 PROCEDURE — 97116 GAIT TRAINING THERAPY: CPT | Mod: CQ

## 2020-06-03 PROCEDURE — 85025 COMPLETE CBC W/AUTO DIFF WBC: CPT

## 2020-06-03 PROCEDURE — 83735 ASSAY OF MAGNESIUM: CPT

## 2020-06-03 PROCEDURE — 80053 COMPREHEN METABOLIC PANEL: CPT

## 2020-06-03 PROCEDURE — 63600175 PHARM REV CODE 636 W HCPCS: Performed by: INTERNAL MEDICINE

## 2020-06-03 PROCEDURE — 84443 ASSAY THYROID STIM HORMONE: CPT

## 2020-06-03 PROCEDURE — 84439 ASSAY OF FREE THYROXINE: CPT

## 2020-06-03 PROCEDURE — 36415 COLL VENOUS BLD VENIPUNCTURE: CPT

## 2020-06-03 PROCEDURE — 94761 N-INVAS EAR/PLS OXIMETRY MLT: CPT

## 2020-06-03 PROCEDURE — 84100 ASSAY OF PHOSPHORUS: CPT

## 2020-06-03 PROCEDURE — 25000003 PHARM REV CODE 250: Performed by: NURSE PRACTITIONER

## 2020-06-03 PROCEDURE — 99900035 HC TECH TIME PER 15 MIN (STAT)

## 2020-06-03 PROCEDURE — 82962 GLUCOSE BLOOD TEST: CPT

## 2020-06-03 RX ORDER — METOPROLOL TARTRATE 25 MG/1
25 TABLET, FILM COATED ORAL 2 TIMES DAILY
Qty: 60 TABLET | Refills: 0 | Status: SHIPPED | OUTPATIENT
Start: 2020-06-03 | End: 2021-06-29 | Stop reason: ALTCHOICE

## 2020-06-03 RX ORDER — AMOXICILLIN AND CLAVULANATE POTASSIUM 875; 125 MG/1; MG/1
1 TABLET, FILM COATED ORAL 2 TIMES DAILY
Qty: 14 TABLET | Refills: 0 | Status: SHIPPED | OUTPATIENT
Start: 2020-06-03 | End: 2020-06-11

## 2020-06-03 RX ORDER — FLUCONAZOLE 200 MG/1
400 TABLET ORAL DAILY
Qty: 14 TABLET | Refills: 0 | Status: CANCELLED | OUTPATIENT
Start: 2020-06-03 | End: 2020-06-10

## 2020-06-03 RX ADMIN — APIXABAN 5 MG: 5 TABLET, FILM COATED ORAL at 08:06

## 2020-06-03 RX ADMIN — MUPIROCIN 1 G: 20 OINTMENT TOPICAL at 09:06

## 2020-06-03 RX ADMIN — PANTOPRAZOLE SODIUM 40 MG: 40 TABLET, DELAYED RELEASE ORAL at 05:06

## 2020-06-03 RX ADMIN — PIPERACILLIN SODIUM AND TAZOBACTAM SODIUM 4.5 G: 4; .5 INJECTION, POWDER, LYOPHILIZED, FOR SOLUTION INTRAVENOUS at 10:06

## 2020-06-03 RX ADMIN — FLUCONAZOLE 100 MG: 2 INJECTION, SOLUTION INTRAVENOUS at 01:06

## 2020-06-03 RX ADMIN — METOPROLOL TARTRATE 12.5 MG: 25 TABLET, FILM COATED ORAL at 08:06

## 2020-06-03 RX ADMIN — PIPERACILLIN SODIUM AND TAZOBACTAM SODIUM 4.5 G: 4; .5 INJECTION, POWDER, LYOPHILIZED, FOR SOLUTION INTRAVENOUS at 02:06

## 2020-06-03 NOTE — PT/OT/SLP PROGRESS
Physical Therapy Treatment    Patient Name:  Ruy Ramos Jr.   MRN:  [id number]    Recommendations:     Discharge Recommendations:  home   Discharge Equipment Recommendations: none   Barriers to discharge: None    Assessment:     Ruy Ramos Jr. is a 75 y.o. male admitted with a medical diagnosis of Acute appendicitis with perforation, localized peritonitis, and abscess, without gangrene.  He presents with the following impairments/functional limitations:  weakness, impaired functional mobilty, decreased safety awareness, impaired cardiopulmonary response to activity, decreased coordination, gait instability, impaired endurance, pain, impaired balance, impaired self care skills. Patient presents ambulating into room from bathroom; agreeable to PT treatment. Patient progressing with gait and mobility, meeting functional mobility goals. No LOB or SOB noted. Patient on RA. Discussed with PT and patient has no further skilled therapy needs.    Rehab Prognosis: Good; patient would benefit from acute skilled PT services to address these deficits and reach maximum level of function.    Recent Surgery: Procedure(s) (LRB):  APPENDECTOMY, LAPAROSCOPIC VS OPEN (N/A)  APPENDECTOMY 4 Days Post-Op    Plan:     During this hospitalization, patient to be seen 6 x/week to address the identified rehab impairments via gait training, therapeutic activities, therapeutic exercises and progress toward the following goals:    · Plan of Care Expires:  07/01/20    Subjective     Chief Complaint: No complaints  Patient/Family Comments/goals:   Pain/Comfort:  · Pain Rating 1: 0/10      Objective:     Communicated with RN prior to session.  Patient found ambulating from bathroom with telemetry, LESLIE drain upon PT entry to room.     General Precautions: Standard, fall   Orthopedic Precautions:    Braces:       Functional Mobility:  · Transfers:     · Sit to Stand:  supervision with no AD  · Gait: 200ft with no AD and SBA      AM-PAC  6 CLICK MOBILITY          Therapeutic Activities and Exercises:  sit <> stands; transfer training    Patient left sitting EOB with all lines intact, call button in reach and pt's wife present..    GOALS:   Multidisciplinary Problems     Physical Therapy Goals     Not on file          Multidisciplinary Problems (Resolved)        Problem: Physical Therapy Goal    Goal Priority Disciplines Outcome Goal Variances Interventions   Physical Therapy Goal   (Resolved)     PT, PT/OT Met     Description:  Goals to be met by: discharge      Patient will increase functional independence with mobility by performin. Supine to sit with Modified [place]  2. Sit to supine with Modified [place]  3. Sit to stand transfer with Supervision  4. Bed to chair transfer with Supervision   5. Gait  x 200 feet with Supervision without AD                      Time Tracking:     PT Received On: 20  PT Start Time: 1408     PT Stop Time: 1416  PT Total Time (min): 8 min     Billable Minutes: Gait Training 8    Treatment Type: Treatment  PT/PTA: PTA     PTA Visit Number: 2     Ifeoma Fleming PTA  2020

## 2020-06-03 NOTE — PROGRESS NOTES
[facility]  Department of Cardiology  Progress Note    Patient name: Ruy Ramos Jr.  MRN: [id number]  Today's Date: 06/03/2020  Admit Date: 5/30/2020    SUBJECTIVE     Principal Problem: Acute appendicitis with perforation, localized peritonitis, and abscess, without gangrene    Interval History:  Patient up in room walking and shaving upon arrival. He states he is feeling pretty good and is eager to go home.       Review of patient's allergies indicates:  No Known Allergies    REVIEW OF SYSTEMS  Constitutional: Negative for chills, fatigue and fever.   Eyes: No double vision, No blurred vision  Neuro: No headaches, No dizziness  Respiratory: Negative for cough, shortness of breath and wheezing.    Cardiovascular: Negative for chest pain. Negative for palpitations and leg swelling.   Gastrointestinal: Negative for abdominal pain, No melena, diarrhea, nausea and vomiting.   Genitourinary: Negative for dysuria and frequency, Negative for hematuria  Skin: Negative for bruising, Negative for edema or discoloration noted.   Endocrine: Negative for polyphagia, Negative for heat intolerance, Negative for cold intolerance  Psychiatric:  Positive for feeling agitated and restless, ready to go  Musculoskeletal: Negative for neck pain, Negative for muscle weakness, Negative for back pain     OBJECTIVE     Vital Signs (Most Recent)  Temp: 98.3 °F (36.8 °C) (06/03/20 0745)  Pulse: 99 (06/03/20 0828)  Resp: 18 (06/03/20 0745)  BP: 125/68 (06/03/20 0828)  SpO2: (!) 93 % (06/03/20 0828)    I & O (Last 24H):    Intake/Output Summary (Last 24 hours) at 6/3/2020 1125  Last data filed at 6/3/2020 0820  Gross per 24 hour   Intake 150 ml   Output 757 ml   Net -607 ml       WEIGHTS LAST 4 READINGS  Wt Readings from Last 4 Encounters:   06/03/20 76.9 kg (169 lb 8.5 oz)   02/07/20 78 kg (171 lb 15.3 oz)   09/21/15 80.5 kg (177 lb 8 oz)   09/11/15 81.7 kg (180 lb 1.9 oz)       PHYSICAL EXAM  GENERAL: well built, well  nourished, well-developed in no apparent distress alert and oriented.   HEENT: Normocephalic. Pupils normal and conjunctivae normal.  Mucous membranes normal, no cyanosis or icterus, trachea central,no pallor or icterus is noted..   NECK: No JVD. No bruit..   CARDIAC:  Irregular rate and rhythm.  S1 is normal.S2 is normal.  CHEST ANATOMY: normal.   LUNGS: Clear to auscultation. No wheezing or rhonchi..    ABDOMEN:  Abdominal binder in place, LESLIE drain  URINARY: No wan catheter   EXTREMITIES: No cyanosis, clubbing or edema noted at this time., no calf tenderness bilaterally.   PERIPHERAL VASCULAR SYSTEM: Good palpable distal pulses.   CENTRAL NERVOUS SYSTEM: No focal motor or sensory deficits noted.   SKIN: Skin without lesions, moist, well perfused.   MUSCLE STRENGTH & TONE: No noteable weakness, atrophy or abnormal movement.     Scheduled Meds:   apixaban  5 mg Oral BID    fluconazole (DIFLUCAN) IVPB  100 mg Intravenous Q24H    metoprolol tartrate  12.5 mg Oral BID    mupirocin  1 g Nasal BID    pantoprazole  40 mg Oral Before breakfast    piperacillin-tazobactam (ZOSYN) IVPB  4.5 g Intravenous Q8H    telmisartan  40 mg Oral Daily       Continuous Infusions:    PRN Meds:acetaminophen, dextrose 50%, dextrose 50%, dextrose 50%, glucagon (human recombinant), glucagon (human recombinant), glucose, glucose, HYDROcodone-acetaminophen, HYDROmorphone, insulin aspart U-100, metoprolol, morphine, ondansetron, ondansetron, sodium chloride 0.9%, sodium chloride 0.9%    LABS AND DIAGNOSTICS     CBC LAST 3 DAYS  Recent Labs   Lab 05/30/20  1543  06/01/20  0412 06/02/20  0429 06/03/20  0346   WBC 12.22   < > 9.69 12.79* 10.76   RBC 4.58*   < > 4.10* 4.37* 3.89*   HGB 13.5*   < > 12.0* 12.7* 11.5*   HCT 41.4   < > 36.4* 40.4 34.5*   MCV 90   < > 89 92 89   MCH 29.5   < > 29.3 29.1 29.6   MCHC 32.6   < > 33.0 31.4* 33.3   RDW 15.3*   < > 15.3* 15.3* 14.9*      < > 183 213 213   MPV 11.1   < > 10.5 11.2 10.7   GRAN  83.7*  10.2*   < > 88.0* 87.9*  11.2* 79.9*  8.6*   LYMPH 7.6*  0.9*   < > 10.0* 4.4*  0.6* 8.8*  1.0   MONO 7.9  1.0   < > 2.0* 6.8  0.9 9.4  1.0   BASO 0.03  --   --  0.02 0.04   NRBC 0   < > 0 0 0    < > = values in this interval not displayed.       COAGULATION LAST 3 DAYS  No results for input(s): LABPT, INR, APTT in the last 168 hours.    CHEMISTRY LAST 3 DAYS  Recent Labs   Lab 06/01/20  0412 06/02/20  0429 06/03/20  0346    136 137   K 4.2 3.9 3.6    103 106   CO2 19* 21* 19*   ANIONGAP 12 12 12   BUN 32* 32* 33*   CREATININE 1.9* 1.7* 1.8*   * 189* 152*   CALCIUM 9.1 9.5 8.9   MG 2.1 2.3 2.2   ALBUMIN 3.2* 3.1* 2.8*   PROT 7.1 7.2 6.4   ALKPHOS 52* 68 60   ALT 16 15 14   AST 16 18 13   BILITOT 0.8 0.7 0.7       CARDIAC PROFILE LAST 3 DAYS  Recent Labs   Lab 05/30/20  1946   BNP 36       ENDOCRINE LAST 3 DAYS  Recent Labs   Lab 06/03/20  0346   TSH 1.310       LAST ARTERIAL BLOOD GAS  ABG  No results for input(s): PH, PO2, PCO2, HCO3, BE in the last 168 hours.    LAST 7 DAYS MICROBIOLOGY   Microbiology Results (last 7 days)     Procedure Component Value Units Date/Time    Blood culture [[id number]] Collected:  06/02/20 1205    Order Status:  Completed Specimen:  Blood Updated:  06/02/20 1917     Blood Culture, Routine No Growth to date    Blood Culture #1 **CANNOT BE ORDERED STAT** [[id number]] Collected:  05/30/20 1715    Order Status:  Completed Specimen:  Blood from Peripheral, Antecubital, Left Updated:  06/02/20 1116     Blood Culture, Routine Gram stain michel bottle: Gram negative rods      Results called to and read back by:Magui Santiago RN A-CARD  06/02/2020        11:15 JBM    Blood Culture #2 **CANNOT BE ORDERED STAT** [[id number]] Collected:  05/30/20 1730    Order Status:  Completed Specimen:  Blood from Peripheral, Antecubital, Left Updated:  06/02/20 1116     Blood Culture, Routine Gram stain michel bottle: Gram negative rods      Results called to and read back by:Magui  Pamela HAMILTON A-CARD  06/02/2020        11:15 JBM          MOST RECENT IMAGING  Echo Color Flow Doppler? Yes  · Concentric left ventricular remodeling.  · Small left ventricular cavity size.  · Hyperdynamic left ventricular systolic function. The estimated ejection   fraction is 75%.  · Normal right ventricular systolic function.  · Mild to moderate tricuspid regurgitation.  · Moderate mitral regurgitation.  · The aortic root is mildly dilated.         LASTECHO  Results for orders placed during the hospital encounter of 05/30/20   Echo Color Flow Doppler? Yes    Narrative · Concentric left ventricular remodeling.  · Small left ventricular cavity size.  · Hyperdynamic left ventricular systolic function. The estimated ejection   fraction is 75%.  · Normal right ventricular systolic function.  · Mild to moderate tricuspid regurgitation.  · Moderate mitral regurgitation.  · The aortic root is mildly dilated.          CURRENT/PREVIOUS VISIT EKG  Results for orders placed or performed during the hospital encounter of 05/30/20   EKG 12-lead    Collection Time: 06/02/20  2:28 AM    Narrative    Test Reason : I48.91,    Vent. Rate : 130 BPM     Atrial Rate : 133 BPM     P-R Int : 000 ms          QRS Dur : 086 ms      QT Int : 290 ms       P-R-T Axes : 000 009 247 degrees     QTc Int : 426 ms    Atrial fibrillation with rapid ventricular response  ST and T wave abnormality, consider inferolateral ischemia  Abnormal ECG  When compared with ECG of 30-MAY-2020 15:32,  Atrial fibrillation has replaced Sinus rhythm  Non-specific change in ST segment in Inferior leads  T wave inversion now evident in Inferior leads  Inverted T waves have replaced nonspecific T wave abnormality in   Anterior-lateral leads  Confirmed by Kaleb Ulrich MD ([id number]) on 6/2/2020 7:54:51 PM    Referred By: [email or web address]   SELF           Confirmed By:Kaleb Ulrich MD         ASSESSMENT/PLAN:     Active Hospital Problems    Diagnosis    *Acute appendicitis with  perforation, localized peritonitis, and abscess, without gangrene    Diaphoresis    KINJAL (acute kidney injury)    Type 2 diabetes mellitus without complication       Assessment & Plan:     1.  New onset AFib with RVR  2.  Sepsis  3.  Acute appendicitis with perforation status post open appendectomy and partial cecectomy  4.  KINJAL on CKD-improving  5.  Essential hypertension  6.  Carotid artery disease status post right carotid endarterectomy      Recommendations:    1. Patient remains in Afib with HR in the 80-90s and up to 120's on exertion. Discussed case with the hospitalist.   Would consider increasing metoprolol for rate control, and continue Eliquis 5 mg po BID.   2. The patient is very anxious to go home. Would recommend close follow up with Dr. Crisostomo for further treatment of Afib.   3. Will follow with patient while he is admitted. Thank you.         [facility]  Department of Cardiology  Nishi Wright NP  Date of service: 06/03/2020

## 2020-06-03 NOTE — DISCHARGE SUMMARY
[facility]  Discharge Summary      Patient Name: Ruy Ramos Jr.  MRN: [id number]  Admission Date: 5/30/2020  Hospital Length of Stay: 4 days  Discharge Date and Time: 6/3/2020  5:11 PM  Attending Physician: No att. providers found   Discharging Provider: Rigoberto Gonzalez MD  Primary Care Provider: Kanu Medeiros MD      HPI:   75-year-old  male presents emergency room with fever and abdominal pain.       The patient states for the last 2-3 days he has been having fever, chills and right lower quadrant abdominal pain.  He was also having right chest rib pain.  The patient describes his pain as a sharp pressure sensation that worsens with palpation and movement.       The patient states he had been taking Tylenol and ibuprofen without improvement in his symptoms.       The patient states today he had a temperature of a 102° and his right lower quadrant abdominal pain got progressively and intensely worse.  This concerned him so he came to the emergency room for further evaluation.       Upon arrival in emergency room the patient was found to be hypotensive febrile and tachycardic.  He was given IV fluid boluses and diagnostic imaging was ordered.       A CT of the abdomen and pelvis did reveal an acute appendicitis with microperforations.  Surgery was consulted by the ER physician and the plan is for emergent appendectomy.       The patient also had acute kidney injury most likely sepsis secondary to infection and nephrotoxin medications complicated by dehydration.  IV antibiotics were started in the emergency room       COVID-19 test was negative    Procedure(s) (LRB):  APPENDECTOMY, LAPAROSCOPIC VS OPEN (N/A)  APPENDECTOMY      Hospital Course:   75-year-old white male with known past medical history of diabetes, hypertension, kidney stone admitted 05/30/2020 with a diagnosis of acute appendicitis with perforation.  Patient underwent open appendectomy with partial  seek ectomy due to large perforation at the base of the appendix, fecal contamination of the abdomen and abscess in the pelvis with Dr. Gil.  Patient started on IV Zosyn 4.5 q.8 hours and IV Diflucan 100 mg Q 24 hr.  Next morning patient was started on clear liquids and tolerated well, was advanced to full liquids  Encourage incentive spirometry.  Consulted PT, out of bed and ambulation.  Pain controlled well.  For KINJAL, patient is receiving IV hydration, BUN creatinine slowly improving  Patient was hypotensive on admission, blood pressure medications are on hold  Telmisartan 40 mg resumed as blood pressure is rising  Patient ambulated, tolerating full liquids, advance as tolerated. LESLIE drain in place  Patient then developed atrial fibrillation with RVR during hospitalization.  Patient was transferred to cardiac unit and started on metoprolol.  Heart rate improved but patient continues to be in AFib with rate control.  Cardiology consulted.  Case discussed with patient's cardiologist, Dr. Crisostomo, who recommended rate control and will follow-up with Dr. Crisostomo as an outpatient for possible cardioversion.  Patient tolerated diet.  Patient was stable discharge to home with instructions to follow-up with general surgery, Cardiology.    General: Patient resting comfortably in no acute distress. Appears as stated age. Calm  Eyes: EOM intact. No conjunctivae injection. No scleral icterus.  ENT: Hearing grossly intact. No discharge from ears. No nasal discharge.   CVS: IRR. No LE edema BL.  Lungs: CTA BL, no wheezing or crackles. Good breath sounds. No accessory muscle use. No acute respiratory distress  Neuro: AOx3. GCS 15. Cranial nerves grossly intact. Moves all extremities equally. Follows commands. Responds appropriately     Case discussed with Cardiology, General surgery prior to discharge.     Consults:   Consults (From admission, onward)        Status Ordering Provider     Inpatient consult to Cardiology  Once      Provider:  Kaleb Ulrich MD    Completed CAYLA MCRAE     Inpatient consult to Internal Medicine  Once     Provider:  Denise Serrano NP    Acknowledged JR JAMISON     Inpatient consult to Social Work/Case Management  Once     Provider:  (Not yet assigned)    Completed JR JAMISON          No new Assessment & Plan notes have been filed under this hospital service since the last note was generated.  Service: Hospital Medicine    Final Active Diagnoses:    Diagnosis Date Noted POA    PRINCIPAL PROBLEM:  Acute appendicitis with perforation, localized peritonitis, and abscess, without gangrene [K35.33] 05/30/2020 Yes    Atrial fibrillation [I48.91] 06/03/2020 No    Diaphoresis [R61] 06/02/2020 Yes    KINJAL (acute kidney injury) [N17.9] 05/30/2020 Yes    Type 2 diabetes mellitus without complication [E11.9] 09/11/2015 Yes      Problems Resolved During this Admission:    Diagnosis Date Noted Date Resolved POA    Severe sepsis [A41.9, R65.20] 05/30/2020 06/01/2020 Yes    Acute appendicitis [K35.80] 05/30/2020 06/01/2020 Yes       Discharged Condition: fair    Disposition: Home or Self Care    Follow Up:  Follow-up Information     Eusebio Gil III, MD In 1 week.    Specialties:  General Surgery, Surgery  Why:  For check up s/p hospital discharge  Contact information:  [address]  [address]  [facility] [address]  [phone]             Gunner Crisostomo Jr, MD In 1 week.    Specialty:  Cardiology  Why:  For check up s/p hospital discharge, Afib  Contact information:  [address]  [facility] [address]  [phone]             [facility].    Specialty:  Emergency Medicine  Why:  As needed  Contact information:  [address]  [facility] [address] [phone]  Additional information:  1st floor           Kanu Medeiros MD.    Specialty:  Family Medicine  Why:  As needed  Contact information:  [address] [facility] [place] [address]  [phone]             Jonatan Uribe MD On  6/10/2020.    Specialty:  Pulmonary Disease  Why:  2:00 pm  Contact information:  [address] [facility]  [address]  [facility] [address]  [phone]                 Patient Instructions:      Diet Cardiac     Notify your health care provider if you experience any of the following:  temperature >100.4     Notify your health care provider if you experience any of the following:  persistent nausea and vomiting or diarrhea     Notify your health care provider if you experience any of the following:  severe uncontrolled pain     Notify your health care provider if you experience any of the following:  redness, tenderness, or signs of infection (pain, swelling, redness, odor or green/yellow discharge around incision site)     Notify your health care provider if you experience any of the following:  difficulty breathing or increased cough     Notify your health care provider if you experience any of the following:  severe persistent headache     Notify your health care provider if you experience any of the following:  worsening rash     Notify your health care provider if you experience any of the following:  persistent dizziness, light-headedness, or visual disturbances     Notify your health care provider if you experience any of the following:  increased confusion or weakness     Activity as tolerated       Significant Diagnostic Studies: Labs:   CMP   Recent Labs   Lab 06/02/20  0429 06/03/20  0346    137   K 3.9 3.6    106   CO2 21* 19*   * 152*   BUN 32* 33*   CREATININE 1.7* 1.8*   CALCIUM 9.5 8.9   PROT 7.2 6.4   ALBUMIN 3.1* 2.8*   BILITOT 0.7 0.7   ALKPHOS 68 60   AST 18 13   ALT 15 14   ANIONGAP 12 12   ESTGFRAFRICA 44.0* 41.1*   EGFRNONAA 38.1* 35.5*    and CBC   Recent Labs   Lab 06/02/20  0429 06/03/20  0346   WBC 12.79* 10.76   HGB 12.7* 11.5*   HCT 40.4 34.5*    213       Pending Diagnostic Studies:     None         Medications:  Reconciled Home Medications:      Medication List      START  taking these medications    amoxicillin-clavulanate 875-125mg 875-125 mg per tablet  Commonly known as:  AUGMENTIN  Take 1 tablet by mouth 2 (two) times daily. for 7 days     apixaban 5 mg Tab  Commonly known as:  ELIQUIS  Take 1 tablet (5 mg total) by mouth 2 (two) times daily.     metoprolol tartrate 25 MG tablet  Commonly known as:  LOPRESSOR  Take 1 tablet (25 mg total) by mouth 2 (two) times daily.        CONTINUE taking these medications    ACCU-CHEK AUDREY PLUS METER Misc  Generic drug:  blood-glucose meter     ACCU-CHEK AUDREY PLUS TEST STRP Strp  Generic drug:  blood sugar diagnostic     ACCU-CHEK SOFTCLIX LANCETS Misc  Generic drug:  lancets     aspirin 81 MG EC tablet  Commonly known as:  ECOTRIN  Take 81 mg by mouth once daily.     DIGESTIVE HEALTH PROBIOTIC ORAL  Take 1 tablet by mouth once daily.     ezetimibe 10 mg tablet  Commonly known as:  ZETIA  Take 10 mg by mouth once daily.     FOLPLEX 2.2 ORAL  Take 1 tablet by mouth once daily.     gemfibroziL 600 MG tablet  Commonly known as:  LOPID  Take 600 mg by mouth 2 (two) times daily before meals.     INVOKANA 300 mg Tab tablet  Generic drug:  canagliflozin  Take 300 mg by mouth once daily.     MULTIVITAMIN ORAL  Take 1 tablet by mouth once daily.     OMEGA 3 FISH -1,400 mg Cpdr  Generic drug:  omega 3-dha-epa-fish oil  Take 2 capsules by mouth 2 (two) times daily.     pioglitazone 15 MG tablet  Commonly known as:  ACTOS  Take 15 mg by mouth once daily.     telmisartan 40 MG Tab  Commonly known as:  MICARDIS  Take 40 mg by mouth once daily.     vitamin D 1000 units Tab  Commonly known as:  VITAMIN D3  Take 1,000 Units by mouth once daily.        STOP taking these medications    amLODIPine 5 MG tablet  Commonly known as:  NORVASC            Indwelling Lines/Drains at time of discharge:   Lines/Drains/Airways     Drain                 Closed/Suction Drain 05/30/20 8434 Left Abdomen Bulb 10 Fr. 3 days                Time spent on the discharge of  patient: 40 minutes  Patient was seen and examined on the date of discharge and determined to be suitable for discharge.         Rigoberto Gonzalez MD  Department of Hospital Medicine  [facility]  Date of service: 06/03/2020

## 2020-06-03 NOTE — NURSING
Pt tolerating Full liquid diet, + BS and gas. Pt had moderate size loose BM. Still in Afib rate controlled and BP stable. Will continue to monitor.

## 2020-06-03 NOTE — PLAN OF CARE
06/03/20 1102   Discharge Assessment   Assessment Type Discharge Planning Reassessment     Received consult for referral to Dr. Uribe in Pulmology and records (all CT scans) of lung mass to be sent to Dr. Uribe and patient's PCP, Dr. Medeiros. Authorization of PHI release signed by patient. Completed form and faxed to medical records. Notified Dr. Gonzalez that patient will need referral for Dr. Uribe and he is aware.

## 2020-06-03 NOTE — NURSING
Pt given thorough discharge instructions regarding post op care, meds, and when to follow up . States understanding. NADN< VSS. All belongings with pt at time of discharge.

## 2020-06-03 NOTE — PLAN OF CARE
Problem: Physical Therapy Goal  Goal: Physical Therapy Goal  Description  Goals to be met by: discharge      Patient will increase functional independence with mobility by performin. Supine to sit with Modified [place]  2. Sit to supine with Modified [place]  3. Sit to stand transfer with Supervision  4. Bed to chair transfer with Supervision   5. Gait  x 200 feet with Supervision without AD     Outcome: Met   Patient has met goals.

## 2020-06-03 NOTE — PROGRESS NOTES
[facility]  General Surgery  Progress Note    Subjective:     Interval History: Feeling better. No passing flatus and had BM. Afebrile.     Post-Op Info:  Procedure(s) (LRB):  APPENDECTOMY, LAPAROSCOPIC VS OPEN (N/A)  APPENDECTOMY   4 Days Post-Op      Medications:  Continuous Infusions:  Scheduled Meds:   apixaban  5 mg Oral BID    fluconazole (DIFLUCAN) IVPB  100 mg Intravenous Q24H    metoprolol tartrate  12.5 mg Oral BID    mupirocin  1 g Nasal BID    pantoprazole  40 mg Oral Before breakfast    piperacillin-tazobactam (ZOSYN) IVPB  4.5 g Intravenous Q8H    telmisartan  40 mg Oral Daily     PRN Meds:acetaminophen, dextrose 50%, dextrose 50%, dextrose 50%, glucagon (human recombinant), glucagon (human recombinant), glucose, glucose, HYDROcodone-acetaminophen, HYDROmorphone, insulin aspart U-100, metoprolol, morphine, ondansetron, ondansetron, sodium chloride 0.9%, sodium chloride 0.9%     Objective:     Vital Signs (Most Recent):  Temp: 97.9 °F (36.6 °C) (06/03/20 1125)  Pulse: 98 (06/03/20 1125)  Resp: 18 (06/03/20 1125)  BP: 113/66 (06/03/20 1125)  SpO2: (!) 91 % (06/03/20 1125) Vital Signs (24h Range):  Temp:  [97.9 °F (36.6 °C)-99 °F (37.2 °C)] 97.9 °F (36.6 °C)  Pulse:  [] 98  Resp:  [18-20] 18  SpO2:  [91 %-97 %] 91 %  BP: ()/(66-83) 113/66       Intake/Output Summary (Last 24 hours) at 6/3/2020 1629  Last data filed at 6/3/2020 0820  Gross per 24 hour   Intake 150 ml   Output 757 ml   Net -607 ml       Physical Exam   Constitutional: He is oriented to person, place, and time. No distress.   Pulmonary/Chest: Effort normal. No respiratory distress.   Abdominal: Soft. He exhibits no distension. There is tenderness. There is no rebound and no guarding.   Abdomen is appropriately tender  Distention resolved.   Incision is clean dry and intact  LESLIE drain appropriate - It was removed at bedside.    Neurological: He is alert and oriented to person, place, and time.        Significant Labs:  CBC:   Recent Labs   Lab 06/03/20  0346   WBC 10.76   RBC 3.89*   HGB 11.5*   HCT 34.5*      MCV 89   MCH 29.6   MCHC 33.3     CMP:   Recent Labs   Lab 06/03/20  0346   *   CALCIUM 8.9   ALBUMIN 2.8*   PROT 6.4      K 3.6   CO2 19*      BUN 33*   CREATININE 1.8*   ALKPHOS 60   ALT 14   AST 13   BILITOT 0.7         Assessment/Plan:     Active Diagnoses:    Diagnosis Date Noted POA    PRINCIPAL PROBLEM:  Acute appendicitis with perforation, localized peritonitis, and abscess, without gangrene [K35.33] 05/30/2020 Yes    Atrial fibrillation [I48.91] 06/03/2020 No    Diaphoresis [R61] 06/02/2020 Yes    KINJAL (acute kidney injury) [N17.9] 05/30/2020 Yes    Type 2 diabetes mellitus without complication [E11.9] 09/11/2015 Yes      Problems Resolved During this Admission:    Diagnosis Date Noted Date Resolved POA    Severe sepsis [A41.9, R65.20] 05/30/2020 06/01/2020 Yes    Acute appendicitis [K35.80] 05/30/2020 06/01/2020 Yes     -Doing much better. OK to DC form surgery standpoint. Recommend discharge with Augmentin 875 BID for another week.  -LESLIE was removed today.   -OK to shower. No baths or outside water for one month.   -No lifting over ten pounds for six weeks.   -Follow up with me two weeks.     Eusebio Gil III, MD  General Surgery  [facility]

## 2020-06-03 NOTE — HPI
75-year-old  male presents emergency room with fever and abdominal pain.       The patient states for the last 2-3 days he has been having fever, chills and right lower quadrant abdominal pain.  He was also having right chest rib pain.  The patient describes his pain as a sharp pressure sensation that worsens with palpation and movement.       The patient states he had been taking Tylenol and ibuprofen without improvement in his symptoms.       The patient states today he had a temperature of a 102° and his right lower quadrant abdominal pain got progressively and intensely worse.  This concerned him so he came to the emergency room for further evaluation.       Upon arrival in emergency room the patient was found to be hypotensive febrile and tachycardic.  He was given IV fluid boluses and diagnostic imaging was ordered.       A CT of the abdomen and pelvis did reveal an acute appendicitis with microperforations.  Surgery was consulted by the ER physician and the plan is for emergent appendectomy.       The patient also had acute kidney injury most likely sepsis secondary to infection and nephrotoxin medications complicated by dehydration.  IV antibiotics were started in the emergency room       COVID-19 test was negative

## 2020-06-03 NOTE — PT/OT/SLP DISCHARGE
Physical Therapy Discharge Summary    Name: Ruy Ramos Jr.  MRN: [id number]   Principal Problem: Acute appendicitis with perforation, localized peritonitis, and abscess, without gangrene     Patient Discharged from acute Physical Therapy on 6/3/2020.  Please refer to prior PT noted date on 6/3/2020 for functional status.     Assessment:     Patient has met all goals and is not appropriate for therapy.    Objective:     GOALS:   Multidisciplinary Problems     Physical Therapy Goals     Not on file          Multidisciplinary Problems (Resolved)        Problem: Physical Therapy Goal    Goal Priority Disciplines Outcome Goal Variances Interventions   Physical Therapy Goal   (Resolved)     PT, PT/OT Met     Description:  Goals to be met by: discharge      Patient will increase functional independence with mobility by performin. Supine to sit with Modified [place]  2. Sit to supine with Modified [place]  3. Sit to stand transfer with Supervision  4. Bed to chair transfer with Supervision   5. Gait  x 200 feet with Supervision without AD                      Reasons for Discontinuation of Therapy Services  Satisfactory goal achievement.      Plan:     Patient Discharged to: in house.    Megan Cage, PT  6/3/2020

## 2020-06-03 NOTE — PLAN OF CARE
This note also relates to the following rows which could not be included:  SpO2 - Cannot attach notes to unvalidated device data  Pulse - Cannot attach notes to unvalidated device data       06/02/20 2030   Patient Assessment/Suction   Level of Consciousness (AVPU) alert   Respiratory Effort Normal;Unlabored   Expansion/Accessory Muscles/Retractions no use of accessory muscles   PRE-TX-O2   O2 Device (Oxygen Therapy) nasal cannula   $ Is the patient on Low Flow Oxygen? Yes   Flow (L/min) 2   Pulse Oximetry Type Continuous   $ Pulse Oximetry - Multiple Charge Pulse Oximetry - Multiple   Resp 18   Respiratory Evaluation   $ Care Plan Tech Time 15 min   Evaluation For Re-Eval 3 day

## 2020-06-03 NOTE — PLAN OF CARE
Problem: Oral Intake Inadequate  Goal: Improved Oral Intake  Outcome: Ongoing, Progressing  Intervention: Promote and Optimize Oral Intake  Flowsheets (Taken 6/3/2020 6667)  Oral Nutrition Promotion: calorie dense liquids provided  1. Added GlucernaTID (660 kcal, 30 g pro)  to aid in meeting needs.  2. RD to continue to monitor diet status and make recommendations accordingly. Patient has been NPO/CLD/ FLD x 4 days.  3. Added Diabetic Diet restrictions to current diet order 2' hx of DM and current hyperglycemia, encourage intake.

## 2020-06-03 NOTE — PROGRESS NOTES
"[facility]  Adult Nutrition  Progress Note    SUMMARY       Recommendations/Interventions  1. Added Diabetic Diet restrictions to current diet order 2' hx of DM and current hyperglycemia, encourage intake.   2. Added GlucernaTID (660 kcal, 30 g pro)  to aid in meeting needs.  3. RD to continue to monitor diet status and make recommendations accordingly. Patient has been NPO/CLD/ FLD x 4 days.    Goals: 1. Pt to meet at least 75% of estimated needs via PO intake.   Nutrition Goal Status: new  Communication of RD Recs: reviewed with RN    Dietitian Rounds Brief  Pt reports he feels much better, denies N/V/D. Was on clear liquids when DI visited patient, nurse was in the room and both patient and nurse agreed pt was ready to advance diet. Diet has been advanced to full liquids-added ONS. Will continue to monitor intake and labs.     Reason for Assessment    Reason For Assessment: length of stay  Diagnosis: surgery/postoperative complications(appendectomy)  Relevant Medical History: DM, HTN, kidney stone  Interdisciplinary Rounds: attended    Nutrition Risk Screen    Nutrition Risk Screen: no indicators present    Nutrition/Diet History    Spiritual, Cultural Beliefs, Confucianism Practices, Values that Affect Care: no  Factors Affecting Nutritional Intake: clear liquid diet    Anthropometrics    Temp: 98.3 °F (36.8 °C)  Height Method: Stated  Height: 5' 9" (175.3 cm)  Height (inches): 69 in  Weight Method: Standard Scale  Weight: 76.9 kg (169 lb 8.5 oz)  Weight (lb): 169.54 lb  Ideal Body Weight (IBW), Male: 160 lb  % Ideal Body Weight, Male (lb): 109.38 %  BMI (Calculated): 25  BMI Grade: 25 - 29.9 - overweight     Lab/Procedures/Meds  Pertinent Labs Reviewed: reviewed  Clinical Chemistry:  Recent Labs   Lab 05/30/20  1543 06/01/20  0412 06/02/20  0429 06/03/20  0346   * 136 136 137   K 4.4 4.2 3.9 3.6    105 103 106   CO2 19* 19* 21* 19*   * 146* 189* 152*   BUN 37* 32* 32* 33* "   CREATININE 2.1* 1.9* 1.7* 1.8*   CALCIUM 9.4 9.1 9.5 8.9   PROT 8.1 7.1 7.2 6.4   ALBUMIN 4.0 3.2* 3.1* 2.8*   BILITOT 0.7 0.8 0.7 0.7   ALKPHOS 95 52* 68 60   AST 17 16 18 13   ALT 16 16 15 14   ANIONGAP 13 12 12 12   ESTGFRAFRICA 34.1* 38.5* 44.0* 41.1*   EGFRNONAA 29.5* 33.3* 38.1* 35.5*   MG  --  2.1 2.3 2.2   PHOS  --  3.7 3.9 4.1   LIPASE 26  --   --   --     < > = values in this interval not displayed.     CBC:   Recent Labs   Lab 06/03/20  0346   WBC 10.76   RBC 3.89*   HGB 11.5*   HCT 34.5*      MCV 89   MCH 29.6   MCHC 33.3     Cardiac Profile:  Recent Labs   Lab 05/30/20  1946   BNP 36     Thyroid & Parathyroid:  Recent Labs   Lab 06/03/20  0346   TSH 1.310   FREET4 0.78     Pertinent Medications Reviewed: reviewed    Estimated/Assessed Needs    Weight Used For Calorie Calculations: 76.9 kg (169 lb 8.5 oz)  Energy Calorie Requirements (kcal): 4360-1999 kcal (25-30 kcal/kg)  Energy Need Method: Kcal/kg  Protein Requirements:  g (1.2-1.5 g/kg)  Weight Used For Protein Calculations: 76.9 kg (169 lb 8.5 oz)     Estimated Fluid Requirement Method: RDA Method  RDA Method (mL): 1923     Nutrition Prescription Ordered    Current Diet Order: full liquid diet    Evaluation of Received Nutrient/Fluid Intake    Energy Calories Required: not meeting needs  Protein Required: not meeting needs  Fluid Required: not meeting needs  Tolerance: tolerating  % Intake of Estimated Energy Needs: 50 - 75 %  % Meal Intake: Other: 75%    Nutrition Risk    Level of Risk/Frequency of Follow-up: moderate - high     Monitor and Evaluation    Food and Nutrient Intake: energy intake, food and beverage intake  Food and Nutrient Adminstration: diet order  Physical Activity and Function: nutrition-related ADLs and IADLs  Anthropometric Measurements: weight, weight change  Biochemical Data, Medical Tests and Procedures: electrolyte and renal panel, gastrointestinal profile, glucose/endocrine profile, inflammatory profile,  lipid profile  Nutrition-Focused Physical Findings: overall appearance     Nutrition Follow-Up    RD Follow-up?: Yes  Kamala Solomon, Dietetic Intern  Reviewed by Olinda Julio RD

## 2020-06-07 LAB — BACTERIA BLD CULT: NORMAL

## 2020-06-11 ENCOUNTER — TELEPHONE (OUTPATIENT)
Dept: SURGERY | Facility: CLINIC | Age: 75
End: 2020-06-11

## 2020-06-11 ENCOUNTER — OFFICE VISIT (OUTPATIENT)
Dept: SURGERY | Facility: CLINIC | Age: 75
End: 2020-06-11
Payer: MEDICARE

## 2020-06-11 VITALS
HEIGHT: 69 IN | BODY MASS INDEX: 25.03 KG/M2 | SYSTOLIC BLOOD PRESSURE: 133 MMHG | DIASTOLIC BLOOD PRESSURE: 82 MMHG | WEIGHT: 169 LBS | HEART RATE: 61 BPM | TEMPERATURE: 97 F

## 2020-06-11 DIAGNOSIS — K35.32 ACUTE PERFORATED APPENDICITIS: Primary | ICD-10-CM

## 2020-06-11 PROCEDURE — 99024 PR POST-OP FOLLOW-UP VISIT: ICD-10-PCS | Mod: S$GLB,,, | Performed by: SURGERY

## 2020-06-11 PROCEDURE — 99024 POSTOP FOLLOW-UP VISIT: CPT | Mod: S$GLB,,, | Performed by: SURGERY

## 2020-06-11 NOTE — TELEPHONE ENCOUNTER
Reviewed post op Covid 19 screening questions with pt. He denies having cough, shortness of breath or fever. Has not been tested for Covid 19 since surgery on 5/31/20.   
Yes

## 2020-06-15 LAB
BACTERIA BLD CULT: ABNORMAL

## 2020-06-22 NOTE — PROGRESS NOTES
Subjective:       Patient ID: Ruy Ramos Jr. is a 75 y.o. male.    Chief Complaint: Post-op Evaluation (FU DOS 5/31/20 Open Appy w/ partial cecectomy )      HPI:  Patient is status post open partial cecal resection and appendectomy for perforated appendicitis.  Pathology is benign.  He is feeling well.  Afebrile.  Tolerating diet.    Review of Systems    Objective:      Physical Exam  Constitutional:       General: He is not in acute distress.  Pulmonary:      Effort: Pulmonary effort is normal. No respiratory distress.   Abdominal:      General: There is no distension.      Palpations: Abdomen is soft.      Tenderness: There is no abdominal tenderness. There is no guarding or rebound.          Comments: Jordon removed.  Incision clean dry and intact.   Neurological:      Mental Status: He is alert and oriented to person, place, and time.         Assessment/Plan:   Ruy was seen today for post-op evaluation.    Diagnoses and all orders for this visit:    Acute perforated appendicitis      Status post open appendectomy and partial seek ectomy for perforated appendicitis.  Staples removed.  Doing well.  Pathology is benign.  Follow up if symptoms worsen or fail to improve.

## 2020-06-23 ENCOUNTER — TELEPHONE (OUTPATIENT)
Dept: NEPHROLOGY | Facility: CLINIC | Age: 75
End: 2020-06-23

## 2020-06-26 LAB
ALBUMIN SERPL-MCNC: 4.7 G/DL (ref 3.7–4.7)
BUN SERPL-MCNC: 30 MG/DL (ref 8–27)
BUN/CREAT SERPL: 18 (ref 10–24)
CALCIUM SERPL-MCNC: 9.9 MG/DL (ref 8.6–10.2)
CHLORIDE SERPL-SCNC: 99 MMOL/L (ref 96–106)
CO2 SERPL-SCNC: 21 MMOL/L (ref 20–29)
CREAT SERPL-MCNC: 1.67 MG/DL (ref 0.76–1.27)
CREAT UR-MCNC: 131.7 MG/DL
GLUCOSE SERPL-MCNC: 130 MG/DL (ref 65–99)
PHOSPHATE SERPL-MCNC: 3.8 MG/DL (ref 2.8–4.1)
POTASSIUM SERPL-SCNC: 4.8 MMOL/L (ref 3.5–5.2)
PROT UR-MCNC: 35.7 MG/DL
PROT/CREAT UR: 271 MG/G CREAT (ref 0–200)
PTH-INTACT SERPL-MCNC: 13 PG/ML (ref 15–65)
SODIUM SERPL-SCNC: 140 MMOL/L (ref 134–144)

## 2020-07-02 ENCOUNTER — OFFICE VISIT (OUTPATIENT)
Dept: NEPHROLOGY | Facility: CLINIC | Age: 75
End: 2020-07-02
Payer: MEDICARE

## 2020-07-02 VITALS
OXYGEN SATURATION: 98 % | DIASTOLIC BLOOD PRESSURE: 72 MMHG | HEIGHT: 69 IN | BODY MASS INDEX: 24.29 KG/M2 | HEART RATE: 55 BPM | SYSTOLIC BLOOD PRESSURE: 120 MMHG | WEIGHT: 164 LBS

## 2020-07-02 DIAGNOSIS — N20.0 NEPHROLITHIASIS: ICD-10-CM

## 2020-07-02 DIAGNOSIS — I10 ESSENTIAL HYPERTENSION: ICD-10-CM

## 2020-07-02 DIAGNOSIS — E11.21 TYPE 2 DIABETES MELLITUS WITH DIABETIC NEPHROPATHY, WITHOUT LONG-TERM CURRENT USE OF INSULIN: ICD-10-CM

## 2020-07-02 DIAGNOSIS — N18.30 CHRONIC KIDNEY DISEASE, STAGE III (MODERATE): Primary | ICD-10-CM

## 2020-07-02 DIAGNOSIS — N28.1 ACQUIRED CYST OF KIDNEY: ICD-10-CM

## 2020-07-02 PROCEDURE — 1101F PR PT FALLS ASSESS DOC 0-1 FALLS W/OUT INJ PAST YR: ICD-10-PCS | Mod: CPTII,S$GLB,, | Performed by: INTERNAL MEDICINE

## 2020-07-02 PROCEDURE — 99213 OFFICE O/P EST LOW 20 MIN: CPT | Mod: S$GLB,,, | Performed by: INTERNAL MEDICINE

## 2020-07-02 PROCEDURE — 1159F PR MEDICATION LIST DOCUMENTED IN MEDICAL RECORD: ICD-10-PCS | Mod: S$GLB,,, | Performed by: INTERNAL MEDICINE

## 2020-07-02 PROCEDURE — 1159F MED LIST DOCD IN RCRD: CPT | Mod: S$GLB,,, | Performed by: INTERNAL MEDICINE

## 2020-07-02 PROCEDURE — 3074F SYST BP LT 130 MM HG: CPT | Mod: CPTII,S$GLB,, | Performed by: INTERNAL MEDICINE

## 2020-07-02 PROCEDURE — 3078F DIAST BP <80 MM HG: CPT | Mod: CPTII,S$GLB,, | Performed by: INTERNAL MEDICINE

## 2020-07-02 PROCEDURE — 99213 PR OFFICE/OUTPT VISIT, EST, LEVL III, 20-29 MIN: ICD-10-PCS | Mod: S$GLB,,, | Performed by: INTERNAL MEDICINE

## 2020-07-02 PROCEDURE — 99999 PR PBB SHADOW E&M-EST. PATIENT-LVL III: ICD-10-PCS | Mod: PBBFAC,,, | Performed by: INTERNAL MEDICINE

## 2020-07-02 PROCEDURE — 3074F PR MOST RECENT SYSTOLIC BLOOD PRESSURE < 130 MM HG: ICD-10-PCS | Mod: CPTII,S$GLB,, | Performed by: INTERNAL MEDICINE

## 2020-07-02 PROCEDURE — 99999 PR PBB SHADOW E&M-EST. PATIENT-LVL III: CPT | Mod: PBBFAC,,, | Performed by: INTERNAL MEDICINE

## 2020-07-02 PROCEDURE — 3078F PR MOST RECENT DIASTOLIC BLOOD PRESSURE < 80 MM HG: ICD-10-PCS | Mod: CPTII,S$GLB,, | Performed by: INTERNAL MEDICINE

## 2020-07-02 PROCEDURE — 1101F PT FALLS ASSESS-DOCD LE1/YR: CPT | Mod: CPTII,S$GLB,, | Performed by: INTERNAL MEDICINE

## 2020-07-02 NOTE — PROGRESS NOTES
"Subjective:       Patient ID: Ruy Ramos Jr. is a 75 y.o. White male who presents for return patient evaluation for chronic renal failure.    He had an appendectomy on May 30th and is feeling better since that time.  He has no uremic or urinary symptoms and is in his usual state of health.  He is eating well and has gained a few pounds.      Review of Systems   Constitutional: Negative for appetite change, chills and fever.   HENT: Negative for congestion.    Eyes: Positive for visual disturbance.   Respiratory: Negative for cough and shortness of breath.    Cardiovascular: Negative for chest pain and leg swelling.   Gastrointestinal: Negative for abdominal pain, diarrhea, nausea and vomiting.   Genitourinary: Negative for difficulty urinating, dysuria and hematuria.   Musculoskeletal: Negative for myalgias.   Skin: Negative for rash.   Neurological: Positive for numbness (feet). Negative for headaches.   Psychiatric/Behavioral: Negative for sleep disturbance.       The past medical, family and social histories were reviewed for this encounter.     /72 (BP Location: Right arm, Patient Position: Sitting)   Pulse (!) 55   Ht 5' 9" (1.753 m)   Wt 74.4 kg (164 lb 0.4 oz)   SpO2 98%   BMI 24.22 kg/m²     Objective:      Physical Exam  Vitals signs reviewed.   Constitutional:       General: He is not in acute distress.     Appearance: He is well-developed.   HENT:      Head: Normocephalic and atraumatic.   Eyes:      General: No scleral icterus.     Conjunctiva/sclera: Conjunctivae normal.   Neck:      Musculoskeletal: Normal range of motion.      Vascular: No JVD.   Cardiovascular:      Rate and Rhythm: Normal rate and regular rhythm.      Heart sounds: Normal heart sounds. No murmur. No friction rub. No gallop.    Pulmonary:      Effort: Pulmonary effort is normal. No respiratory distress.      Breath sounds: Normal breath sounds. No wheezing.   Abdominal:      General: Bowel sounds are normal. " There is no distension.      Palpations: Abdomen is soft.      Tenderness: There is no abdominal tenderness.   Skin:     General: Skin is warm and dry.      Findings: No rash.         Assessment:       1. Chronic kidney disease, stage III (moderate)    2. Nephrolithiasis    3. Essential hypertension    4. Acquired cyst of kidney    5. Type 2 diabetes mellitus with diabetic nephropathy, without long-term current use of insulin        Plan:   Return to clinic in 5-6 months.  Labs for next visit include rp, pth, upc.  Baseline creatinine is 1.5.  He is back to near his baseline now and is improving again.  PTH is 13 with a calcium of 9.9.  Renal Us shows R 10.9 cm L 11.9 cm.  Blood pressure is controlled on the current regimen.  Continue current medications as prescribed and reviewed.   Please have patient follow-up with your PCP or Endocrinology regarding the control and management of diabetes.

## 2020-12-10 ENCOUNTER — TELEPHONE (OUTPATIENT)
Dept: NEPHROLOGY | Facility: CLINIC | Age: 75
End: 2020-12-10

## 2020-12-10 NOTE — TELEPHONE ENCOUNTER
----- Message from Francisca Jones sent at 12/10/2020  2:43 PM CST -----  Contact: Lizz Ramos (Spouse)  Lizz Ramos (Spouse) ph# [phone], requesting lab and urine order to be sent to [facility] in [place].

## 2020-12-11 ENCOUNTER — TELEPHONE (OUTPATIENT)
Dept: FAMILY MEDICINE | Facility: CLINIC | Age: 75
End: 2020-12-11

## 2020-12-11 DIAGNOSIS — E78.5 HYPERLIPIDEMIA, UNSPECIFIED HYPERLIPIDEMIA TYPE: ICD-10-CM

## 2020-12-11 DIAGNOSIS — I10 HYPERTENSION, UNSPECIFIED TYPE: Primary | ICD-10-CM

## 2021-01-06 ENCOUNTER — TELEPHONE (OUTPATIENT)
Dept: FAMILY MEDICINE | Facility: CLINIC | Age: 76
End: 2021-01-06

## 2021-01-06 DIAGNOSIS — E11.9 TYPE 2 DIABETES MELLITUS WITHOUT COMPLICATION, WITHOUT LONG-TERM CURRENT USE OF INSULIN: Primary | ICD-10-CM

## 2021-01-10 ENCOUNTER — IMMUNIZATION (OUTPATIENT)
Dept: PRIMARY CARE CLINIC | Facility: CLINIC | Age: 76
End: 2021-01-10
Payer: MEDICARE

## 2021-01-10 DIAGNOSIS — Z23 NEED FOR VACCINATION: ICD-10-CM

## 2021-01-10 PROCEDURE — 0001A COVID-19, MRNA, LNP-S, PF, 30 MCG/0.3 ML DOSE VACCINE: ICD-10-PCS | Mod: S$GLB,,, | Performed by: FAMILY MEDICINE

## 2021-01-10 PROCEDURE — 91300 COVID-19, MRNA, LNP-S, PF, 30 MCG/0.3 ML DOSE VACCINE: CPT | Mod: S$GLB,,, | Performed by: FAMILY MEDICINE

## 2021-01-10 PROCEDURE — 0001A COVID-19, MRNA, LNP-S, PF, 30 MCG/0.3 ML DOSE VACCINE: CPT | Mod: S$GLB,,, | Performed by: FAMILY MEDICINE

## 2021-01-10 PROCEDURE — 91300 COVID-19, MRNA, LNP-S, PF, 30 MCG/0.3 ML DOSE VACCINE: ICD-10-PCS | Mod: S$GLB,,, | Performed by: FAMILY MEDICINE

## 2021-01-14 ENCOUNTER — TELEPHONE (OUTPATIENT)
Dept: FAMILY MEDICINE | Facility: CLINIC | Age: 76
End: 2021-01-14

## 2021-01-14 ENCOUNTER — TELEPHONE (OUTPATIENT)
Dept: NEPHROLOGY | Facility: CLINIC | Age: 76
End: 2021-01-14

## 2021-01-14 DIAGNOSIS — N18.30 STAGE 3 CHRONIC KIDNEY DISEASE, UNSPECIFIED WHETHER STAGE 3A OR 3B CKD: Primary | ICD-10-CM

## 2021-01-14 RX ORDER — APIXABAN 5 MG/1
5 TABLET, FILM COATED ORAL 2 TIMES DAILY
COMMUNITY
Start: 2021-01-01 | End: 2021-06-29 | Stop reason: ALTCHOICE

## 2021-01-14 RX ORDER — METOPROLOL SUCCINATE 25 MG/1
TABLET, EXTENDED RELEASE ORAL
COMMUNITY
End: 2021-01-14

## 2021-01-14 RX ORDER — AMLODIPINE BESYLATE 5 MG/1
TABLET ORAL
COMMUNITY
Start: 2021-01-04 | End: 2021-08-10

## 2021-01-31 ENCOUNTER — IMMUNIZATION (OUTPATIENT)
Dept: PRIMARY CARE CLINIC | Facility: CLINIC | Age: 76
End: 2021-01-31
Payer: MEDICARE

## 2021-01-31 DIAGNOSIS — Z23 NEED FOR VACCINATION: Primary | ICD-10-CM

## 2021-01-31 PROCEDURE — 0002A COVID-19, MRNA, LNP-S, PF, 30 MCG/0.3 ML DOSE VACCINE: ICD-10-PCS | Mod: S$GLB,,, | Performed by: FAMILY MEDICINE

## 2021-01-31 PROCEDURE — 0002A COVID-19, MRNA, LNP-S, PF, 30 MCG/0.3 ML DOSE VACCINE: CPT | Mod: S$GLB,,, | Performed by: FAMILY MEDICINE

## 2021-01-31 PROCEDURE — 91300 COVID-19, MRNA, LNP-S, PF, 30 MCG/0.3 ML DOSE VACCINE: ICD-10-PCS | Mod: S$GLB,,, | Performed by: FAMILY MEDICINE

## 2021-01-31 PROCEDURE — 91300 COVID-19, MRNA, LNP-S, PF, 30 MCG/0.3 ML DOSE VACCINE: CPT | Mod: S$GLB,,, | Performed by: FAMILY MEDICINE

## 2021-02-17 LAB
LEFT EYE DM RETINOPATHY: NEGATIVE
RIGHT EYE DM RETINOPATHY: NEGATIVE

## 2021-02-25 ENCOUNTER — LAB VISIT (OUTPATIENT)
Dept: LAB | Facility: CLINIC | Age: 76
End: 2021-02-25
Payer: MEDICARE

## 2021-02-25 DIAGNOSIS — N18.30 STAGE 3 CHRONIC KIDNEY DISEASE, UNSPECIFIED WHETHER STAGE 3A OR 3B CKD: ICD-10-CM

## 2021-02-25 DIAGNOSIS — E78.5 HYPERLIPIDEMIA, UNSPECIFIED HYPERLIPIDEMIA TYPE: ICD-10-CM

## 2021-02-25 DIAGNOSIS — I10 HYPERTENSION, UNSPECIFIED TYPE: ICD-10-CM

## 2021-02-25 DIAGNOSIS — Z12.5 SCREENING PSA (PROSTATE SPECIFIC ANTIGEN): ICD-10-CM

## 2021-02-25 DIAGNOSIS — Z12.5 SCREENING PSA (PROSTATE SPECIFIC ANTIGEN): Primary | ICD-10-CM

## 2021-02-25 LAB
ALBUMIN SERPL BCP-MCNC: 4.2 G/DL (ref 3.5–5.2)
ALBUMIN SERPL BCP-MCNC: 4.2 G/DL (ref 3.5–5.2)
ALP SERPL-CCNC: 94 U/L (ref 55–135)
ALT SERPL W/O P-5'-P-CCNC: 15 U/L (ref 10–44)
ANION GAP SERPL CALC-SCNC: 10 MMOL/L (ref 8–16)
ANION GAP SERPL CALC-SCNC: 10 MMOL/L (ref 8–16)
AST SERPL-CCNC: 19 U/L (ref 10–40)
BILIRUB SERPL-MCNC: 0.3 MG/DL (ref 0.1–1)
BUN SERPL-MCNC: 37 MG/DL (ref 8–23)
BUN SERPL-MCNC: 37 MG/DL (ref 8–23)
CALCIUM SERPL-MCNC: 10 MG/DL (ref 8.7–10.5)
CALCIUM SERPL-MCNC: 10 MG/DL (ref 8.7–10.5)
CHLORIDE SERPL-SCNC: 108 MMOL/L (ref 95–110)
CHLORIDE SERPL-SCNC: 108 MMOL/L (ref 95–110)
CHOLEST SERPL-MCNC: 248 MG/DL (ref 120–199)
CHOLEST/HDLC SERPL: 5 {RATIO} (ref 2–5)
CO2 SERPL-SCNC: 22 MMOL/L (ref 23–29)
CO2 SERPL-SCNC: 22 MMOL/L (ref 23–29)
COMPLEXED PSA SERPL-MCNC: 1.1 NG/ML (ref 0–4)
CREAT SERPL-MCNC: 2.2 MG/DL (ref 0.5–1.4)
CREAT SERPL-MCNC: 2.2 MG/DL (ref 0.5–1.4)
EST. GFR  (AFRICAN AMERICAN): 32 ML/MIN/1.73 M^2
EST. GFR  (AFRICAN AMERICAN): 32 ML/MIN/1.73 M^2
EST. GFR  (NON AFRICAN AMERICAN): 28 ML/MIN/1.73 M^2
EST. GFR  (NON AFRICAN AMERICAN): 28 ML/MIN/1.73 M^2
GLUCOSE SERPL-MCNC: 128 MG/DL (ref 70–110)
GLUCOSE SERPL-MCNC: 128 MG/DL (ref 70–110)
HDLC SERPL-MCNC: 50 MG/DL (ref 40–75)
HDLC SERPL: 20.2 % (ref 20–50)
LDLC SERPL CALC-MCNC: 173 MG/DL (ref 63–159)
NONHDLC SERPL-MCNC: 198 MG/DL
PHOSPHATE SERPL-MCNC: 4.1 MG/DL (ref 2.7–4.5)
POTASSIUM SERPL-SCNC: 5.1 MMOL/L (ref 3.5–5.1)
POTASSIUM SERPL-SCNC: 5.1 MMOL/L (ref 3.5–5.1)
PROT SERPL-MCNC: 8.1 G/DL (ref 6–8.4)
PTH-INTACT SERPL-MCNC: 21.5 PG/ML (ref 9–77)
SODIUM SERPL-SCNC: 140 MMOL/L (ref 136–145)
SODIUM SERPL-SCNC: 140 MMOL/L (ref 136–145)
TRIGL SERPL-MCNC: 125 MG/DL (ref 30–150)

## 2021-02-25 PROCEDURE — 80061 LIPID PANEL: CPT

## 2021-02-25 PROCEDURE — 83970 ASSAY OF PARATHORMONE: CPT

## 2021-02-25 PROCEDURE — 80069 RENAL FUNCTION PANEL: CPT

## 2021-02-25 PROCEDURE — 80053 COMPREHEN METABOLIC PANEL: CPT

## 2021-02-25 PROCEDURE — 84153 ASSAY OF PSA TOTAL: CPT

## 2021-02-25 PROCEDURE — 36415 PR COLLECTION VENOUS BLOOD,VENIPUNCTURE: ICD-10-PCS | Mod: ,,, | Performed by: INTERNAL MEDICINE

## 2021-02-25 PROCEDURE — 36415 COLL VENOUS BLD VENIPUNCTURE: CPT | Mod: ,,, | Performed by: INTERNAL MEDICINE

## 2021-03-04 ENCOUNTER — LAB VISIT (OUTPATIENT)
Dept: LAB | Facility: HOSPITAL | Age: 76
End: 2021-03-04
Attending: INTERNAL MEDICINE
Payer: MEDICARE

## 2021-03-04 ENCOUNTER — OFFICE VISIT (OUTPATIENT)
Dept: NEPHROLOGY | Facility: CLINIC | Age: 76
End: 2021-03-04
Payer: MEDICARE

## 2021-03-04 VITALS
OXYGEN SATURATION: 98 % | HEIGHT: 69 IN | WEIGHT: 180.31 LBS | DIASTOLIC BLOOD PRESSURE: 68 MMHG | BODY MASS INDEX: 26.71 KG/M2 | HEART RATE: 61 BPM | SYSTOLIC BLOOD PRESSURE: 116 MMHG

## 2021-03-04 DIAGNOSIS — N20.0 NEPHROLITHIASIS: ICD-10-CM

## 2021-03-04 DIAGNOSIS — I10 ESSENTIAL HYPERTENSION: ICD-10-CM

## 2021-03-04 DIAGNOSIS — E11.21 TYPE 2 DIABETES MELLITUS WITH DIABETIC NEPHROPATHY, WITHOUT LONG-TERM CURRENT USE OF INSULIN: ICD-10-CM

## 2021-03-04 DIAGNOSIS — N18.30 STAGE 3 CHRONIC KIDNEY DISEASE, UNSPECIFIED WHETHER STAGE 3A OR 3B CKD: ICD-10-CM

## 2021-03-04 DIAGNOSIS — N18.30 STAGE 3 CHRONIC KIDNEY DISEASE, UNSPECIFIED WHETHER STAGE 3A OR 3B CKD: Primary | ICD-10-CM

## 2021-03-04 LAB
BILIRUB UR QL STRIP: NEGATIVE
CLARITY UR: CLEAR
COLOR UR: YELLOW
GLUCOSE UR QL STRIP: ABNORMAL
HGB UR QL STRIP: NEGATIVE
KETONES UR QL STRIP: NEGATIVE
LEUKOCYTE ESTERASE UR QL STRIP: NEGATIVE
NITRITE UR QL STRIP: NEGATIVE
PH UR STRIP: 6 [PH] (ref 5–8)
PROT UR QL STRIP: ABNORMAL
SP GR UR STRIP: 1.02 (ref 1–1.03)
URN SPEC COLLECT METH UR: ABNORMAL

## 2021-03-04 PROCEDURE — 1159F PR MEDICATION LIST DOCUMENTED IN MEDICAL RECORD: ICD-10-PCS | Mod: S$GLB,,, | Performed by: INTERNAL MEDICINE

## 2021-03-04 PROCEDURE — 81003 URINALYSIS AUTO W/O SCOPE: CPT | Mod: PO | Performed by: INTERNAL MEDICINE

## 2021-03-04 PROCEDURE — 1159F MED LIST DOCD IN RCRD: CPT | Mod: S$GLB,,, | Performed by: INTERNAL MEDICINE

## 2021-03-04 PROCEDURE — 3288F FALL RISK ASSESSMENT DOCD: CPT | Mod: CPTII,S$GLB,, | Performed by: INTERNAL MEDICINE

## 2021-03-04 PROCEDURE — 1101F PT FALLS ASSESS-DOCD LE1/YR: CPT | Mod: CPTII,S$GLB,, | Performed by: INTERNAL MEDICINE

## 2021-03-04 PROCEDURE — 99214 PR OFFICE/OUTPT VISIT, EST, LEVL IV, 30-39 MIN: ICD-10-PCS | Mod: S$GLB,,, | Performed by: INTERNAL MEDICINE

## 2021-03-04 PROCEDURE — 1101F PR PT FALLS ASSESS DOC 0-1 FALLS W/OUT INJ PAST YR: ICD-10-PCS | Mod: CPTII,S$GLB,, | Performed by: INTERNAL MEDICINE

## 2021-03-04 PROCEDURE — 80069 RENAL FUNCTION PANEL: CPT | Performed by: INTERNAL MEDICINE

## 2021-03-04 PROCEDURE — 36415 COLL VENOUS BLD VENIPUNCTURE: CPT | Mod: PO | Performed by: INTERNAL MEDICINE

## 2021-03-04 PROCEDURE — 3078F DIAST BP <80 MM HG: CPT | Mod: CPTII,S$GLB,, | Performed by: INTERNAL MEDICINE

## 2021-03-04 PROCEDURE — 99999 PR PBB SHADOW E&M-EST. PATIENT-LVL IV: CPT | Mod: PBBFAC,,, | Performed by: INTERNAL MEDICINE

## 2021-03-04 PROCEDURE — 3288F PR FALLS RISK ASSESSMENT DOCUMENTED: ICD-10-PCS | Mod: CPTII,S$GLB,, | Performed by: INTERNAL MEDICINE

## 2021-03-04 PROCEDURE — 99999 PR PBB SHADOW E&M-EST. PATIENT-LVL IV: ICD-10-PCS | Mod: PBBFAC,,, | Performed by: INTERNAL MEDICINE

## 2021-03-04 PROCEDURE — 99214 OFFICE O/P EST MOD 30 MIN: CPT | Mod: S$GLB,,, | Performed by: INTERNAL MEDICINE

## 2021-03-04 PROCEDURE — 3074F PR MOST RECENT SYSTOLIC BLOOD PRESSURE < 130 MM HG: ICD-10-PCS | Mod: CPTII,S$GLB,, | Performed by: INTERNAL MEDICINE

## 2021-03-04 PROCEDURE — 3074F SYST BP LT 130 MM HG: CPT | Mod: CPTII,S$GLB,, | Performed by: INTERNAL MEDICINE

## 2021-03-04 PROCEDURE — 3078F PR MOST RECENT DIASTOLIC BLOOD PRESSURE < 80 MM HG: ICD-10-PCS | Mod: CPTII,S$GLB,, | Performed by: INTERNAL MEDICINE

## 2021-03-05 LAB
ALBUMIN SERPL BCP-MCNC: 3.9 G/DL (ref 3.5–5.2)
ANION GAP SERPL CALC-SCNC: 14 MMOL/L (ref 8–16)
BUN SERPL-MCNC: 30 MG/DL (ref 8–23)
CALCIUM SERPL-MCNC: 9.9 MG/DL (ref 8.7–10.5)
CHLORIDE SERPL-SCNC: 107 MMOL/L (ref 95–110)
CO2 SERPL-SCNC: 19 MMOL/L (ref 23–29)
CREAT SERPL-MCNC: 2.2 MG/DL (ref 0.5–1.4)
EST. GFR  (AFRICAN AMERICAN): 32 ML/MIN/1.73 M^2
EST. GFR  (NON AFRICAN AMERICAN): 27.7 ML/MIN/1.73 M^2
GLUCOSE SERPL-MCNC: 119 MG/DL (ref 70–110)
PHOSPHATE SERPL-MCNC: 3.4 MG/DL (ref 2.7–4.5)
POTASSIUM SERPL-SCNC: 4.5 MMOL/L (ref 3.5–5.1)
SODIUM SERPL-SCNC: 140 MMOL/L (ref 136–145)

## 2021-03-07 ENCOUNTER — HOSPITAL ENCOUNTER (EMERGENCY)
Facility: HOSPITAL | Age: 76
Discharge: HOME | End: 2021-03-07
Attending: EMERGENCY MEDICINE
Payer: MEDICARE

## 2021-03-07 VITALS
SYSTOLIC BLOOD PRESSURE: 116 MMHG | DIASTOLIC BLOOD PRESSURE: 64 MMHG | OXYGEN SATURATION: 100 % | RESPIRATION RATE: 16 BRPM | HEART RATE: 72 BPM | TEMPERATURE: 99 F | HEIGHT: 69 IN | WEIGHT: 180 LBS | BODY MASS INDEX: 26.66 KG/M2

## 2021-03-07 DIAGNOSIS — L02.91 ABSCESS: Primary | ICD-10-CM

## 2021-03-07 PROCEDURE — 46040 I&D ISCHIORCT&/PERIRCT ABSC: CPT

## 2021-03-07 PROCEDURE — 99282 EMERGENCY DEPT VISIT SF MDM: CPT | Mod: 25

## 2021-03-07 PROCEDURE — 25000003 PHARM REV CODE 250: Performed by: NURSE PRACTITIONER

## 2021-03-07 RX ORDER — CLINDAMYCIN HYDROCHLORIDE 300 MG/1
300 CAPSULE ORAL EVERY 6 HOURS
Qty: 40 CAPSULE | Refills: 0 | Status: SHIPPED | OUTPATIENT
Start: 2021-03-07 | End: 2021-03-17

## 2021-03-07 RX ORDER — LIDOCAINE HYDROCHLORIDE 10 MG/ML
10 INJECTION INFILTRATION; PERINEURAL
Status: COMPLETED | OUTPATIENT
Start: 2021-03-07 | End: 2021-03-07

## 2021-03-07 RX ADMIN — LIDOCAINE HYDROCHLORIDE 10 ML: 10 INJECTION, SOLUTION INFILTRATION; PERINEURAL at 10:03

## 2021-03-09 ENCOUNTER — PATIENT OUTREACH (OUTPATIENT)
Dept: ADMINISTRATIVE | Facility: HOSPITAL | Age: 76
End: 2021-03-09

## 2021-03-17 ENCOUNTER — TELEPHONE (OUTPATIENT)
Dept: NEPHROLOGY | Facility: CLINIC | Age: 76
End: 2021-03-17

## 2021-03-25 ENCOUNTER — OFFICE VISIT (OUTPATIENT)
Dept: FAMILY MEDICINE | Facility: CLINIC | Age: 76
End: 2021-03-25
Payer: MEDICARE

## 2021-03-25 VITALS
HEIGHT: 69 IN | TEMPERATURE: 97 F | WEIGHT: 177.19 LBS | OXYGEN SATURATION: 98 % | SYSTOLIC BLOOD PRESSURE: 114 MMHG | DIASTOLIC BLOOD PRESSURE: 64 MMHG | HEART RATE: 62 BPM | BODY MASS INDEX: 26.24 KG/M2

## 2021-03-25 DIAGNOSIS — Z11.59 ENCOUNTER FOR HEPATITIS C SCREENING TEST FOR LOW RISK PATIENT: ICD-10-CM

## 2021-03-25 DIAGNOSIS — E78.2 MIXED HYPERLIPIDEMIA: ICD-10-CM

## 2021-03-25 DIAGNOSIS — Z23 IMMUNIZATION DUE: ICD-10-CM

## 2021-03-25 DIAGNOSIS — E08.21 DIABETIC NEPHROPATHY ASSOCIATED WITH DIABETES MELLITUS DUE TO UNDERLYING CONDITION: Primary | ICD-10-CM

## 2021-03-25 DIAGNOSIS — I77.9 CAROTID ARTERY DISEASE, UNSPECIFIED LATERALITY, UNSPECIFIED TYPE: ICD-10-CM

## 2021-03-25 PROCEDURE — 99214 OFFICE O/P EST MOD 30 MIN: CPT | Mod: 25,S$GLB,, | Performed by: FAMILY MEDICINE

## 2021-03-25 PROCEDURE — 99214 PR OFFICE/OUTPT VISIT, EST, LEVL IV, 30-39 MIN: ICD-10-PCS | Mod: 25,S$GLB,, | Performed by: FAMILY MEDICINE

## 2021-03-25 PROCEDURE — 1101F PT FALLS ASSESS-DOCD LE1/YR: CPT | Mod: CPTII,S$GLB,, | Performed by: FAMILY MEDICINE

## 2021-03-25 PROCEDURE — 1159F MED LIST DOCD IN RCRD: CPT | Mod: S$GLB,,, | Performed by: FAMILY MEDICINE

## 2021-03-25 PROCEDURE — 90670 PCV13 VACCINE IM: CPT | Mod: S$GLB,,, | Performed by: FAMILY MEDICINE

## 2021-03-25 PROCEDURE — G0009 ADMIN PNEUMOCOCCAL VACCINE: HCPCS | Mod: S$GLB,,, | Performed by: FAMILY MEDICINE

## 2021-03-25 PROCEDURE — G0009 PNEUMOCOCCAL CONJUGATE VACCINE 13-VALENT LESS THAN 5YO & GREATER THAN: ICD-10-PCS | Mod: S$GLB,,, | Performed by: FAMILY MEDICINE

## 2021-03-25 PROCEDURE — 3288F PR FALLS RISK ASSESSMENT DOCUMENTED: ICD-10-PCS | Mod: CPTII,S$GLB,, | Performed by: FAMILY MEDICINE

## 2021-03-25 PROCEDURE — 1101F PR PT FALLS ASSESS DOC 0-1 FALLS W/OUT INJ PAST YR: ICD-10-PCS | Mod: CPTII,S$GLB,, | Performed by: FAMILY MEDICINE

## 2021-03-25 PROCEDURE — 1159F PR MEDICATION LIST DOCUMENTED IN MEDICAL RECORD: ICD-10-PCS | Mod: S$GLB,,, | Performed by: FAMILY MEDICINE

## 2021-03-25 PROCEDURE — 3288F FALL RISK ASSESSMENT DOCD: CPT | Mod: CPTII,S$GLB,, | Performed by: FAMILY MEDICINE

## 2021-03-25 PROCEDURE — 90670 PNEUMOCOCCAL CONJUGATE VACCINE 13-VALENT LESS THAN 5YO & GREATER THAN: ICD-10-PCS | Mod: S$GLB,,, | Performed by: FAMILY MEDICINE

## 2021-03-25 RX ORDER — CYANOCOBALAMIN/FOLIC AC/VIT B6 0.5-2.2-25
1 TABLET ORAL DAILY
Qty: 90 EACH | Refills: 3 | Status: SHIPPED | OUTPATIENT
Start: 2021-03-25 | End: 2022-03-24 | Stop reason: SDUPTHER

## 2021-03-25 RX ORDER — PRAVASTATIN SODIUM 10 MG/1
10 TABLET ORAL DAILY
Qty: 90 TABLET | Refills: 3 | Status: SHIPPED | OUTPATIENT
Start: 2021-03-25 | End: 2022-03-24 | Stop reason: SDUPTHER

## 2021-05-13 ENCOUNTER — HOSPITAL ENCOUNTER (OUTPATIENT)
Dept: RADIOLOGY | Facility: HOSPITAL | Age: 76
Discharge: HOME | End: 2021-05-13
Attending: SPECIALIST
Payer: MEDICARE

## 2021-05-13 DIAGNOSIS — N20.1 CALCULUS OF URETER: ICD-10-CM

## 2021-05-13 DIAGNOSIS — N20.0 URIC ACID NEPHROLITHIASIS: Primary | ICD-10-CM

## 2021-05-13 DIAGNOSIS — N20.0 URIC ACID NEPHROLITHIASIS: ICD-10-CM

## 2021-05-13 PROCEDURE — 74018 RADEX ABDOMEN 1 VIEW: CPT | Mod: TC,PO

## 2021-06-02 ENCOUNTER — TELEPHONE (OUTPATIENT)
Dept: FAMILY MEDICINE | Facility: CLINIC | Age: 76
End: 2021-06-02

## 2021-06-16 ENCOUNTER — LAB VISIT (OUTPATIENT)
Dept: LAB | Facility: CLINIC | Age: 76
End: 2021-06-16
Payer: MEDICARE

## 2021-06-16 DIAGNOSIS — E08.21 DIABETIC NEPHROPATHY ASSOCIATED WITH DIABETES MELLITUS DUE TO UNDERLYING CONDITION: ICD-10-CM

## 2021-06-16 DIAGNOSIS — E78.2 MIXED HYPERLIPIDEMIA: ICD-10-CM

## 2021-06-16 DIAGNOSIS — Z11.59 ENCOUNTER FOR HEPATITIS C SCREENING TEST FOR LOW RISK PATIENT: ICD-10-CM

## 2021-06-16 LAB
ALBUMIN SERPL BCP-MCNC: 4 G/DL (ref 3.5–5.2)
ALP SERPL-CCNC: 115 U/L (ref 55–135)
ALT SERPL W/O P-5'-P-CCNC: 15 U/L (ref 10–44)
ANION GAP SERPL CALC-SCNC: 10 MMOL/L (ref 8–16)
AST SERPL-CCNC: 20 U/L (ref 10–40)
BILIRUB SERPL-MCNC: 0.4 MG/DL (ref 0.1–1)
BUN SERPL-MCNC: 35 MG/DL (ref 8–23)
CALCIUM SERPL-MCNC: 9.7 MG/DL (ref 8.7–10.5)
CHLORIDE SERPL-SCNC: 107 MMOL/L (ref 95–110)
CHOLEST SERPL-MCNC: 175 MG/DL (ref 120–199)
CHOLEST/HDLC SERPL: 3.5 {RATIO} (ref 2–5)
CO2 SERPL-SCNC: 22 MMOL/L (ref 23–29)
CREAT SERPL-MCNC: 2.7 MG/DL (ref 0.5–1.4)
EST. GFR  (AFRICAN AMERICAN): 25 ML/MIN/1.73 M^2
EST. GFR  (NON AFRICAN AMERICAN): 22 ML/MIN/1.73 M^2
ESTIMATED AVG GLUCOSE: 131 MG/DL (ref 68–131)
GLUCOSE SERPL-MCNC: 118 MG/DL (ref 70–110)
HBA1C MFR BLD: 6.2 % (ref 4–5.6)
HDLC SERPL-MCNC: 50 MG/DL (ref 40–75)
HDLC SERPL: 28.6 % (ref 20–50)
LDLC SERPL CALC-MCNC: 107 MG/DL (ref 63–159)
NONHDLC SERPL-MCNC: 125 MG/DL
POTASSIUM SERPL-SCNC: 5.1 MMOL/L (ref 3.5–5.1)
PROT SERPL-MCNC: 7.3 G/DL (ref 6–8.4)
SODIUM SERPL-SCNC: 139 MMOL/L (ref 136–145)
TRIGL SERPL-MCNC: 90 MG/DL (ref 30–150)

## 2021-06-16 PROCEDURE — 80053 COMPREHEN METABOLIC PANEL: CPT | Performed by: FAMILY MEDICINE

## 2021-06-16 PROCEDURE — 86803 HEPATITIS C AB TEST: CPT | Performed by: FAMILY MEDICINE

## 2021-06-16 PROCEDURE — 36415 PR COLLECTION VENOUS BLOOD,VENIPUNCTURE: ICD-10-PCS | Mod: ,,, | Performed by: FAMILY MEDICINE

## 2021-06-16 PROCEDURE — 80061 LIPID PANEL: CPT | Performed by: FAMILY MEDICINE

## 2021-06-16 PROCEDURE — 83036 HEMOGLOBIN GLYCOSYLATED A1C: CPT | Performed by: FAMILY MEDICINE

## 2021-06-16 PROCEDURE — 36415 COLL VENOUS BLD VENIPUNCTURE: CPT | Mod: ,,, | Performed by: FAMILY MEDICINE

## 2021-06-17 LAB — HCV AB SERPL QL IA: NEGATIVE

## 2021-06-29 ENCOUNTER — OFFICE VISIT (OUTPATIENT)
Dept: FAMILY MEDICINE | Facility: CLINIC | Age: 76
End: 2021-06-29
Payer: MEDICARE

## 2021-06-29 VITALS
HEIGHT: 69 IN | HEART RATE: 54 BPM | TEMPERATURE: 99 F | DIASTOLIC BLOOD PRESSURE: 70 MMHG | SYSTOLIC BLOOD PRESSURE: 122 MMHG | OXYGEN SATURATION: 98 % | BODY MASS INDEX: 26.51 KG/M2 | WEIGHT: 179 LBS

## 2021-06-29 DIAGNOSIS — E08.21 DIABETIC NEPHROPATHY ASSOCIATED WITH DIABETES MELLITUS DUE TO UNDERLYING CONDITION: Primary | ICD-10-CM

## 2021-06-29 DIAGNOSIS — I10 ESSENTIAL HYPERTENSION: ICD-10-CM

## 2021-06-29 DIAGNOSIS — N13.8 BPH WITH OBSTRUCTION/LOWER URINARY TRACT SYMPTOMS: ICD-10-CM

## 2021-06-29 DIAGNOSIS — Z98.890 HISTORY OF CAROTID ENDARTERECTOMY: ICD-10-CM

## 2021-06-29 DIAGNOSIS — N40.1 BPH WITH OBSTRUCTION/LOWER URINARY TRACT SYMPTOMS: ICD-10-CM

## 2021-06-29 DIAGNOSIS — E78.2 MIXED HYPERLIPIDEMIA: ICD-10-CM

## 2021-06-29 DIAGNOSIS — N20.0 CALCULUS OF KIDNEY: ICD-10-CM

## 2021-06-29 PROBLEM — K35.33 ACUTE APPENDICITIS WITH PERFORATION, LOCALIZED PERITONITIS, AND ABSCESS, WITHOUT GANGRENE: Status: RESOLVED | Noted: 2020-05-30 | Resolved: 2021-06-29

## 2021-06-29 PROBLEM — N18.30 STAGE 3 CHRONIC KIDNEY DISEASE DUE TO TYPE 2 DIABETES MELLITUS: Status: ACTIVE | Noted: 2020-03-10

## 2021-06-29 PROBLEM — I65.29 CAROTID ATHEROSCLEROSIS: Status: ACTIVE | Noted: 2020-03-10

## 2021-06-29 PROBLEM — E78.5 HYPERLIPIDEMIA: Status: ACTIVE | Noted: 2021-06-29

## 2021-06-29 PROBLEM — E11.40 DIABETIC NEUROPATHY: Status: ACTIVE | Noted: 2019-02-10

## 2021-06-29 PROBLEM — E11.22 STAGE 3 CHRONIC KIDNEY DISEASE DUE TO TYPE 2 DIABETES MELLITUS: Status: ACTIVE | Noted: 2020-03-10

## 2021-06-29 PROCEDURE — 1101F PR PT FALLS ASSESS DOC 0-1 FALLS W/OUT INJ PAST YR: ICD-10-PCS | Mod: CPTII,S$GLB,, | Performed by: FAMILY MEDICINE

## 2021-06-29 PROCEDURE — 1159F PR MEDICATION LIST DOCUMENTED IN MEDICAL RECORD: ICD-10-PCS | Mod: S$GLB,,, | Performed by: FAMILY MEDICINE

## 2021-06-29 PROCEDURE — 1126F PR PAIN SEVERITY QUANTIFIED, NO PAIN PRESENT: ICD-10-PCS | Mod: S$GLB,,, | Performed by: FAMILY MEDICINE

## 2021-06-29 PROCEDURE — 99214 PR OFFICE/OUTPT VISIT, EST, LEVL IV, 30-39 MIN: ICD-10-PCS | Mod: S$GLB,,, | Performed by: FAMILY MEDICINE

## 2021-06-29 PROCEDURE — 3288F FALL RISK ASSESSMENT DOCD: CPT | Mod: CPTII,S$GLB,, | Performed by: FAMILY MEDICINE

## 2021-06-29 PROCEDURE — 1126F AMNT PAIN NOTED NONE PRSNT: CPT | Mod: S$GLB,,, | Performed by: FAMILY MEDICINE

## 2021-06-29 PROCEDURE — 1159F MED LIST DOCD IN RCRD: CPT | Mod: S$GLB,,, | Performed by: FAMILY MEDICINE

## 2021-06-29 PROCEDURE — 3288F PR FALLS RISK ASSESSMENT DOCUMENTED: ICD-10-PCS | Mod: CPTII,S$GLB,, | Performed by: FAMILY MEDICINE

## 2021-06-29 PROCEDURE — 1101F PT FALLS ASSESS-DOCD LE1/YR: CPT | Mod: CPTII,S$GLB,, | Performed by: FAMILY MEDICINE

## 2021-06-29 PROCEDURE — 99214 OFFICE O/P EST MOD 30 MIN: CPT | Mod: S$GLB,,, | Performed by: FAMILY MEDICINE

## 2021-06-29 RX ORDER — METOPROLOL SUCCINATE 25 MG/1
25 TABLET, EXTENDED RELEASE ORAL DAILY
COMMUNITY
Start: 2021-04-27 | End: 2023-07-25 | Stop reason: SDUPTHER

## 2021-06-29 RX ORDER — EZETIMIBE 10 MG/1
10 TABLET ORAL DAILY
Qty: 90 TABLET | Refills: 3 | Status: SHIPPED | OUTPATIENT
Start: 2021-06-29 | End: 2022-06-16 | Stop reason: SDUPTHER

## 2021-06-29 RX ORDER — GEMFIBROZIL 600 MG/1
600 TABLET, FILM COATED ORAL
Qty: 180 TABLET | Refills: 3 | Status: SHIPPED | OUTPATIENT
Start: 2021-06-29 | End: 2022-06-16 | Stop reason: SDUPTHER

## 2021-06-29 RX ORDER — TAMSULOSIN HYDROCHLORIDE 0.4 MG/1
0.4 CAPSULE ORAL NIGHTLY
Qty: 90 CAPSULE | Refills: 3 | Status: SHIPPED | OUTPATIENT
Start: 2021-06-29 | End: 2021-12-15

## 2021-06-30 ENCOUNTER — TELEPHONE (OUTPATIENT)
Dept: FAMILY MEDICINE | Facility: CLINIC | Age: 76
End: 2021-06-30

## 2021-07-16 ENCOUNTER — HOSPITAL ENCOUNTER (OUTPATIENT)
Dept: RADIOLOGY | Facility: HOSPITAL | Age: 76
Discharge: HOME | End: 2021-07-16
Attending: OTOLARYNGOLOGY
Payer: MEDICARE

## 2021-07-16 DIAGNOSIS — J32.4 CHRONIC PANSINUSITIS: Primary | ICD-10-CM

## 2021-07-16 DIAGNOSIS — J32.4 CHRONIC PANSINUSITIS: ICD-10-CM

## 2021-07-16 PROCEDURE — 70220 X-RAY EXAM OF SINUSES: CPT | Mod: TC,PO

## 2021-08-03 ENCOUNTER — LAB VISIT (OUTPATIENT)
Dept: LAB | Facility: CLINIC | Age: 76
End: 2021-08-03
Payer: MEDICARE

## 2021-08-03 DIAGNOSIS — N18.30 STAGE 3 CHRONIC KIDNEY DISEASE, UNSPECIFIED WHETHER STAGE 3A OR 3B CKD: ICD-10-CM

## 2021-08-03 LAB
25(OH)D3+25(OH)D2 SERPL-MCNC: 32 NG/ML (ref 30–96)
ALBUMIN SERPL BCP-MCNC: 4.1 G/DL (ref 3.5–5.2)
ANION GAP SERPL CALC-SCNC: 11 MMOL/L (ref 8–16)
BUN SERPL-MCNC: 28 MG/DL (ref 8–23)
CALCIUM SERPL-MCNC: 10.1 MG/DL (ref 8.7–10.5)
CHLORIDE SERPL-SCNC: 109 MMOL/L (ref 95–110)
CO2 SERPL-SCNC: 23 MMOL/L (ref 23–29)
CREAT SERPL-MCNC: 2.1 MG/DL (ref 0.5–1.4)
EST. GFR  (AFRICAN AMERICAN): 34 ML/MIN/1.73 M^2
EST. GFR  (NON AFRICAN AMERICAN): 29 ML/MIN/1.73 M^2
GLUCOSE SERPL-MCNC: 123 MG/DL (ref 70–110)
PHOSPHATE SERPL-MCNC: 3.7 MG/DL (ref 2.7–4.5)
POTASSIUM SERPL-SCNC: 5.4 MMOL/L (ref 3.5–5.1)
PTH-INTACT SERPL-MCNC: 40 PG/ML (ref 9–77)
SODIUM SERPL-SCNC: 143 MMOL/L (ref 136–145)

## 2021-08-03 PROCEDURE — 36415 PR COLLECTION VENOUS BLOOD,VENIPUNCTURE: ICD-10-PCS | Mod: ,,, | Performed by: INTERNAL MEDICINE

## 2021-08-03 PROCEDURE — 83970 ASSAY OF PARATHORMONE: CPT | Performed by: INTERNAL MEDICINE

## 2021-08-03 PROCEDURE — 82306 VITAMIN D 25 HYDROXY: CPT | Performed by: INTERNAL MEDICINE

## 2021-08-03 PROCEDURE — 80069 RENAL FUNCTION PANEL: CPT | Performed by: INTERNAL MEDICINE

## 2021-08-03 PROCEDURE — 36415 COLL VENOUS BLD VENIPUNCTURE: CPT | Mod: ,,, | Performed by: INTERNAL MEDICINE

## 2021-08-10 ENCOUNTER — OFFICE VISIT (OUTPATIENT)
Dept: NEPHROLOGY | Facility: CLINIC | Age: 76
End: 2021-08-10
Payer: MEDICARE

## 2021-08-10 VITALS — SYSTOLIC BLOOD PRESSURE: 117 MMHG | DIASTOLIC BLOOD PRESSURE: 66 MMHG

## 2021-08-10 DIAGNOSIS — I10 ESSENTIAL HYPERTENSION: ICD-10-CM

## 2021-08-10 DIAGNOSIS — N18.30 STAGE 3 CHRONIC KIDNEY DISEASE, UNSPECIFIED WHETHER STAGE 3A OR 3B CKD: Primary | ICD-10-CM

## 2021-08-10 DIAGNOSIS — E11.21 TYPE 2 DIABETES MELLITUS WITH DIABETIC NEPHROPATHY, WITHOUT LONG-TERM CURRENT USE OF INSULIN: ICD-10-CM

## 2021-08-10 DIAGNOSIS — N20.0 NEPHROLITHIASIS: ICD-10-CM

## 2021-08-10 DIAGNOSIS — N28.1 ACQUIRED CYST OF KIDNEY: ICD-10-CM

## 2021-08-10 PROCEDURE — 1159F MED LIST DOCD IN RCRD: CPT | Mod: CPTII,95,, | Performed by: INTERNAL MEDICINE

## 2021-08-10 PROCEDURE — 99214 PR OFFICE/OUTPT VISIT, EST, LEVL IV, 30-39 MIN: ICD-10-PCS | Mod: 95,,, | Performed by: INTERNAL MEDICINE

## 2021-08-10 PROCEDURE — 1159F PR MEDICATION LIST DOCUMENTED IN MEDICAL RECORD: ICD-10-PCS | Mod: CPTII,95,, | Performed by: INTERNAL MEDICINE

## 2021-08-10 PROCEDURE — 3078F DIAST BP <80 MM HG: CPT | Mod: CPTII,95,, | Performed by: INTERNAL MEDICINE

## 2021-08-10 PROCEDURE — 1160F RVW MEDS BY RX/DR IN RCRD: CPT | Mod: CPTII,95,, | Performed by: INTERNAL MEDICINE

## 2021-08-10 PROCEDURE — 3074F SYST BP LT 130 MM HG: CPT | Mod: CPTII,95,, | Performed by: INTERNAL MEDICINE

## 2021-08-10 PROCEDURE — 3078F PR MOST RECENT DIASTOLIC BLOOD PRESSURE < 80 MM HG: ICD-10-PCS | Mod: CPTII,95,, | Performed by: INTERNAL MEDICINE

## 2021-08-10 PROCEDURE — 3074F PR MOST RECENT SYSTOLIC BLOOD PRESSURE < 130 MM HG: ICD-10-PCS | Mod: CPTII,95,, | Performed by: INTERNAL MEDICINE

## 2021-08-10 PROCEDURE — 99214 OFFICE O/P EST MOD 30 MIN: CPT | Mod: 95,,, | Performed by: INTERNAL MEDICINE

## 2021-08-10 PROCEDURE — 1160F PR REVIEW ALL MEDS BY PRESCRIBER/CLIN PHARMACIST DOCUMENTED: ICD-10-PCS | Mod: CPTII,95,, | Performed by: INTERNAL MEDICINE

## 2021-08-12 ENCOUNTER — TELEPHONE (OUTPATIENT)
Dept: NEPHROLOGY | Facility: CLINIC | Age: 76
End: 2021-08-12

## 2021-09-28 ENCOUNTER — OFFICE VISIT (OUTPATIENT)
Dept: FAMILY MEDICINE | Facility: CLINIC | Age: 76
End: 2021-09-28
Payer: MEDICARE

## 2021-09-28 ENCOUNTER — LAB VISIT (OUTPATIENT)
Dept: LAB | Facility: CLINIC | Age: 76
End: 2021-09-28
Payer: MEDICARE

## 2021-09-28 VITALS
HEART RATE: 56 BPM | HEIGHT: 69 IN | SYSTOLIC BLOOD PRESSURE: 122 MMHG | OXYGEN SATURATION: 97 % | WEIGHT: 180 LBS | TEMPERATURE: 98 F | BODY MASS INDEX: 26.66 KG/M2 | DIASTOLIC BLOOD PRESSURE: 80 MMHG

## 2021-09-28 DIAGNOSIS — I10 ESSENTIAL HYPERTENSION: ICD-10-CM

## 2021-09-28 DIAGNOSIS — E11.42 DIABETIC POLYNEUROPATHY ASSOCIATED WITH TYPE 2 DIABETES MELLITUS: Primary | ICD-10-CM

## 2021-09-28 DIAGNOSIS — E11.42 DIABETIC POLYNEUROPATHY ASSOCIATED WITH TYPE 2 DIABETES MELLITUS: ICD-10-CM

## 2021-09-28 DIAGNOSIS — N18.4 CKD (CHRONIC KIDNEY DISEASE) STAGE 4, GFR 15-29 ML/MIN: ICD-10-CM

## 2021-09-28 DIAGNOSIS — M25.512 LEFT SHOULDER PAIN, UNSPECIFIED CHRONICITY: Primary | ICD-10-CM

## 2021-09-28 LAB
ESTIMATED AVG GLUCOSE: 126 MG/DL (ref 68–131)
HBA1C MFR BLD: 6 % (ref 4–5.6)

## 2021-09-28 PROCEDURE — 3074F PR MOST RECENT SYSTOLIC BLOOD PRESSURE < 130 MM HG: ICD-10-PCS | Mod: CPTII,S$GLB,, | Performed by: FAMILY MEDICINE

## 2021-09-28 PROCEDURE — 3288F PR FALLS RISK ASSESSMENT DOCUMENTED: ICD-10-PCS | Mod: CPTII,S$GLB,, | Performed by: FAMILY MEDICINE

## 2021-09-28 PROCEDURE — 36415 COLL VENOUS BLD VENIPUNCTURE: CPT | Mod: ,,, | Performed by: FAMILY MEDICINE

## 2021-09-28 PROCEDURE — 36415 PR COLLECTION VENOUS BLOOD,VENIPUNCTURE: ICD-10-PCS | Mod: ,,, | Performed by: FAMILY MEDICINE

## 2021-09-28 PROCEDURE — 3288F FALL RISK ASSESSMENT DOCD: CPT | Mod: CPTII,S$GLB,, | Performed by: FAMILY MEDICINE

## 2021-09-28 PROCEDURE — 1101F PR PT FALLS ASSESS DOC 0-1 FALLS W/OUT INJ PAST YR: ICD-10-PCS | Mod: CPTII,S$GLB,, | Performed by: FAMILY MEDICINE

## 2021-09-28 PROCEDURE — 99214 PR OFFICE/OUTPT VISIT, EST, LEVL IV, 30-39 MIN: ICD-10-PCS | Mod: S$GLB,,, | Performed by: FAMILY MEDICINE

## 2021-09-28 PROCEDURE — 1126F PR PAIN SEVERITY QUANTIFIED, NO PAIN PRESENT: ICD-10-PCS | Mod: CPTII,S$GLB,, | Performed by: FAMILY MEDICINE

## 2021-09-28 PROCEDURE — 3079F DIAST BP 80-89 MM HG: CPT | Mod: CPTII,S$GLB,, | Performed by: FAMILY MEDICINE

## 2021-09-28 PROCEDURE — 3074F SYST BP LT 130 MM HG: CPT | Mod: CPTII,S$GLB,, | Performed by: FAMILY MEDICINE

## 2021-09-28 PROCEDURE — 1101F PT FALLS ASSESS-DOCD LE1/YR: CPT | Mod: CPTII,S$GLB,, | Performed by: FAMILY MEDICINE

## 2021-09-28 PROCEDURE — 1126F AMNT PAIN NOTED NONE PRSNT: CPT | Mod: CPTII,S$GLB,, | Performed by: FAMILY MEDICINE

## 2021-09-28 PROCEDURE — 1159F MED LIST DOCD IN RCRD: CPT | Mod: CPTII,S$GLB,, | Performed by: FAMILY MEDICINE

## 2021-09-28 PROCEDURE — 83036 HEMOGLOBIN GLYCOSYLATED A1C: CPT | Performed by: FAMILY MEDICINE

## 2021-09-28 PROCEDURE — 1159F PR MEDICATION LIST DOCUMENTED IN MEDICAL RECORD: ICD-10-PCS | Mod: CPTII,S$GLB,, | Performed by: FAMILY MEDICINE

## 2021-09-28 PROCEDURE — 3079F PR MOST RECENT DIASTOLIC BLOOD PRESSURE 80-89 MM HG: ICD-10-PCS | Mod: CPTII,S$GLB,, | Performed by: FAMILY MEDICINE

## 2021-09-28 PROCEDURE — 99214 OFFICE O/P EST MOD 30 MIN: CPT | Mod: S$GLB,,, | Performed by: FAMILY MEDICINE

## 2021-09-28 RX ORDER — AMLODIPINE BESYLATE 5 MG/1
5 TABLET ORAL DAILY
Qty: 90 TABLET | Refills: 0
Start: 2021-09-28 | End: 2022-07-08

## 2021-09-28 RX ORDER — PIOGLITAZONEHYDROCHLORIDE 15 MG/1
15 TABLET ORAL DAILY
Qty: 90 TABLET | Refills: 3 | Status: SHIPPED | OUTPATIENT
Start: 2021-09-28 | End: 2022-09-20 | Stop reason: SDUPTHER

## 2021-10-04 ENCOUNTER — OFFICE VISIT (OUTPATIENT)
Dept: ORTHOPEDICS | Facility: CLINIC | Age: 76
End: 2021-10-04
Payer: MEDICARE

## 2021-10-04 ENCOUNTER — HOSPITAL ENCOUNTER (OUTPATIENT)
Dept: RADIOLOGY | Facility: HOSPITAL | Age: 76
Discharge: HOME | End: 2021-10-04
Attending: ORTHOPAEDIC SURGERY
Payer: MEDICARE

## 2021-10-04 VITALS — BODY MASS INDEX: 26.66 KG/M2 | WEIGHT: 180 LBS | HEIGHT: 69 IN | RESPIRATION RATE: 16 BRPM

## 2021-10-04 DIAGNOSIS — M25.512 LEFT SHOULDER PAIN, UNSPECIFIED CHRONICITY: ICD-10-CM

## 2021-10-04 DIAGNOSIS — M25.512 LEFT SHOULDER PAIN, UNSPECIFIED CHRONICITY: Primary | ICD-10-CM

## 2021-10-04 DIAGNOSIS — M75.100 ROTATOR CUFF SYNDROME, UNSPECIFIED LATERALITY: ICD-10-CM

## 2021-10-04 PROCEDURE — 20610 LARGE JOINT ASPIRATION/INJECTION: L SUBACROMIAL BURSA: ICD-10-PCS | Mod: LT,S$GLB,, | Performed by: ORTHOPAEDIC SURGERY

## 2021-10-04 PROCEDURE — 1159F MED LIST DOCD IN RCRD: CPT | Mod: CPTII,S$GLB,, | Performed by: ORTHOPAEDIC SURGERY

## 2021-10-04 PROCEDURE — 99203 PR OFFICE/OUTPT VISIT, NEW, LEVL III, 30-44 MIN: ICD-10-PCS | Mod: 25,S$GLB,, | Performed by: ORTHOPAEDIC SURGERY

## 2021-10-04 PROCEDURE — 73030 XR SHOULDER TRAUMA 3 VIEW LEFT: ICD-10-PCS | Mod: 26,LT,, | Performed by: RADIOLOGY

## 2021-10-04 PROCEDURE — 1126F AMNT PAIN NOTED NONE PRSNT: CPT | Mod: CPTII,S$GLB,, | Performed by: ORTHOPAEDIC SURGERY

## 2021-10-04 PROCEDURE — 1160F RVW MEDS BY RX/DR IN RCRD: CPT | Mod: CPTII,S$GLB,, | Performed by: ORTHOPAEDIC SURGERY

## 2021-10-04 PROCEDURE — 99999 PR PBB SHADOW E&M-EST. PATIENT-LVL IV: ICD-10-PCS | Mod: PBBFAC,,, | Performed by: ORTHOPAEDIC SURGERY

## 2021-10-04 PROCEDURE — 1126F PR PAIN SEVERITY QUANTIFIED, NO PAIN PRESENT: ICD-10-PCS | Mod: CPTII,S$GLB,, | Performed by: ORTHOPAEDIC SURGERY

## 2021-10-04 PROCEDURE — 73030 X-RAY EXAM OF SHOULDER: CPT | Mod: 26,LT,, | Performed by: RADIOLOGY

## 2021-10-04 PROCEDURE — 1101F PT FALLS ASSESS-DOCD LE1/YR: CPT | Mod: CPTII,S$GLB,, | Performed by: ORTHOPAEDIC SURGERY

## 2021-10-04 PROCEDURE — 99999 PR PBB SHADOW E&M-EST. PATIENT-LVL IV: CPT | Mod: PBBFAC,,, | Performed by: ORTHOPAEDIC SURGERY

## 2021-10-04 PROCEDURE — 1101F PR PT FALLS ASSESS DOC 0-1 FALLS W/OUT INJ PAST YR: ICD-10-PCS | Mod: CPTII,S$GLB,, | Performed by: ORTHOPAEDIC SURGERY

## 2021-10-04 PROCEDURE — 3288F PR FALLS RISK ASSESSMENT DOCUMENTED: ICD-10-PCS | Mod: CPTII,S$GLB,, | Performed by: ORTHOPAEDIC SURGERY

## 2021-10-04 PROCEDURE — 99203 OFFICE O/P NEW LOW 30 MIN: CPT | Mod: 25,S$GLB,, | Performed by: ORTHOPAEDIC SURGERY

## 2021-10-04 PROCEDURE — 73030 X-RAY EXAM OF SHOULDER: CPT | Mod: TC,PN,LT

## 2021-10-04 PROCEDURE — 20610 DRAIN/INJ JOINT/BURSA W/O US: CPT | Mod: LT,S$GLB,, | Performed by: ORTHOPAEDIC SURGERY

## 2021-10-04 PROCEDURE — 1159F PR MEDICATION LIST DOCUMENTED IN MEDICAL RECORD: ICD-10-PCS | Mod: CPTII,S$GLB,, | Performed by: ORTHOPAEDIC SURGERY

## 2021-10-04 PROCEDURE — 1160F PR REVIEW ALL MEDS BY PRESCRIBER/CLIN PHARMACIST DOCUMENTED: ICD-10-PCS | Mod: CPTII,S$GLB,, | Performed by: ORTHOPAEDIC SURGERY

## 2021-10-04 PROCEDURE — 3288F FALL RISK ASSESSMENT DOCD: CPT | Mod: CPTII,S$GLB,, | Performed by: ORTHOPAEDIC SURGERY

## 2021-10-04 RX ORDER — TRIAMCINOLONE ACETONIDE 40 MG/ML
40 INJECTION, SUSPENSION INTRA-ARTICULAR; INTRAMUSCULAR
Status: DISCONTINUED | OUTPATIENT
Start: 2021-10-04 | End: 2021-10-04 | Stop reason: HOSPADM

## 2021-10-04 RX ADMIN — TRIAMCINOLONE ACETONIDE 40 MG: 40 INJECTION, SUSPENSION INTRA-ARTICULAR; INTRAMUSCULAR at 01:10

## 2021-10-05 ENCOUNTER — TELEPHONE (OUTPATIENT)
Dept: FAMILY MEDICINE | Facility: CLINIC | Age: 76
End: 2021-10-05

## 2021-11-24 ENCOUNTER — TELEPHONE (OUTPATIENT)
Dept: NEPHROLOGY | Facility: CLINIC | Age: 76
End: 2021-11-24

## 2021-11-24 ENCOUNTER — TELEPHONE (OUTPATIENT)
Dept: FAMILY MEDICINE | Facility: CLINIC | Age: 76
End: 2021-11-24
Payer: MEDICARE

## 2021-11-24 NOTE — TELEPHONE ENCOUNTER
----- Message from Fausto Calloway sent at 11/24/2021 10:43 AM CST -----  Contact: Andreia  Who Called: PT  Regarding: The pt's wife is calling to request that the pt's appointment be changed to a virtual appointment. I checked the schedule and the provider doesn't have any openings and she states that the provider can just change the currently scheduled appointment to virtual. Please contact the pt for scheduling.   Would the patient rather a call back or a response via [facility]? Call back  Best Call Back Number: [phone]  Additional Information:

## 2021-12-13 ENCOUNTER — LAB VISIT (OUTPATIENT)
Dept: LAB | Facility: CLINIC | Age: 76
End: 2021-12-13
Payer: MEDICARE

## 2021-12-13 DIAGNOSIS — N18.30 STAGE 3 CHRONIC KIDNEY DISEASE, UNSPECIFIED WHETHER STAGE 3A OR 3B CKD: ICD-10-CM

## 2021-12-13 LAB
ALBUMIN SERPL BCP-MCNC: 4 G/DL (ref 3.5–5.2)
ANION GAP SERPL CALC-SCNC: 16 MMOL/L (ref 8–16)
BUN SERPL-MCNC: 37 MG/DL (ref 8–23)
CALCIUM SERPL-MCNC: 9.9 MG/DL (ref 8.7–10.5)
CHLORIDE SERPL-SCNC: 106 MMOL/L (ref 95–110)
CO2 SERPL-SCNC: 21 MMOL/L (ref 23–29)
CREAT SERPL-MCNC: 2.2 MG/DL (ref 0.5–1.4)
EST. GFR  (AFRICAN AMERICAN): 32 ML/MIN/1.73 M^2
EST. GFR  (NON AFRICAN AMERICAN): 28 ML/MIN/1.73 M^2
GLUCOSE SERPL-MCNC: 146 MG/DL (ref 70–110)
PHOSPHATE SERPL-MCNC: 3.1 MG/DL (ref 2.7–4.5)
POTASSIUM SERPL-SCNC: 5 MMOL/L (ref 3.5–5.1)
PTH-INTACT SERPL-MCNC: 31.4 PG/ML (ref 9–77)
SODIUM SERPL-SCNC: 143 MMOL/L (ref 136–145)

## 2021-12-13 PROCEDURE — 80069 RENAL FUNCTION PANEL: CPT | Performed by: INTERNAL MEDICINE

## 2021-12-13 PROCEDURE — 83970 ASSAY OF PARATHORMONE: CPT | Performed by: INTERNAL MEDICINE

## 2021-12-15 ENCOUNTER — OFFICE VISIT (OUTPATIENT)
Dept: NEPHROLOGY | Facility: CLINIC | Age: 76
End: 2021-12-15
Payer: MEDICARE

## 2021-12-15 VITALS — DIASTOLIC BLOOD PRESSURE: 66 MMHG | SYSTOLIC BLOOD PRESSURE: 127 MMHG

## 2021-12-15 DIAGNOSIS — E11.21 TYPE 2 DIABETES MELLITUS WITH DIABETIC NEPHROPATHY, WITHOUT LONG-TERM CURRENT USE OF INSULIN: ICD-10-CM

## 2021-12-15 DIAGNOSIS — N28.1 ACQUIRED CYST OF KIDNEY: ICD-10-CM

## 2021-12-15 DIAGNOSIS — N20.0 NEPHROLITHIASIS: ICD-10-CM

## 2021-12-15 DIAGNOSIS — N18.30 STAGE 3 CHRONIC KIDNEY DISEASE, UNSPECIFIED WHETHER STAGE 3A OR 3B CKD: Primary | ICD-10-CM

## 2021-12-15 DIAGNOSIS — I10 ESSENTIAL HYPERTENSION: ICD-10-CM

## 2021-12-15 PROCEDURE — 99499 UNLISTED E&M SERVICE: CPT | Mod: 95,,, | Performed by: INTERNAL MEDICINE

## 2021-12-15 PROCEDURE — 99499 NO LOS: ICD-10-PCS | Mod: 95,,, | Performed by: INTERNAL MEDICINE

## 2021-12-16 ENCOUNTER — OFFICE VISIT (OUTPATIENT)
Dept: FAMILY MEDICINE | Facility: CLINIC | Age: 76
End: 2021-12-16
Payer: MEDICARE

## 2021-12-16 VITALS
BODY MASS INDEX: 27.25 KG/M2 | HEART RATE: 64 BPM | DIASTOLIC BLOOD PRESSURE: 82 MMHG | HEIGHT: 69 IN | TEMPERATURE: 98 F | WEIGHT: 184 LBS | OXYGEN SATURATION: 98 % | SYSTOLIC BLOOD PRESSURE: 120 MMHG

## 2021-12-16 DIAGNOSIS — E11.8 CONTROLLED TYPE 2 DIABETES MELLITUS WITH COMPLICATION, WITHOUT LONG-TERM CURRENT USE OF INSULIN: Primary | ICD-10-CM

## 2021-12-16 DIAGNOSIS — N18.30 STAGE 3 CHRONIC KIDNEY DISEASE DUE TO TYPE 2 DIABETES MELLITUS: ICD-10-CM

## 2021-12-16 DIAGNOSIS — I10 ESSENTIAL HYPERTENSION: ICD-10-CM

## 2021-12-16 DIAGNOSIS — E11.22 STAGE 3 CHRONIC KIDNEY DISEASE DUE TO TYPE 2 DIABETES MELLITUS: ICD-10-CM

## 2021-12-16 DIAGNOSIS — E08.21 DIABETIC NEPHROPATHY ASSOCIATED WITH DIABETES MELLITUS DUE TO UNDERLYING CONDITION: ICD-10-CM

## 2021-12-16 DIAGNOSIS — E78.5 HYPERLIPIDEMIA, UNSPECIFIED HYPERLIPIDEMIA TYPE: ICD-10-CM

## 2021-12-16 LAB — GLUCOSE SERPL-MCNC: 139 MG/DL (ref 70–110)

## 2021-12-16 PROCEDURE — 99214 OFFICE O/P EST MOD 30 MIN: CPT | Mod: S$GLB,,, | Performed by: FAMILY MEDICINE

## 2021-12-16 PROCEDURE — 99999 PR PBB SHADOW E&M-EST. PATIENT-LVL III: CPT | Mod: PBBFAC,,, | Performed by: FAMILY MEDICINE

## 2021-12-16 PROCEDURE — 82962 GLUCOSE BLOOD TEST: CPT | Mod: S$GLB,,, | Performed by: FAMILY MEDICINE

## 2021-12-16 PROCEDURE — 82962 POCT GLUCOSE, HAND-HELD DEVICE: ICD-10-PCS | Mod: S$GLB,,, | Performed by: FAMILY MEDICINE

## 2021-12-16 PROCEDURE — 99214 PR OFFICE/OUTPT VISIT, EST, LEVL IV, 30-39 MIN: ICD-10-PCS | Mod: S$GLB,,, | Performed by: FAMILY MEDICINE

## 2021-12-16 PROCEDURE — 99999 PR PBB SHADOW E&M-EST. PATIENT-LVL III: ICD-10-PCS | Mod: PBBFAC,,, | Performed by: FAMILY MEDICINE

## 2021-12-16 RX ORDER — TAMSULOSIN HYDROCHLORIDE 0.4 MG/1
CAPSULE ORAL DAILY
COMMUNITY
End: 2022-03-23

## 2022-02-24 ENCOUNTER — TELEPHONE (OUTPATIENT)
Dept: NEPHROLOGY | Facility: CLINIC | Age: 77
End: 2022-02-24
Payer: MEDICARE

## 2022-03-16 ENCOUNTER — LAB VISIT (OUTPATIENT)
Dept: LAB | Facility: CLINIC | Age: 77
End: 2022-03-16
Payer: MEDICARE

## 2022-03-16 DIAGNOSIS — N18.30 STAGE 3 CHRONIC KIDNEY DISEASE, UNSPECIFIED WHETHER STAGE 3A OR 3B CKD: ICD-10-CM

## 2022-03-16 DIAGNOSIS — E08.21 DIABETIC NEPHROPATHY ASSOCIATED WITH DIABETES MELLITUS DUE TO UNDERLYING CONDITION: ICD-10-CM

## 2022-03-16 LAB
CREAT UR-MCNC: 138.9 MG/DL (ref 23–375)
ESTIMATED AVG GLUCOSE: 143 MG/DL (ref 68–131)
HBA1C MFR BLD: 6.6 % (ref 4–5.6)
PROT UR-MCNC: 26 MG/DL (ref 0–15)
PROT/CREAT UR: 0.19 MG/G{CREAT} (ref 0–0.2)

## 2022-03-16 PROCEDURE — 36415 PR COLLECTION VENOUS BLOOD,VENIPUNCTURE: ICD-10-PCS | Mod: PN,,, | Performed by: FAMILY MEDICINE

## 2022-03-16 PROCEDURE — 82570 ASSAY OF URINE CREATININE: CPT | Performed by: INTERNAL MEDICINE

## 2022-03-16 PROCEDURE — 36415 COLL VENOUS BLD VENIPUNCTURE: CPT | Mod: PN,,, | Performed by: FAMILY MEDICINE

## 2022-03-16 PROCEDURE — 83036 HEMOGLOBIN GLYCOSYLATED A1C: CPT | Performed by: FAMILY MEDICINE

## 2022-03-18 ENCOUNTER — TELEPHONE (OUTPATIENT)
Dept: NEPHROLOGY | Facility: CLINIC | Age: 77
End: 2022-03-18
Payer: MEDICARE

## 2022-03-23 ENCOUNTER — OFFICE VISIT (OUTPATIENT)
Dept: NEPHROLOGY | Facility: CLINIC | Age: 77
End: 2022-03-23
Payer: MEDICARE

## 2022-03-23 DIAGNOSIS — N18.30 STAGE 3 CHRONIC KIDNEY DISEASE, UNSPECIFIED WHETHER STAGE 3A OR 3B CKD: Primary | ICD-10-CM

## 2022-03-23 DIAGNOSIS — N20.0 NEPHROLITHIASIS: ICD-10-CM

## 2022-03-23 DIAGNOSIS — N28.1 ACQUIRED CYST OF KIDNEY: ICD-10-CM

## 2022-03-23 DIAGNOSIS — I10 ESSENTIAL HYPERTENSION: ICD-10-CM

## 2022-03-23 DIAGNOSIS — E11.21 TYPE 2 DIABETES MELLITUS WITH DIABETIC NEPHROPATHY, WITHOUT LONG-TERM CURRENT USE OF INSULIN: ICD-10-CM

## 2022-03-23 PROCEDURE — 1159F MED LIST DOCD IN RCRD: CPT | Mod: CPTII,95,, | Performed by: INTERNAL MEDICINE

## 2022-03-23 PROCEDURE — 1159F PR MEDICATION LIST DOCUMENTED IN MEDICAL RECORD: ICD-10-PCS | Mod: CPTII,95,, | Performed by: INTERNAL MEDICINE

## 2022-03-23 PROCEDURE — 99213 PR OFFICE/OUTPT VISIT, EST, LEVL III, 20-29 MIN: ICD-10-PCS | Mod: 95,,, | Performed by: INTERNAL MEDICINE

## 2022-03-23 PROCEDURE — 99213 OFFICE O/P EST LOW 20 MIN: CPT | Mod: 95,,, | Performed by: INTERNAL MEDICINE

## 2022-03-23 PROCEDURE — 1160F PR REVIEW ALL MEDS BY PRESCRIBER/CLIN PHARMACIST DOCUMENTED: ICD-10-PCS | Mod: CPTII,95,, | Performed by: INTERNAL MEDICINE

## 2022-03-23 PROCEDURE — 1160F RVW MEDS BY RX/DR IN RCRD: CPT | Mod: CPTII,95,, | Performed by: INTERNAL MEDICINE

## 2022-03-23 RX ORDER — TAMSULOSIN HYDROCHLORIDE 0.4 MG/1
0.4 CAPSULE ORAL DAILY
Qty: 90 CAPSULE | Refills: 1 | Status: SHIPPED | OUTPATIENT
Start: 2022-03-23 | End: 2022-07-08

## 2022-03-23 NOTE — PROGRESS NOTES
Subjective:       Patient ID: Ruy Ramos Jr. is a 77 y.o. White male who presents for return patient evaluation for chronic renal failure.    The patient location is:  Patient Home   The chief complaint leading to consultation is: ckd  Visit type: Virtual visit with synchronous audio and video  Total time spent with patient: 15 minutes  Each patient to whom he or she provides medical services by telemedicine is:  (1) informed of the relationship between the physician and patient and the respective role of any other health care provider with respect to management of the patient; and (2) notified that he or she may decline to receive medical services by telemedicine and may withdraw from such care at any time.       He has no uremic or urinary symptoms and is in his usual state of health.   He had COVID in August on 2020.  His blood glucose has been controlled and stays below 150.   He sees [facility] in July.      Review of Systems   Constitutional: Positive for fatigue. Negative for appetite change, chills and fever.   HENT: Negative for congestion.    Eyes: Positive for visual disturbance.   Respiratory: Positive for shortness of breath (with exertion). Negative for cough.    Cardiovascular: Negative for chest pain and leg swelling.   Gastrointestinal: Negative for abdominal pain, diarrhea, nausea and vomiting.   Genitourinary: Negative for difficulty urinating, dysuria and hematuria.   Musculoskeletal: Positive for arthralgias (B shoulders). Negative for myalgias.   Skin: Negative for rash.   Neurological: Positive for numbness (feet). Negative for dizziness and headaches.   Psychiatric/Behavioral: Negative for sleep disturbance.       The past medical, family and social histories were reviewed for this encounter.     There were no vitals taken for this visit.    Objective:      Physical Exam  Vitals reviewed.   Constitutional:       General: He is not in acute distress.     Appearance: He is well-developed.    HENT:      Head: Normocephalic and atraumatic.   Eyes:      General: No scleral icterus.  Pulmonary:      Effort: Pulmonary effort is normal. No respiratory distress.   Neurological:      Mental Status: He is alert and oriented to person, place, and time.   Psychiatric:         Mood and Affect: Mood normal.         Behavior: Behavior normal.         Assessment:       1. Stage 3 chronic kidney disease, unspecified whether stage 3a or 3b CKD    2. Essential hypertension    3. Type 2 diabetes mellitus with diabetic nephropathy, without long-term current use of insulin    4. Acquired cyst of kidney    5. Nephrolithiasis        Plan:   Return to clinic in 3 months.  Labs for next visit include rp, pth, upc uric us cbc.    Baseline creatinine is 1.5-1.7 prior to 2020.  He has a new baseline since his COVID infection in August of 2020 of 2.1-2.2.  PTH is 38 with a calcium of 9.6.  Renal US shows R 10.9 cm L 11.9 cm.  He sees Urology in [place].  UPC is 0.28.  Blood pressure is controlled on the current regimen.  Continue current medications as prescribed and reviewed.   Please have patient follow-up with your PCP or Endocrinology regarding the control and management of diabetes.  We discussed his potassium.  I did email him handouts regarding potassium and diet.  Please avoid or minimize all NSAIDS (ibuprofen, motrin, aleve, indocin, naprosyn) to minimize the risk to your kidneys.  Aspirin in a dose of 325 mg or less a day is likely the safest with regards to kidney function.  If you are able to take aspirin and do not have any bleeding problems or ulcers, this may be your best therapy.  Alternatively, acetaminophen (Tylenol) is the safer than NSAIDs with regards to kidney function.  I would ask you take this as directed on the bottle.  It is best to stay under 2 grams a day (4-500 mg tablets a day maximum).

## 2022-03-24 ENCOUNTER — OFFICE VISIT (OUTPATIENT)
Dept: FAMILY MEDICINE | Facility: CLINIC | Age: 77
End: 2022-03-24
Payer: MEDICARE

## 2022-03-24 VITALS
HEART RATE: 60 BPM | HEIGHT: 69 IN | OXYGEN SATURATION: 94 % | WEIGHT: 183 LBS | TEMPERATURE: 99 F | BODY MASS INDEX: 27.11 KG/M2 | SYSTOLIC BLOOD PRESSURE: 110 MMHG | DIASTOLIC BLOOD PRESSURE: 80 MMHG

## 2022-03-24 DIAGNOSIS — Z12.5 SCREENING FOR MALIGNANT NEOPLASM OF PROSTATE: ICD-10-CM

## 2022-03-24 DIAGNOSIS — I77.9 CAROTID ARTERY DISEASE, UNSPECIFIED LATERALITY, UNSPECIFIED TYPE: ICD-10-CM

## 2022-03-24 DIAGNOSIS — I10 ESSENTIAL HYPERTENSION: Primary | ICD-10-CM

## 2022-03-24 DIAGNOSIS — E08.21 DIABETIC NEPHROPATHY ASSOCIATED WITH DIABETES MELLITUS DUE TO UNDERLYING CONDITION: ICD-10-CM

## 2022-03-24 LAB — GLUCOSE SERPL-MCNC: 143 MG/DL (ref 70–110)

## 2022-03-24 PROCEDURE — 82962 POCT GLUCOSE, HAND-HELD DEVICE: ICD-10-PCS | Mod: S$GLB,,, | Performed by: FAMILY MEDICINE

## 2022-03-24 PROCEDURE — 3079F PR MOST RECENT DIASTOLIC BLOOD PRESSURE 80-89 MM HG: ICD-10-PCS | Mod: CPTII,S$GLB,, | Performed by: FAMILY MEDICINE

## 2022-03-24 PROCEDURE — 99999 PR PBB SHADOW E&M-EST. PATIENT-LVL V: CPT | Mod: PBBFAC,,, | Performed by: FAMILY MEDICINE

## 2022-03-24 PROCEDURE — 1159F MED LIST DOCD IN RCRD: CPT | Mod: CPTII,S$GLB,, | Performed by: FAMILY MEDICINE

## 2022-03-24 PROCEDURE — 3074F PR MOST RECENT SYSTOLIC BLOOD PRESSURE < 130 MM HG: ICD-10-PCS | Mod: CPTII,S$GLB,, | Performed by: FAMILY MEDICINE

## 2022-03-24 PROCEDURE — 99214 PR OFFICE/OUTPT VISIT, EST, LEVL IV, 30-39 MIN: ICD-10-PCS | Mod: S$GLB,,, | Performed by: FAMILY MEDICINE

## 2022-03-24 PROCEDURE — 3079F DIAST BP 80-89 MM HG: CPT | Mod: CPTII,S$GLB,, | Performed by: FAMILY MEDICINE

## 2022-03-24 PROCEDURE — 3288F PR FALLS RISK ASSESSMENT DOCUMENTED: ICD-10-PCS | Mod: CPTII,S$GLB,, | Performed by: FAMILY MEDICINE

## 2022-03-24 PROCEDURE — 1101F PT FALLS ASSESS-DOCD LE1/YR: CPT | Mod: CPTII,S$GLB,, | Performed by: FAMILY MEDICINE

## 2022-03-24 PROCEDURE — 3074F SYST BP LT 130 MM HG: CPT | Mod: CPTII,S$GLB,, | Performed by: FAMILY MEDICINE

## 2022-03-24 PROCEDURE — 1125F AMNT PAIN NOTED PAIN PRSNT: CPT | Mod: CPTII,S$GLB,, | Performed by: FAMILY MEDICINE

## 2022-03-24 PROCEDURE — 99214 OFFICE O/P EST MOD 30 MIN: CPT | Mod: S$GLB,,, | Performed by: FAMILY MEDICINE

## 2022-03-24 PROCEDURE — 99999 PR PBB SHADOW E&M-EST. PATIENT-LVL V: ICD-10-PCS | Mod: PBBFAC,,, | Performed by: FAMILY MEDICINE

## 2022-03-24 PROCEDURE — 3288F FALL RISK ASSESSMENT DOCD: CPT | Mod: CPTII,S$GLB,, | Performed by: FAMILY MEDICINE

## 2022-03-24 PROCEDURE — 1125F PR PAIN SEVERITY QUANTIFIED, PAIN PRESENT: ICD-10-PCS | Mod: CPTII,S$GLB,, | Performed by: FAMILY MEDICINE

## 2022-03-24 PROCEDURE — 1101F PR PT FALLS ASSESS DOC 0-1 FALLS W/OUT INJ PAST YR: ICD-10-PCS | Mod: CPTII,S$GLB,, | Performed by: FAMILY MEDICINE

## 2022-03-24 PROCEDURE — 82962 GLUCOSE BLOOD TEST: CPT | Mod: S$GLB,,, | Performed by: FAMILY MEDICINE

## 2022-03-24 PROCEDURE — 1159F PR MEDICATION LIST DOCUMENTED IN MEDICAL RECORD: ICD-10-PCS | Mod: CPTII,S$GLB,, | Performed by: FAMILY MEDICINE

## 2022-03-24 RX ORDER — CYANOCOBALAMIN/FOLIC AC/VIT B6 0.5-2.2-25
1 TABLET ORAL DAILY
Qty: 90 EACH | Refills: 3 | Status: SHIPPED | OUTPATIENT
Start: 2022-03-24 | End: 2023-04-04 | Stop reason: SDUPTHER

## 2022-03-24 RX ORDER — PRAVASTATIN SODIUM 10 MG/1
10 TABLET ORAL DAILY
Qty: 90 TABLET | Refills: 3 | Status: SHIPPED | OUTPATIENT
Start: 2022-03-24 | End: 2022-12-13 | Stop reason: SDUPTHER

## 2022-03-24 NOTE — PROGRESS NOTES
SUBJECTIVE:    Patient ID: Ruy Ramos Jr. is a 77 y.o. male.    Chief Complaint: Follow-up and Diabetes (Pt here for dm check up/See a1c)    Follow-up on this 77-year-old male with diabetes and hypertension and CKD 3.  Patient recently saw his nephrologist.  His creatinine baseline is now 2.1-2.2.  Blood pressure today is well controlled.  Postprandial glucose is 143.  He does need prescription refills.    Patient does have BPH and he has increased nocturia to 4-5 times a night..  He stopped his tamsulosin a couple months ago.  He recently had it refilled.  He is instructed to resume his tamsulosin q.h.s..    Hemoglobin A1c is 6.6%.  Last hemoglobin A1c was 6.0%.        Office Visit on 03/24/2022   Component Date Value Ref Range Status    POC Glucose 03/24/2022 143 (A) 70 - 110 MG/DL Final   Lab Visit on 03/16/2022   Component Date Value Ref Range Status    Hemoglobin A1C 03/16/2022 6.6 (A) 4.0 - 5.6 % Final    Estimated Avg Glucose 03/16/2022 143 (A) 68 - 131 mg/dL Final   Lab Visit on 03/16/2022   Component Date Value Ref Range Status    Glucose 03/16/2022 130 (A) 70 - 110 mg/dL Final    Sodium 03/16/2022 139  136 - 145 mmol/L Final    Potassium 03/16/2022 4.9  3.5 - 5.1 mmol/L Final    Chloride 03/16/2022 106  95 - 110 mmol/L Final    CO2 03/16/2022 25  23 - 29 mmol/L Final    BUN 03/16/2022 35 (A) 8 - 23 mg/dL Final    Calcium 03/16/2022 9.6  8.7 - 10.5 mg/dL Final    Creatinine 03/16/2022 2.4 (A) 0.5 - 1.4 mg/dL Final    Albumin 03/16/2022 3.9  3.5 - 5.2 g/dL Final    Phosphorus 03/16/2022 3.3  2.7 - 4.5 mg/dL Final    eGFR if  03/16/2022 29 (A) >60 mL/min/1.73 m^2 Final    eGFR if non  03/16/2022 25 (A) >60 mL/min/1.73 m^2 Final    Anion Gap 03/16/2022 8  8 - 16 mmol/L Final    PTH, Intact 03/16/2022 38.1  9.0 - 77.0 pg/mL Final   Lab Visit on 03/16/2022   Component Date Value Ref Range Status    Protein, Urine Random 03/16/2022 26 (A) 0 - 15 mg/dL  Final    Creatinine, Urine 03/16/2022 138.9  23.0 - 375.0 mg/dL Final    Prot/Creat Ratio, Urine 03/16/2022 0.19  0.00 - 0.20 Final   Office Visit on 12/16/2021   Component Date Value Ref Range Status    POC Glucose 12/16/2021 139 (A) 70 - 110 MG/DL Final   Lab Visit on 12/13/2021   Component Date Value Ref Range Status    Protein, Urine Random 12/13/2021 24 (A) 0 - 15 mg/dL Final    Creatinine, Urine 12/13/2021 81.7  23.0 - 375.0 mg/dL Final    Prot/Creat Ratio, Urine 12/13/2021 0.29 (A) 0.00 - 0.20 Final   Lab Visit on 12/13/2021   Component Date Value Ref Range Status    Glucose 12/13/2021 146 (A) 70 - 110 mg/dL Final    Sodium 12/13/2021 143  136 - 145 mmol/L Final    Potassium 12/13/2021 5.0  3.5 - 5.1 mmol/L Final    Chloride 12/13/2021 106  95 - 110 mmol/L Final    CO2 12/13/2021 21 (A) 23 - 29 mmol/L Final    BUN 12/13/2021 37 (A) 8 - 23 mg/dL Final    Calcium 12/13/2021 9.9  8.7 - 10.5 mg/dL Final    Creatinine 12/13/2021 2.2 (A) 0.5 - 1.4 mg/dL Final    Albumin 12/13/2021 4.0  3.5 - 5.2 g/dL Final    Phosphorus 12/13/2021 3.1  2.7 - 4.5 mg/dL Final    eGFR if  12/13/2021 32 (A) >60 mL/min/1.73 m^2 Final    eGFR if non  12/13/2021 28 (A) >60 mL/min/1.73 m^2 Final    Anion Gap 12/13/2021 16  8 - 16 mmol/L Final    PTH, Intact 12/13/2021 31.4  9.0 - 77.0 pg/mL Final   Lab Visit on 09/28/2021   Component Date Value Ref Range Status    Hemoglobin A1C 09/28/2021 6.0 (A) 4.0 - 5.6 % Final    Estimated Avg Glucose 09/28/2021 126  68 - 131 mg/dL Final       Past Medical History:   Diagnosis Date    Diabetes mellitus, type 2     Hypertension     Kidney stone      Social History     Socioeconomic History    Marital status:    Tobacco Use    Smoking status: Never Smoker    Smokeless tobacco: Never Used   Substance and Sexual Activity    Alcohol use: No    Drug use: No     Past Surgical History:   Procedure Laterality Date    APPENDECTOMY  5/30/2020     Procedure: APPENDECTOMY;  Surgeon: Eusebio Gil III, MD;  Location: [facility] OR;  Service: General;;    arm fracture surgery      FRACTURE SURGERY      Left arm    LAPAROSCOPIC APPENDECTOMY N/A 5/30/2020    Procedure: APPENDECTOMY, LAPAROSCOPIC VS OPEN;  Surgeon: Eusebio Gil III, MD;  Location: [facility] OR;  Service: General;  Laterality: N/A;    Open Appendectomy w/ partial cecectomy   05/31/2020    Dr. Gil      No family history on file.    Review of patient's allergies indicates:  No Known Allergies    Current Outpatient Medications:     ACCU-CHEK AUDREY PLUS METER Misc, , Disp: , Rfl:     ACCU-CHEK AUDREY PLUS TEST STRP Strp, , Disp: , Rfl:     ACCU-CHEK SOFTCLIX LANCETS Misc, , Disp: , Rfl:     amLODIPine (NORVASC) 5 MG tablet, Take 1 tablet (5 mg total) by mouth once daily., Disp: 90 tablet, Rfl: 0    ezetimibe (ZETIA) 10 mg tablet, Take 1 tablet (10 mg total) by mouth once daily., Disp: 90 tablet, Rfl: 3    gemfibroziL (LOPID) 600 MG tablet, Take 1 tablet (600 mg total) by mouth 2 (two) times daily before meals., Disp: 180 tablet, Rfl: 3    metoprolol succinate (TOPROL-XL) 25 MG 24 hr tablet, Take 25 mg by mouth once daily., Disp: , Rfl:     MULTIVITAMIN ORAL, Take 1 tablet by mouth once daily. , Disp: , Rfl:     omega 3-dha-epa-fish oil 900-1,400 mg CpDR, Take 2 capsules by mouth 2 (two) times daily. , Disp: , Rfl:     pioglitazone (ACTOS) 15 MG tablet, Take 1 tablet (15 mg total) by mouth once daily., Disp: 90 tablet, Rfl: 3    tamsulosin (FLOMAX) 0.4 mg Cap, Take 1 capsule (0.4 mg total) by mouth once daily., Disp: 90 capsule, Rfl: 1    telmisartan (MICARDIS) 40 MG Tab, Take 40 mg by mouth once daily., Disp: , Rfl:     vitamin D (VITAMIN D3) 1000 units Tab, Take 1,000 Units by mouth once daily., Disp: , Rfl:     aspirin (ECOTRIN) 81 MG EC tablet, Take 81 mg by mouth once daily., Disp: , Rfl:     canagliflozin (INVOKANA) 300 mg Tab tablet, Take 1 tablet (300 mg total) by mouth  "once daily., Disp: 90 tablet, Rfl: 3    folic acid-vit B6-vit B12 (FOLPLEX 2.2) 2.2-25-0.5 mg Tab, Take 1 tablet by mouth once daily., Disp: 90 each, Rfl: 3    L. acidophilus/L. rhamnosus (DIGESTIVE HEALTH PROBIOTIC ORAL), Take 1 tablet by mouth once daily., Disp: , Rfl:     pravastatin (PRAVACHOL) 10 MG tablet, Take 1 tablet (10 mg total) by mouth once daily., Disp: 90 tablet, Rfl: 3    Review of Systems   Constitutional:        Patient does have BPH and he has increased nocturia to 4-5 times a night.  No dysuria.  No incontinence.  Increased frequency.     All other systems reviewed and are negative.          Objective:      Vitals:    03/24/22 0959   BP: 110/80   Pulse: 60   Temp: 98.5 °F (36.9 °C)   SpO2: (!) 94%   Weight: 83 kg (183 lb)   Height: 5' 9" (1.753 m)     Physical Exam  Constitutional:       Comments: ENT is unremarkable.  Neck is without adenopathy.  There are no carotid bruits.  Lungs clear to auscultation.  Heart is regular rate and rhythm without murmurs.  No pretibial edema is appreciated.             Assessment:       1. Essential hypertension    2. Diabetic nephropathy associated with diabetes mellitus due to underlying condition    3. Carotid artery disease, unspecified laterality, unspecified type    4. Screening for malignant neoplasm of prostate         Plan:       Essential hypertension    Diabetic nephropathy associated with diabetes mellitus due to underlying condition  -     canagliflozin (INVOKANA) 300 mg Tab tablet; Take 1 tablet (300 mg total) by mouth once daily.  Dispense: 90 tablet; Refill: 3  -     POCT Glucose, Hand-Held Device; Standing  -     Hemoglobin A1C; Future; Expected date: 06/22/2022  -     POCT Glucose, Hand-Held Device    Carotid artery disease, unspecified laterality, unspecified type  -     pravastatin (PRAVACHOL) 10 MG tablet; Take 1 tablet (10 mg total) by mouth once daily.  Dispense: 90 tablet; Refill: 3    Screening for malignant neoplasm of prostate  -     " PSA, Screening; Future; Expected date: 06/22/2022    Other orders  -     folic acid-vit B6-vit B12 (FOLPLEX 2.2) 2.2-25-0.5 mg Tab; Take 1 tablet by mouth once daily.  Dispense: 90 each; Refill: 3    Restart Tamsulosin at bedtime  Continue diet  Increase activity.  Return for hemoglobin A1c in 3 months.    Follow up in about 3 months (around 6/24/2022).        3/24/2022 Kanu Medeiros M.D.

## 2022-06-03 ENCOUNTER — LAB VISIT (OUTPATIENT)
Dept: LAB | Facility: CLINIC | Age: 77
End: 2022-06-03
Payer: MEDICARE

## 2022-06-03 DIAGNOSIS — E08.21 DIABETIC NEPHROPATHY ASSOCIATED WITH DIABETES MELLITUS DUE TO UNDERLYING CONDITION: ICD-10-CM

## 2022-06-03 DIAGNOSIS — Z12.5 SCREENING FOR MALIGNANT NEOPLASM OF PROSTATE: ICD-10-CM

## 2022-06-03 LAB
COMPLEXED PSA SERPL-MCNC: 0.75 NG/ML (ref 0–4)
ESTIMATED AVG GLUCOSE: 137 MG/DL (ref 68–131)
HBA1C MFR BLD: 6.4 % (ref 4–5.6)

## 2022-06-03 PROCEDURE — 36415 COLL VENOUS BLD VENIPUNCTURE: CPT | Mod: PN,,, | Performed by: STUDENT IN AN ORGANIZED HEALTH CARE EDUCATION/TRAINING PROGRAM

## 2022-06-03 PROCEDURE — 84153 ASSAY OF PSA TOTAL: CPT | Performed by: FAMILY MEDICINE

## 2022-06-03 PROCEDURE — 83036 HEMOGLOBIN GLYCOSYLATED A1C: CPT | Performed by: FAMILY MEDICINE

## 2022-06-03 PROCEDURE — 36415 PR COLLECTION VENOUS BLOOD,VENIPUNCTURE: ICD-10-PCS | Mod: PN,,, | Performed by: STUDENT IN AN ORGANIZED HEALTH CARE EDUCATION/TRAINING PROGRAM

## 2022-06-09 ENCOUNTER — TELEPHONE (OUTPATIENT)
Dept: NEPHROLOGY | Facility: CLINIC | Age: 77
End: 2022-06-09
Payer: MEDICARE

## 2022-06-16 ENCOUNTER — OFFICE VISIT (OUTPATIENT)
Dept: FAMILY MEDICINE | Facility: CLINIC | Age: 77
End: 2022-06-16
Payer: MEDICARE

## 2022-06-16 ENCOUNTER — LAB VISIT (OUTPATIENT)
Dept: LAB | Facility: CLINIC | Age: 77
End: 2022-06-16
Payer: MEDICARE

## 2022-06-16 VITALS
HEART RATE: 68 BPM | WEIGHT: 185 LBS | HEIGHT: 69 IN | DIASTOLIC BLOOD PRESSURE: 60 MMHG | OXYGEN SATURATION: 97 % | BODY MASS INDEX: 27.4 KG/M2 | TEMPERATURE: 98 F | SYSTOLIC BLOOD PRESSURE: 100 MMHG

## 2022-06-16 DIAGNOSIS — E78.2 MIXED HYPERLIPIDEMIA: ICD-10-CM

## 2022-06-16 DIAGNOSIS — I10 ESSENTIAL HYPERTENSION: ICD-10-CM

## 2022-06-16 DIAGNOSIS — E08.21 DIABETIC NEPHROPATHY ASSOCIATED WITH DIABETES MELLITUS DUE TO UNDERLYING CONDITION: Primary | ICD-10-CM

## 2022-06-16 DIAGNOSIS — H10.10 ALLERGIC CONJUNCTIVITIS, UNSPECIFIED LATERALITY: ICD-10-CM

## 2022-06-16 DIAGNOSIS — Z98.890 HISTORY OF CAROTID ENDARTERECTOMY: ICD-10-CM

## 2022-06-16 LAB
CHOLEST SERPL-MCNC: 194 MG/DL (ref 120–199)
CHOLEST/HDLC SERPL: 4.6 {RATIO} (ref 2–5)
HDLC SERPL-MCNC: 42 MG/DL (ref 40–75)
HDLC SERPL: 21.6 % (ref 20–50)
LDLC SERPL CALC-MCNC: 123.6 MG/DL (ref 63–159)
NONHDLC SERPL-MCNC: 152 MG/DL
TRIGL SERPL-MCNC: 142 MG/DL (ref 30–150)

## 2022-06-16 PROCEDURE — 3074F SYST BP LT 130 MM HG: CPT | Mod: CPTII,S$GLB,, | Performed by: FAMILY MEDICINE

## 2022-06-16 PROCEDURE — 99999 PR PBB SHADOW E&M-EST. PATIENT-LVL IV: CPT | Mod: PBBFAC,,, | Performed by: FAMILY MEDICINE

## 2022-06-16 PROCEDURE — 3074F PR MOST RECENT SYSTOLIC BLOOD PRESSURE < 130 MM HG: ICD-10-PCS | Mod: CPTII,S$GLB,, | Performed by: FAMILY MEDICINE

## 2022-06-16 PROCEDURE — 3078F PR MOST RECENT DIASTOLIC BLOOD PRESSURE < 80 MM HG: ICD-10-PCS | Mod: CPTII,S$GLB,, | Performed by: FAMILY MEDICINE

## 2022-06-16 PROCEDURE — 1101F PR PT FALLS ASSESS DOC 0-1 FALLS W/OUT INJ PAST YR: ICD-10-PCS | Mod: CPTII,S$GLB,, | Performed by: FAMILY MEDICINE

## 2022-06-16 PROCEDURE — 3078F DIAST BP <80 MM HG: CPT | Mod: CPTII,S$GLB,, | Performed by: FAMILY MEDICINE

## 2022-06-16 PROCEDURE — 80061 LIPID PANEL: CPT | Performed by: FAMILY MEDICINE

## 2022-06-16 PROCEDURE — 1159F PR MEDICATION LIST DOCUMENTED IN MEDICAL RECORD: ICD-10-PCS | Mod: CPTII,S$GLB,, | Performed by: FAMILY MEDICINE

## 2022-06-16 PROCEDURE — 1126F PR PAIN SEVERITY QUANTIFIED, NO PAIN PRESENT: ICD-10-PCS | Mod: CPTII,S$GLB,, | Performed by: FAMILY MEDICINE

## 2022-06-16 PROCEDURE — 1126F AMNT PAIN NOTED NONE PRSNT: CPT | Mod: CPTII,S$GLB,, | Performed by: FAMILY MEDICINE

## 2022-06-16 PROCEDURE — 36415 COLL VENOUS BLD VENIPUNCTURE: CPT | Mod: PN,,, | Performed by: STUDENT IN AN ORGANIZED HEALTH CARE EDUCATION/TRAINING PROGRAM

## 2022-06-16 PROCEDURE — 36415 PR COLLECTION VENOUS BLOOD,VENIPUNCTURE: ICD-10-PCS | Mod: PN,,, | Performed by: STUDENT IN AN ORGANIZED HEALTH CARE EDUCATION/TRAINING PROGRAM

## 2022-06-16 PROCEDURE — 99214 PR OFFICE/OUTPT VISIT, EST, LEVL IV, 30-39 MIN: ICD-10-PCS | Mod: S$GLB,,, | Performed by: FAMILY MEDICINE

## 2022-06-16 PROCEDURE — 1101F PT FALLS ASSESS-DOCD LE1/YR: CPT | Mod: CPTII,S$GLB,, | Performed by: FAMILY MEDICINE

## 2022-06-16 PROCEDURE — 3288F FALL RISK ASSESSMENT DOCD: CPT | Mod: CPTII,S$GLB,, | Performed by: FAMILY MEDICINE

## 2022-06-16 PROCEDURE — 99214 OFFICE O/P EST MOD 30 MIN: CPT | Mod: S$GLB,,, | Performed by: FAMILY MEDICINE

## 2022-06-16 PROCEDURE — 1159F MED LIST DOCD IN RCRD: CPT | Mod: CPTII,S$GLB,, | Performed by: FAMILY MEDICINE

## 2022-06-16 PROCEDURE — 99999 PR PBB SHADOW E&M-EST. PATIENT-LVL IV: ICD-10-PCS | Mod: PBBFAC,,, | Performed by: FAMILY MEDICINE

## 2022-06-16 PROCEDURE — 3288F PR FALLS RISK ASSESSMENT DOCUMENTED: ICD-10-PCS | Mod: CPTII,S$GLB,, | Performed by: FAMILY MEDICINE

## 2022-06-16 RX ORDER — OLOPATADINE HYDROCHLORIDE 2 MG/ML
1 SOLUTION/ DROPS OPHTHALMIC DAILY
Qty: 5 ML | Refills: 4 | Status: SHIPPED | OUTPATIENT
Start: 2022-06-16 | End: 2022-09-20

## 2022-06-16 RX ORDER — GEMFIBROZIL 600 MG/1
600 TABLET, FILM COATED ORAL
Qty: 180 TABLET | Refills: 3 | Status: SHIPPED | OUTPATIENT
Start: 2022-06-16 | End: 2023-05-05

## 2022-06-16 RX ORDER — EZETIMIBE 10 MG/1
10 TABLET ORAL DAILY
Qty: 90 TABLET | Refills: 3 | Status: SHIPPED | OUTPATIENT
Start: 2022-06-16 | End: 2023-07-25 | Stop reason: SDUPTHER

## 2022-06-16 NOTE — PROGRESS NOTES
SUBJECTIVE:    Patient ID: Ruy Ramos Jr. is a 77 y.o. male.    Chief Complaint: Follow-up (Pt has appt.in 2 weeks with dr Crisostomo) and Diabetes (C/o dizziness [id number]/C/o constant sinus trouble/Pt states his blood sugar was 126,2weeks ago)    Follow-up on this 77-year-old male with diabetes and hypertension and CKD 3.    Blood pressure today is the patient's pressure 100.  He does relate significant symptoms orthostasis.      He does need prescription refills.      Hemoglobin A1c is 6.4%  Last hemoglobin A1c was 6.6%.  Restarted tamsulosin after last visit for nocturia.  This has resulted in a slight improvement in his nocturia.  He reports 3 times a night.        Lab Visit on 06/03/2022   Component Date Value Ref Range Status    WBC 06/03/2022 6.61  3.90 - 12.70 K/uL Final    RBC 06/03/2022 4.38 (A) 4.60 - 6.20 M/uL Final    Hemoglobin 06/03/2022 13.6 (A) 14.0 - 18.0 g/dL Final    Hematocrit 06/03/2022 41.6  40.0 - 54.0 % Final    MCV 06/03/2022 95  82 - 98 fL Final    MCH 06/03/2022 31.1 (A) 27.0 - 31.0 pg Final    MCHC 06/03/2022 32.7  32.0 - 36.0 g/dL Final    RDW 06/03/2022 14.6 (A) 11.5 - 14.5 % Final    Platelets 06/03/2022 205  150 - 450 K/uL Final    MPV 06/03/2022 11.4  9.2 - 12.9 fL Final    Immature Granulocytes 06/03/2022 0.9 (A) 0.0 - 0.5 % Final    Gran # (ANC) 06/03/2022 4.0  1.8 - 7.7 K/uL Final    Immature Grans (Abs) 06/03/2022 0.06 (A) 0.00 - 0.04 K/uL Final    Lymph # 06/03/2022 1.7  1.0 - 4.8 K/uL Final    Mono # 06/03/2022 0.6  0.3 - 1.0 K/uL Final    Eos # 06/03/2022 0.2  0.0 - 0.5 K/uL Final    Baso # 06/03/2022 0.06  0.00 - 0.20 K/uL Final    nRBC 06/03/2022 0  0 /100 WBC Final    Gran % 06/03/2022 59.8  38.0 - 73.0 % Final    Lymph % 06/03/2022 26.3  18.0 - 48.0 % Final    Mono % 06/03/2022 9.7  4.0 - 15.0 % Final    Eosinophil % 06/03/2022 2.4  0.0 - 8.0 % Final    Basophil % 06/03/2022 0.9  0.0 - 1.9 % Final    Differential Method 06/03/2022  Automated   Final    Glucose 06/03/2022 122 (A) 70 - 110 mg/dL Final    Sodium 06/03/2022 136  136 - 145 mmol/L Final    Potassium 06/03/2022 5.0  3.5 - 5.1 mmol/L Final    Chloride 06/03/2022 106  95 - 110 mmol/L Final    CO2 06/03/2022 19 (A) 23 - 29 mmol/L Final    BUN 06/03/2022 33 (A) 8 - 23 mg/dL Final    Calcium 06/03/2022 9.4  8.7 - 10.5 mg/dL Final    Creatinine 06/03/2022 2.2 (A) 0.5 - 1.4 mg/dL Final    Albumin 06/03/2022 3.8  3.5 - 5.2 g/dL Final    Phosphorus 06/03/2022 3.0  2.7 - 4.5 mg/dL Final    eGFR if  06/03/2022 32 (A) >60 mL/min/1.73 m^2 Final    eGFR if non African American 06/03/2022 27 (A) >60 mL/min/1.73 m^2 Final    Anion Gap 06/03/2022 11  8 - 16 mmol/L Final    PTH, Intact 06/03/2022 36.8  9.0 - 77.0 pg/mL Final    Uric Acid 06/03/2022 4.6  3.4 - 7.0 mg/dL Final   Lab Visit on 06/03/2022   Component Date Value Ref Range Status    Protein, Urine Random 06/03/2022 32 (A) 0 - 15 mg/dL Final    Creatinine, Urine 06/03/2022 165.0  23.0 - 375.0 mg/dL Final    Prot/Creat Ratio, Urine 06/03/2022 0.19  0.00 - 0.20 Final   Lab Visit on 06/03/2022   Component Date Value Ref Range Status    PSA, Screen 06/03/2022 0.75  0.00 - 4.00 ng/mL Final    Hemoglobin A1C 06/03/2022 6.4 (A) 4.0 - 5.6 % Final    Estimated Avg Glucose 06/03/2022 137 (A) 68 - 131 mg/dL Final   Office Visit on 03/24/2022   Component Date Value Ref Range Status    POC Glucose 03/24/2022 143 (A) 70 - 110 MG/DL Final   Lab Visit on 03/16/2022   Component Date Value Ref Range Status    Hemoglobin A1C 03/16/2022 6.6 (A) 4.0 - 5.6 % Final    Estimated Avg Glucose 03/16/2022 143 (A) 68 - 131 mg/dL Final   Lab Visit on 03/16/2022   Component Date Value Ref Range Status    Glucose 03/16/2022 130 (A) 70 - 110 mg/dL Final    Sodium 03/16/2022 139  136 - 145 mmol/L Final    Potassium 03/16/2022 4.9  3.5 - 5.1 mmol/L Final    Chloride 03/16/2022 106  95 - 110 mmol/L Final    CO2 03/16/2022 25  23 - 29  mmol/L Final    BUN 03/16/2022 35 (A) 8 - 23 mg/dL Final    Calcium 03/16/2022 9.6  8.7 - 10.5 mg/dL Final    Creatinine 03/16/2022 2.4 (A) 0.5 - 1.4 mg/dL Final    Albumin 03/16/2022 3.9  3.5 - 5.2 g/dL Final    Phosphorus 03/16/2022 3.3  2.7 - 4.5 mg/dL Final    eGFR if  03/16/2022 29 (A) >60 mL/min/1.73 m^2 Final    eGFR if non  03/16/2022 25 (A) >60 mL/min/1.73 m^2 Final    Anion Gap 03/16/2022 8  8 - 16 mmol/L Final    PTH, Intact 03/16/2022 38.1  9.0 - 77.0 pg/mL Final   Lab Visit on 03/16/2022   Component Date Value Ref Range Status    Protein, Urine Random 03/16/2022 26 (A) 0 - 15 mg/dL Final    Creatinine, Urine 03/16/2022 138.9  23.0 - 375.0 mg/dL Final    Prot/Creat Ratio, Urine 03/16/2022 0.19  0.00 - 0.20 Final       Past Medical History:   Diagnosis Date    Diabetes mellitus, type 2     Hypertension     Kidney stone      Social History     Socioeconomic History    Marital status:    Tobacco Use    Smoking status: Never Smoker    Smokeless tobacco: Never Used   Substance and Sexual Activity    Alcohol use: No    Drug use: No     Past Surgical History:   Procedure Laterality Date    APPENDECTOMY  5/30/2020    Procedure: APPENDECTOMY;  Surgeon: Eusebio Gil III, MD;  Location: [facility] OR;  Service: General;;    arm fracture surgery      FRACTURE SURGERY      Left arm    LAPAROSCOPIC APPENDECTOMY N/A 5/30/2020    Procedure: APPENDECTOMY, LAPAROSCOPIC VS OPEN;  Surgeon: Eusebio Gil III, MD;  Location: [facility] OR;  Service: General;  Laterality: N/A;    Open Appendectomy w/ partial cecectomy   05/31/2020    Dr. Gil      No family history on file.    Review of patient's allergies indicates:  No Known Allergies    Current Outpatient Medications:     ACCU-CHEK AUDREY PLUS METER Misc, , Disp: , Rfl:     ACCU-CHEK AUDREY PLUS TEST STRP Strp, , Disp: , Rfl:     ACCU-CHEK SOFTCLIX LANCETS Misc, , Disp: , Rfl:     amLODIPine (NORVASC) 5 MG  tablet, Take 1 tablet (5 mg total) by mouth once daily., Disp: 90 tablet, Rfl: 0    aspirin (ECOTRIN) 81 MG EC tablet, Take 81 mg by mouth once daily., Disp: , Rfl:     canagliflozin (INVOKANA) 300 mg Tab tablet, Take 1 tablet (300 mg total) by mouth once daily., Disp: 90 tablet, Rfl: 3    folic acid-vit B6-vit B12 (FOLPLEX 2.2) 2.2-25-0.5 mg Tab, Take 1 tablet by mouth once daily., Disp: 90 each, Rfl: 3    L. acidophilus/L. rhamnosus (DIGESTIVE HEALTH PROBIOTIC ORAL), Take 1 tablet by mouth once daily., Disp: , Rfl:     metoprolol succinate (TOPROL-XL) 25 MG 24 hr tablet, Take 25 mg by mouth once daily., Disp: , Rfl:     MULTIVITAMIN ORAL, Take 1 tablet by mouth once daily. , Disp: , Rfl:     omega 3-dha-epa-fish oil 900-1,400 mg CpDR, Take 2 capsules by mouth 2 (two) times daily. , Disp: , Rfl:     pioglitazone (ACTOS) 15 MG tablet, Take 1 tablet (15 mg total) by mouth once daily., Disp: 90 tablet, Rfl: 3    pravastatin (PRAVACHOL) 10 MG tablet, Take 1 tablet (10 mg total) by mouth once daily., Disp: 90 tablet, Rfl: 3    tamsulosin (FLOMAX) 0.4 mg Cap, Take 1 capsule (0.4 mg total) by mouth once daily., Disp: 90 capsule, Rfl: 1    telmisartan (MICARDIS) 40 MG Tab, Take 40 mg by mouth once daily., Disp: , Rfl:     vitamin D (VITAMIN D3) 1000 units Tab, Take 1,000 Units by mouth once daily., Disp: , Rfl:     ezetimibe (ZETIA) 10 mg tablet, Take 1 tablet (10 mg total) by mouth once daily., Disp: 90 tablet, Rfl: 3    gemfibroziL (LOPID) 600 MG tablet, Take 1 tablet (600 mg total) by mouth 2 (two) times daily before meals., Disp: 180 tablet, Rfl: 3    olopatadine (PATADAY) 0.2 % Drop, Place 1 drop into both eyes once daily., Disp: 5 mL, Rfl: 4    Review of Systems   Constitutional:        Patient reports decreased nocturia to 3 times a night.  No dysuria.  No incontinence.  Increased frequency.     All other systems reviewed and are negative.          Objective:      Vitals:    06/16/22 1106   BP: 100/60  "  Pulse: 68   Temp: 98.4 °F (36.9 °C)   SpO2: 97%   Weight: 83.9 kg (185 lb)   Height: 5' 9" (1.753 m)     Physical Exam  Nursing note reviewed.   Constitutional:       Comments:     Neck is without adenopathy.  There are no carotid bruits.  Lungs clear to auscultation.  Heart is regular rate and rhythm without murmurs.  No pretibial edema is appreciated.             Assessment:       1. Diabetic nephropathy associated with diabetes mellitus due to underlying condition    2. History of carotid endarterectomy    3. Mixed hyperlipidemia    4. Essential hypertension    5. Allergic conjunctivitis, unspecified laterality         Plan:       Diabetic nephropathy associated with diabetes mellitus due to underlying condition  -     Hemoglobin A1C; Future; Expected date: 09/30/2022    History of carotid endarterectomy  -     gemfibroziL (LOPID) 600 MG tablet; Take 1 tablet (600 mg total) by mouth 2 (two) times daily before meals.  Dispense: 180 tablet; Refill: 3  -     ezetimibe (ZETIA) 10 mg tablet; Take 1 tablet (10 mg total) by mouth once daily.  Dispense: 90 tablet; Refill: 3    Mixed hyperlipidemia  -     gemfibroziL (LOPID) 600 MG tablet; Take 1 tablet (600 mg total) by mouth 2 (two) times daily before meals.  Dispense: 180 tablet; Refill: 3  -     ezetimibe (ZETIA) 10 mg tablet; Take 1 tablet (10 mg total) by mouth once daily.  Dispense: 90 tablet; Refill: 3  -     Lipid Panel; Future; Expected date: 06/16/2022    Essential hypertension    Allergic conjunctivitis, unspecified laterality  -     olopatadine (PATADAY) 0.2 % Drop; Place 1 drop into both eyes once daily.  Dispense: 5 mL; Refill: 4      Follow up in about 3 months (around 9/16/2022).      Decrease amlodipine 5 mg to 1/2 a day x6 days and then discontinue.  Follow-up with Dr. Crisostomo in 2 weeks and he can evaluate results.  Obtain fasting lipid profile and take results to Dr. Crisostomo  6/16/2022 Kanu Medeiros M.D.      "

## 2022-06-16 NOTE — PATIENT INSTRUCTIONS
Decrease amlodipine 5 mg to 1/2 a day x6 days and then discontinue.  Follow-up with Dr. Crisostomo in 2 weeks and he can evaluate results.  Obtain fasting lipid profile and take results to Dr. Crisostomo

## 2022-06-21 ENCOUNTER — TELEPHONE (OUTPATIENT)
Dept: FAMILY MEDICINE | Facility: CLINIC | Age: 77
End: 2022-06-21
Payer: MEDICARE

## 2022-06-21 NOTE — TELEPHONE ENCOUNTER
[facility] lipid results were normal. Pt can view on his [facility] Mychart or call back for any questions.        ----- Message from aKnu Medeiros MD sent at 6/20/2022  4:19 PM CDT -----  Choesterol levels are all within desired range

## 2022-07-08 ENCOUNTER — OFFICE VISIT (OUTPATIENT)
Dept: NEPHROLOGY | Facility: CLINIC | Age: 77
End: 2022-07-08
Payer: MEDICARE

## 2022-07-08 DIAGNOSIS — N18.30 STAGE 3 CHRONIC KIDNEY DISEASE, UNSPECIFIED WHETHER STAGE 3A OR 3B CKD: Primary | ICD-10-CM

## 2022-07-08 DIAGNOSIS — N20.0 NEPHROLITHIASIS: ICD-10-CM

## 2022-07-08 DIAGNOSIS — E11.21 TYPE 2 DIABETES MELLITUS WITH DIABETIC NEPHROPATHY, WITHOUT LONG-TERM CURRENT USE OF INSULIN: ICD-10-CM

## 2022-07-08 DIAGNOSIS — I10 ESSENTIAL HYPERTENSION: ICD-10-CM

## 2022-07-08 DIAGNOSIS — N28.1 ACQUIRED CYST OF KIDNEY: ICD-10-CM

## 2022-07-08 PROCEDURE — 1159F PR MEDICATION LIST DOCUMENTED IN MEDICAL RECORD: ICD-10-PCS | Mod: CPTII,95,, | Performed by: INTERNAL MEDICINE

## 2022-07-08 PROCEDURE — 1160F PR REVIEW ALL MEDS BY PRESCRIBER/CLIN PHARMACIST DOCUMENTED: ICD-10-PCS | Mod: CPTII,95,, | Performed by: INTERNAL MEDICINE

## 2022-07-08 PROCEDURE — 1159F MED LIST DOCD IN RCRD: CPT | Mod: CPTII,95,, | Performed by: INTERNAL MEDICINE

## 2022-07-08 PROCEDURE — 99214 PR OFFICE/OUTPT VISIT, EST, LEVL IV, 30-39 MIN: ICD-10-PCS | Mod: 95,,, | Performed by: INTERNAL MEDICINE

## 2022-07-08 PROCEDURE — 99214 OFFICE O/P EST MOD 30 MIN: CPT | Mod: 95,,, | Performed by: INTERNAL MEDICINE

## 2022-07-08 PROCEDURE — 1160F RVW MEDS BY RX/DR IN RCRD: CPT | Mod: CPTII,95,, | Performed by: INTERNAL MEDICINE

## 2022-07-08 NOTE — PROGRESS NOTES
Subjective:       Patient ID: Ruy Ramos Jr. is a 77 y.o. White male who presents for return patient evaluation for chronic renal failure.    The patient location is:  Patient Home   The chief complaint leading to consultation is: ckd  Visit type: Virtual visit with synchronous audio and video  Total time spent with patient: 15 minutes  Each patient to whom he or she provides medical services by telemedicine is:  (1) informed of the relationship between the physician and patient and the respective role of any other health care provider with respect to management of the patient; and (2) notified that he or she may decline to receive medical services by telemedicine and may withdraw from such care at any time.       He has no uremic or urinary symptoms and is in his usual state of health.   He had COVID in August on 2020.  His blood glucose has been controlled and stays below 150.   He had a couple of his medications stopped (amlodipine and tamsulosin due to dizziness).  His dizziness is improved now and his blood pressure is controlled.      Review of Systems   Constitutional: Positive for fatigue. Negative for appetite change, chills and fever.   HENT: Negative for congestion.    Eyes: Positive for visual disturbance.   Respiratory: Positive for shortness of breath (with exertion). Negative for cough.    Cardiovascular: Negative for chest pain and leg swelling.   Gastrointestinal: Negative for abdominal pain, diarrhea, nausea and vomiting.   Genitourinary: Negative for difficulty urinating, dysuria and hematuria.   Musculoskeletal: Positive for arthralgias (B shoulders). Negative for myalgias.   Skin: Negative for rash.   Neurological: Positive for numbness (feet). Negative for dizziness and headaches.   Psychiatric/Behavioral: Negative for sleep disturbance.       The past medical, family and social histories were reviewed for this encounter.     There were no vitals taken for this visit.    Objective:       Physical Exam  Vitals reviewed.   Constitutional:       General: He is not in acute distress.     Appearance: He is well-developed.   HENT:      Head: Normocephalic and atraumatic.   Eyes:      General: No scleral icterus.  Pulmonary:      Effort: Pulmonary effort is normal. No respiratory distress.   Neurological:      Mental Status: He is alert and oriented to person, place, and time.   Psychiatric:         Mood and Affect: Mood normal.         Behavior: Behavior normal.         Assessment:       1. Stage 3 chronic kidney disease, unspecified whether stage 3a or 3b CKD    2. Essential hypertension    3. Type 2 diabetes mellitus with diabetic nephropathy, without long-term current use of insulin    4. Acquired cyst of kidney    5. Nephrolithiasis        Plan:   Return to clinic in 3 months.  Labs for next visit include rp, pth, upc us.    Baseline creatinine is 1.5-1.7 prior to 2020.  He has a new baseline since his COVID infection in August of 2020 of 2.1-2.2.  PTH is 37 with a calcium of 9.4.  Renal US shows R 10.9 cm L 11.9 cm.  He sees Urology in [place].  UPC is 0.19.  Blood pressure is controlled on the current regimen.  Continue current medications as prescribed and reviewed.   Please have patient follow-up with your PCP or Endocrinology regarding the control and management of diabetes.  We discussed his potassium.  I did email him handouts regarding potassium and diet.  Please avoid or minimize all NSAIDS (ibuprofen, motrin, aleve, indocin, naprosyn) to minimize the risk to your kidneys.  Aspirin in a dose of 325 mg or less a day is likely the safest with regards to kidney function.  If you are able to take aspirin and do not have any bleeding problems or ulcers, this may be your best therapy.  Alternatively, acetaminophen (Tylenol) is the safer than NSAIDs with regards to kidney function.  I would ask you take this as directed on the bottle.  It is best to stay under 2 grams a day (4-500 mg tablets a day  maximum).

## 2022-08-17 DIAGNOSIS — E11.22 STAGE 3 CHRONIC KIDNEY DISEASE DUE TO TYPE 2 DIABETES MELLITUS: ICD-10-CM

## 2022-08-17 DIAGNOSIS — N18.30 STAGE 3 CHRONIC KIDNEY DISEASE DUE TO TYPE 2 DIABETES MELLITUS: ICD-10-CM

## 2022-09-16 ENCOUNTER — TELEPHONE (OUTPATIENT)
Dept: FAMILY MEDICINE | Facility: CLINIC | Age: 77
End: 2022-09-16
Payer: MEDICARE

## 2022-09-16 ENCOUNTER — LAB VISIT (OUTPATIENT)
Dept: LAB | Facility: CLINIC | Age: 77
End: 2022-09-16
Payer: MEDICARE

## 2022-09-16 DIAGNOSIS — N18.30 STAGE 3 CHRONIC KIDNEY DISEASE, UNSPECIFIED WHETHER STAGE 3A OR 3B CKD: ICD-10-CM

## 2022-09-16 DIAGNOSIS — E08.21 DIABETIC NEPHROPATHY ASSOCIATED WITH DIABETES MELLITUS DUE TO UNDERLYING CONDITION: ICD-10-CM

## 2022-09-16 LAB
ALBUMIN SERPL BCP-MCNC: 3.8 G/DL (ref 3.5–5.2)
ANION GAP SERPL CALC-SCNC: 13 MMOL/L (ref 8–16)
BUN SERPL-MCNC: 33 MG/DL (ref 8–23)
CALCIUM SERPL-MCNC: 10 MG/DL (ref 8.7–10.5)
CHLORIDE SERPL-SCNC: 108 MMOL/L (ref 95–110)
CO2 SERPL-SCNC: 21 MMOL/L (ref 23–29)
CREAT SERPL-MCNC: 2.4 MG/DL (ref 0.5–1.4)
EST. GFR  (NO RACE VARIABLE): 27 ML/MIN/1.73 M^2
ESTIMATED AVG GLUCOSE: 131 MG/DL (ref 68–131)
GLUCOSE SERPL-MCNC: 118 MG/DL (ref 70–110)
HBA1C MFR BLD: 6.2 % (ref 4–5.6)
PHOSPHATE SERPL-MCNC: 4.1 MG/DL (ref 2.7–4.5)
POTASSIUM SERPL-SCNC: 4.3 MMOL/L (ref 3.5–5.1)
PTH-INTACT SERPL-MCNC: 29.8 PG/ML (ref 9–77)
SODIUM SERPL-SCNC: 142 MMOL/L (ref 136–145)

## 2022-09-16 PROCEDURE — 36415 COLL VENOUS BLD VENIPUNCTURE: CPT | Mod: PN,,, | Performed by: STUDENT IN AN ORGANIZED HEALTH CARE EDUCATION/TRAINING PROGRAM

## 2022-09-16 PROCEDURE — 36415 PR COLLECTION VENOUS BLOOD,VENIPUNCTURE: ICD-10-PCS | Mod: PN,,, | Performed by: STUDENT IN AN ORGANIZED HEALTH CARE EDUCATION/TRAINING PROGRAM

## 2022-09-16 PROCEDURE — 80069 RENAL FUNCTION PANEL: CPT | Performed by: INTERNAL MEDICINE

## 2022-09-16 PROCEDURE — 83036 HEMOGLOBIN GLYCOSYLATED A1C: CPT | Performed by: FAMILY MEDICINE

## 2022-09-16 PROCEDURE — 83970 ASSAY OF PARATHORMONE: CPT | Performed by: INTERNAL MEDICINE

## 2022-09-16 NOTE — TELEPHONE ENCOUNTER
Kaycee at the  was trying to reach him about his lab orders.she spoke to pt.      ----- Message from Lynn Yin MA sent at 9/16/2022 11:34 AM CDT -----  Contact: self  Not sure what this is about  ----- Message -----  From: Betty Douglas  Sent: 9/16/2022  10:30 AM CDT  To: Mala Bobby Staff    Type:  Patient Returning Call    Who Called:  patient  Who Left Message for Patient:  not sure nothing showing  Does the patient know what this is regarding?:  maybe results  Best Call Back Number:  [phone]  Additional Information:  Thank

## 2022-09-20 ENCOUNTER — OFFICE VISIT (OUTPATIENT)
Dept: FAMILY MEDICINE | Facility: CLINIC | Age: 77
End: 2022-09-20
Payer: MEDICARE

## 2022-09-20 VITALS
DIASTOLIC BLOOD PRESSURE: 82 MMHG | TEMPERATURE: 98 F | SYSTOLIC BLOOD PRESSURE: 124 MMHG | BODY MASS INDEX: 27.99 KG/M2 | HEART RATE: 58 BPM | OXYGEN SATURATION: 97 % | WEIGHT: 189 LBS | HEIGHT: 69 IN

## 2022-09-20 DIAGNOSIS — I10 ESSENTIAL HYPERTENSION: Primary | ICD-10-CM

## 2022-09-20 DIAGNOSIS — E78.5 HYPERLIPIDEMIA, UNSPECIFIED HYPERLIPIDEMIA TYPE: ICD-10-CM

## 2022-09-20 DIAGNOSIS — N18.30 STAGE 3 CHRONIC KIDNEY DISEASE DUE TO TYPE 2 DIABETES MELLITUS: ICD-10-CM

## 2022-09-20 DIAGNOSIS — E11.22 STAGE 3 CHRONIC KIDNEY DISEASE DUE TO TYPE 2 DIABETES MELLITUS: ICD-10-CM

## 2022-09-20 DIAGNOSIS — I48.0 PAROXYSMAL ATRIAL FIBRILLATION: ICD-10-CM

## 2022-09-20 DIAGNOSIS — E11.42 DIABETIC POLYNEUROPATHY ASSOCIATED WITH TYPE 2 DIABETES MELLITUS: ICD-10-CM

## 2022-09-20 PROBLEM — R61 DIAPHORESIS: Status: RESOLVED | Noted: 2020-06-02 | Resolved: 2022-09-20

## 2022-09-20 PROBLEM — N17.9 AKI (ACUTE KIDNEY INJURY): Status: RESOLVED | Noted: 2020-05-30 | Resolved: 2022-09-20

## 2022-09-20 PROCEDURE — 1159F PR MEDICATION LIST DOCUMENTED IN MEDICAL RECORD: ICD-10-PCS | Mod: CPTII,S$GLB,, | Performed by: FAMILY MEDICINE

## 2022-09-20 PROCEDURE — 99214 OFFICE O/P EST MOD 30 MIN: CPT | Mod: S$GLB,,, | Performed by: FAMILY MEDICINE

## 2022-09-20 PROCEDURE — 1101F PT FALLS ASSESS-DOCD LE1/YR: CPT | Mod: CPTII,S$GLB,, | Performed by: FAMILY MEDICINE

## 2022-09-20 PROCEDURE — 1160F RVW MEDS BY RX/DR IN RCRD: CPT | Mod: CPTII,S$GLB,, | Performed by: FAMILY MEDICINE

## 2022-09-20 PROCEDURE — 3074F PR MOST RECENT SYSTOLIC BLOOD PRESSURE < 130 MM HG: ICD-10-PCS | Mod: CPTII,S$GLB,, | Performed by: FAMILY MEDICINE

## 2022-09-20 PROCEDURE — 3288F FALL RISK ASSESSMENT DOCD: CPT | Mod: CPTII,S$GLB,, | Performed by: FAMILY MEDICINE

## 2022-09-20 PROCEDURE — 3079F PR MOST RECENT DIASTOLIC BLOOD PRESSURE 80-89 MM HG: ICD-10-PCS | Mod: CPTII,S$GLB,, | Performed by: FAMILY MEDICINE

## 2022-09-20 PROCEDURE — 1126F AMNT PAIN NOTED NONE PRSNT: CPT | Mod: CPTII,S$GLB,, | Performed by: FAMILY MEDICINE

## 2022-09-20 PROCEDURE — 1159F MED LIST DOCD IN RCRD: CPT | Mod: CPTII,S$GLB,, | Performed by: FAMILY MEDICINE

## 2022-09-20 PROCEDURE — 1126F PR PAIN SEVERITY QUANTIFIED, NO PAIN PRESENT: ICD-10-PCS | Mod: CPTII,S$GLB,, | Performed by: FAMILY MEDICINE

## 2022-09-20 PROCEDURE — 3288F PR FALLS RISK ASSESSMENT DOCUMENTED: ICD-10-PCS | Mod: CPTII,S$GLB,, | Performed by: FAMILY MEDICINE

## 2022-09-20 PROCEDURE — 3074F SYST BP LT 130 MM HG: CPT | Mod: CPTII,S$GLB,, | Performed by: FAMILY MEDICINE

## 2022-09-20 PROCEDURE — 99999 PR PBB SHADOW E&M-EST. PATIENT-LVL IV: ICD-10-PCS | Mod: PBBFAC,,, | Performed by: FAMILY MEDICINE

## 2022-09-20 PROCEDURE — 3079F DIAST BP 80-89 MM HG: CPT | Mod: CPTII,S$GLB,, | Performed by: FAMILY MEDICINE

## 2022-09-20 PROCEDURE — 1160F PR REVIEW ALL MEDS BY PRESCRIBER/CLIN PHARMACIST DOCUMENTED: ICD-10-PCS | Mod: CPTII,S$GLB,, | Performed by: FAMILY MEDICINE

## 2022-09-20 PROCEDURE — 99214 PR OFFICE/OUTPT VISIT, EST, LEVL IV, 30-39 MIN: ICD-10-PCS | Mod: S$GLB,,, | Performed by: FAMILY MEDICINE

## 2022-09-20 PROCEDURE — 1101F PR PT FALLS ASSESS DOC 0-1 FALLS W/OUT INJ PAST YR: ICD-10-PCS | Mod: CPTII,S$GLB,, | Performed by: FAMILY MEDICINE

## 2022-09-20 PROCEDURE — 99999 PR PBB SHADOW E&M-EST. PATIENT-LVL IV: CPT | Mod: PBBFAC,,, | Performed by: FAMILY MEDICINE

## 2022-09-20 RX ORDER — PIOGLITAZONEHYDROCHLORIDE 15 MG/1
15 TABLET ORAL DAILY
Qty: 90 TABLET | Refills: 3 | Status: SHIPPED | OUTPATIENT
Start: 2022-09-20 | End: 2023-11-01 | Stop reason: SDUPTHER

## 2022-09-20 NOTE — PATIENT INSTRUCTIONS
F/U with Vascular surgery, Cardiology, and Nephrology.    Recheck Lipid in 3 months.  If LDL still over 11 will increase Pravastatin to 20 mg a day.

## 2022-09-20 NOTE — PROGRESS NOTES
SUBJECTIVE:    Patient ID: Ruy Ramos Jr. is a 77 y.o. male.    Chief Complaint: Follow-up, Hypertension, and Diabetes (Pt here for f/u HTN and DM/Pt seen  and d/c Norvasc/See labs)    Follow-up on this 77-year-old male with diabetes and hypertension and CKD 3.    Blood pressure today is 124/82 off of amlodipine.  Symptoms of orthostasis have revolved.    Scheduled for Carotid Ultrasound tomorrow.    Scheduled for Kidney Scan tomorrow.      He does need prescription refills.      Hemoglobin A1c is 6.2%  Last hemoglobin A1c was 6.4%.  Restarted tamsulosin after last visit for nocturia.  This has resulted in a slight improvement in his nocturia.  He reports 3 times a night.        Lab Visit on 09/16/2022   Component Date Value Ref Range Status    Hemoglobin A1C 09/16/2022 6.2 (H)  4.0 - 5.6 % Final    Estimated Avg Glucose 09/16/2022 131  68 - 131 mg/dL Final    Glucose 09/16/2022 118 (H)  70 - 110 mg/dL Final    Sodium 09/16/2022 142  136 - 145 mmol/L Final    Potassium 09/16/2022 4.3  3.5 - 5.1 mmol/L Final    Chloride 09/16/2022 108  95 - 110 mmol/L Final    CO2 09/16/2022 21 (L)  23 - 29 mmol/L Final    BUN 09/16/2022 33 (H)  8 - 23 mg/dL Final    Calcium 09/16/2022 10.0  8.7 - 10.5 mg/dL Final    Creatinine 09/16/2022 2.4 (H)  0.5 - 1.4 mg/dL Final    Albumin 09/16/2022 3.8  3.5 - 5.2 g/dL Final    Phosphorus 09/16/2022 4.1  2.7 - 4.5 mg/dL Final    eGFR 09/16/2022 27 (A)  >60 mL/min/1.73 m^2 Final    Anion Gap 09/16/2022 13  8 - 16 mmol/L Final    PTH, Intact 09/16/2022 29.8  9.0 - 77.0 pg/mL Final   Lab Visit on 09/16/2022   Component Date Value Ref Range Status    Protein, Urine Random 09/16/2022 26 (H)  0 - 15 mg/dL Final    Creatinine, Urine 09/16/2022 105.3  23.0 - 375.0 mg/dL Final    Prot/Creat Ratio, Urine 09/16/2022 0.25 (H)  0.00 - 0.20 Final   Lab Visit on 06/16/2022   Component Date Value Ref Range Status    Cholesterol 06/16/2022 194  120 - 199 mg/dL Final    Triglycerides  06/16/2022 142  30 - 150 mg/dL Final    HDL 06/16/2022 42  40 - 75 mg/dL Final    LDL Cholesterol 06/16/2022 123.6  63.0 - 159.0 mg/dL Final    HDL/Cholesterol Ratio 06/16/2022 21.6  20.0 - 50.0 % Final    Total Cholesterol/HDL Ratio 06/16/2022 4.6  2.0 - 5.0 Final    Non-HDL Cholesterol 06/16/2022 152  mg/dL Final   Lab Visit on 06/03/2022   Component Date Value Ref Range Status    WBC 06/03/2022 6.61  3.90 - 12.70 K/uL Final    RBC 06/03/2022 4.38 (L)  4.60 - 6.20 M/uL Final    Hemoglobin 06/03/2022 13.6 (L)  14.0 - 18.0 g/dL Final    Hematocrit 06/03/2022 41.6  40.0 - 54.0 % Final    MCV 06/03/2022 95  82 - 98 fL Final    MCH 06/03/2022 31.1 (H)  27.0 - 31.0 pg Final    MCHC 06/03/2022 32.7  32.0 - 36.0 g/dL Final    RDW 06/03/2022 14.6 (H)  11.5 - 14.5 % Final    Platelets 06/03/2022 205  150 - 450 K/uL Final    MPV 06/03/2022 11.4  9.2 - 12.9 fL Final    Immature Granulocytes 06/03/2022 0.9 (H)  0.0 - 0.5 % Final    Gran # (ANC) 06/03/2022 4.0  1.8 - 7.7 K/uL Final    Immature Grans (Abs) 06/03/2022 0.06 (H)  0.00 - 0.04 K/uL Final    Lymph # 06/03/2022 1.7  1.0 - 4.8 K/uL Final    Mono # 06/03/2022 0.6  0.3 - 1.0 K/uL Final    Eos # 06/03/2022 0.2  0.0 - 0.5 K/uL Final    Baso # 06/03/2022 0.06  0.00 - 0.20 K/uL Final    nRBC 06/03/2022 0  0 /100 WBC Final    Gran % 06/03/2022 59.8  38.0 - 73.0 % Final    Lymph % 06/03/2022 26.3  18.0 - 48.0 % Final    Mono % 06/03/2022 9.7  4.0 - 15.0 % Final    Eosinophil % 06/03/2022 2.4  0.0 - 8.0 % Final    Basophil % 06/03/2022 0.9  0.0 - 1.9 % Final    Differential Method 06/03/2022 Automated   Final    Glucose 06/03/2022 122 (H)  70 - 110 mg/dL Final    Sodium 06/03/2022 136  136 - 145 mmol/L Final    Potassium 06/03/2022 5.0  3.5 - 5.1 mmol/L Final    Chloride 06/03/2022 106  95 - 110 mmol/L Final    CO2 06/03/2022 19 (L)  23 - 29 mmol/L Final    BUN 06/03/2022 33 (H)  8 - 23 mg/dL Final    Calcium 06/03/2022 9.4  8.7 - 10.5 mg/dL Final    Creatinine 06/03/2022 2.2  (H)  0.5 - 1.4 mg/dL Final    Albumin 06/03/2022 3.8  3.5 - 5.2 g/dL Final    Phosphorus 06/03/2022 3.0  2.7 - 4.5 mg/dL Final    eGFR if  06/03/2022 32 (A)  >60 mL/min/1.73 m^2 Final    eGFR if non African American 06/03/2022 27 (A)  >60 mL/min/1.73 m^2 Final    Anion Gap 06/03/2022 11  8 - 16 mmol/L Final    PTH, Intact 06/03/2022 36.8  9.0 - 77.0 pg/mL Final    Uric Acid 06/03/2022 4.6  3.4 - 7.0 mg/dL Final   Lab Visit on 06/03/2022   Component Date Value Ref Range Status    Protein, Urine Random 06/03/2022 32 (H)  0 - 15 mg/dL Final    Creatinine, Urine 06/03/2022 165.0  23.0 - 375.0 mg/dL Final    Prot/Creat Ratio, Urine 06/03/2022 0.19  0.00 - 0.20 Final   Lab Visit on 06/03/2022   Component Date Value Ref Range Status    PSA, Screen 06/03/2022 0.75  0.00 - 4.00 ng/mL Final    Hemoglobin A1C 06/03/2022 6.4 (H)  4.0 - 5.6 % Final    Estimated Avg Glucose 06/03/2022 137 (H)  68 - 131 mg/dL Final   Office Visit on 03/24/2022   Component Date Value Ref Range Status    POC Glucose 03/24/2022 143 (A)  70 - 110 MG/DL Final       Past Medical History:   Diagnosis Date    Diabetes mellitus, type 2     Hypertension     Kidney stone      Social History     Socioeconomic History    Marital status:    Tobacco Use    Smoking status: Never    Smokeless tobacco: Never   Substance and Sexual Activity    Alcohol use: No    Drug use: No     Past Surgical History:   Procedure Laterality Date    APPENDECTOMY  5/30/2020    Procedure: APPENDECTOMY;  Surgeon: Eusebio Gil III, MD;  Location: [facility] OR;  Service: General;;    arm fracture surgery      FRACTURE SURGERY      Left arm    LAPAROSCOPIC APPENDECTOMY N/A 5/30/2020    Procedure: APPENDECTOMY, LAPAROSCOPIC VS OPEN;  Surgeon: Eusebio Gil III, MD;  Location: [facility] OR;  Service: General;  Laterality: N/A;    Open Appendectomy w/ partial cecectomy   05/31/2020    Dr. Gil      No family history on file.    Review of patient's allergies  "indicates:  No Known Allergies    Current Outpatient Medications:     ACCU-CHEK [place] PLUS [place] [facility] [place] Disp: , Rfl:     ACCU-CHEK AUDREY PLUS TEST STRP Strp, , Disp: , Rfl:     ACCU-CHEK SOFTCLIX LANCETS Misc, , Disp: , Rfl:     aspirin (ECOTRIN) 81 MG EC tablet, Take 81 mg by mouth once daily., Disp: , Rfl:     canagliflozin (INVOKANA) 300 mg Tab tablet, Take 1 tablet (300 mg total) by mouth once daily., Disp: 90 tablet, Rfl: 3    ezetimibe (ZETIA) 10 mg tablet, Take 1 tablet (10 mg total) by mouth once daily., Disp: 90 tablet, Rfl: 3    folic acid-vit B6-vit B12 (FOLPLEX 2.2) 2.2-25-0.5 mg Tab, Take 1 tablet by mouth once daily., Disp: 90 each, Rfl: 3    gemfibroziL (LOPID) 600 MG tablet, Take 1 tablet (600 mg total) by mouth 2 (two) times daily before meals., Disp: 180 tablet, Rfl: 3    L. acidophilus/L. rhamnosus (DIGESTIVE HEALTH PROBIOTIC ORAL), Take 1 tablet by mouth once daily., Disp: , Rfl:     metoprolol succinate (TOPROL-XL) 25 MG 24 hr tablet, Take 25 mg by mouth once daily., Disp: , Rfl:     MULTIVITAMIN ORAL, Take 1 tablet by mouth once daily. , Disp: , Rfl:     omega 3-dha-epa-fish oil 900-1,400 mg CpDR, Take 2 capsules by mouth 2 (two) times daily. , Disp: , Rfl:     pravastatin (PRAVACHOL) 10 MG tablet, Take 1 tablet (10 mg total) by mouth once daily., Disp: 90 tablet, Rfl: 3    telmisartan (MICARDIS) 40 MG Tab, Take 40 mg by mouth once daily., Disp: , Rfl:     vitamin D (VITAMIN D3) 1000 units Tab, Take 1,000 Units by mouth once daily., Disp: , Rfl:     pioglitazone (ACTOS) 15 MG tablet, Take 1 tablet (15 mg total) by mouth once daily., Disp: 90 tablet, Rfl: 3    Review of Systems   Constitutional:         Patient reports decreased nocturia to 2 to 3 times a night.  No dysuria.  No incontinence.  Increased frequency.  No A Fib.  Sensory neuropathy but decreased "shooting pains ".     All other systems reviewed and are negative.        Objective:      Vitals:    09/20/22 1056   BP: 124/82   Pulse: " "(!) 58   Temp: 98 °F (36.7 °C)   SpO2: 97%   Weight: 85.7 kg (189 lb)   Height: 5' 9" (1.753 m)     Physical Exam  Nursing note reviewed.   Constitutional:       Comments:     Neck is without adenopathy.  There are no carotid bruits.  Lungs clear to auscultation.  Heart is regular rate and rhythm without murmurs.  No pretibial edema is appreciated.           Assessment:       1. Essential hypertension    2. Hyperlipidemia, unspecified hyperlipidemia type    3. Stage 3 chronic kidney disease due to type 2 diabetes mellitus    4. Paroxysmal atrial fibrillation    5. Diabetic polyneuropathy associated with type 2 diabetes mellitus         Plan:       Essential hypertension    Hyperlipidemia, unspecified hyperlipidemia type  -     Lipid Panel; Future; Expected date: 12/20/2022    Stage 3 chronic kidney disease due to type 2 diabetes mellitus    Paroxysmal atrial fibrillation    Diabetic polyneuropathy associated with type 2 diabetes mellitus  -     pioglitazone (ACTOS) 15 MG tablet; Take 1 tablet (15 mg total) by mouth once daily.  Dispense: 90 tablet; Refill: 3    Follow up in about 3 months (around 12/20/2022).      F/U with Vascular surgery, Cardiology, and Nephrology.  Recheck Lipid in 3 months.  If LDL still over 11 will increase Pravastatin to 20 mg a day.  9/20/2022 Kanu Medeiros M.D.      "

## 2022-09-21 ENCOUNTER — HOSPITAL ENCOUNTER (OUTPATIENT)
Dept: RADIOLOGY | Facility: HOSPITAL | Age: 77
Discharge: HOME | End: 2022-09-21
Attending: INTERNAL MEDICINE
Payer: MEDICARE

## 2022-09-21 DIAGNOSIS — N18.30 STAGE 3 CHRONIC KIDNEY DISEASE, UNSPECIFIED WHETHER STAGE 3A OR 3B CKD: ICD-10-CM

## 2022-09-21 PROCEDURE — 76770 US EXAM ABDO BACK WALL COMP: CPT | Mod: TC,PO

## 2022-09-28 ENCOUNTER — TELEPHONE (OUTPATIENT)
Dept: NEPHROLOGY | Facility: CLINIC | Age: 77
End: 2022-09-28
Payer: MEDICARE

## 2022-09-28 NOTE — TELEPHONE ENCOUNTER
----- Message from Pricila Fontenot sent at 9/28/2022 10:46 AM CDT -----  Contact: [phone]  Type: Needs Medical Advice  Who Called:  Doe wife     Best Call Back Number: [phone]    Additional Information: Pts wife is calling to have appt on 10/5 changed to a virtual appt. Pls call back and advise

## 2022-10-05 ENCOUNTER — OFFICE VISIT (OUTPATIENT)
Dept: NEPHROLOGY | Facility: CLINIC | Age: 77
End: 2022-10-05
Payer: MEDICARE

## 2022-10-05 DIAGNOSIS — I10 PRIMARY HYPERTENSION: ICD-10-CM

## 2022-10-05 DIAGNOSIS — N18.32 STAGE 3B CHRONIC KIDNEY DISEASE: Primary | ICD-10-CM

## 2022-10-05 DIAGNOSIS — N20.0 KIDNEY STONE: ICD-10-CM

## 2022-10-05 DIAGNOSIS — E11.22 TYPE 2 DIABETES MELLITUS WITH STAGE 3B CHRONIC KIDNEY DISEASE, WITHOUT LONG-TERM CURRENT USE OF INSULIN: ICD-10-CM

## 2022-10-05 DIAGNOSIS — N18.32 TYPE 2 DIABETES MELLITUS WITH STAGE 3B CHRONIC KIDNEY DISEASE, WITHOUT LONG-TERM CURRENT USE OF INSULIN: ICD-10-CM

## 2022-10-05 DIAGNOSIS — N28.1 ACQUIRED CYST OF KIDNEY: ICD-10-CM

## 2022-10-05 PROCEDURE — 1160F RVW MEDS BY RX/DR IN RCRD: CPT | Mod: CPTII,95,, | Performed by: INTERNAL MEDICINE

## 2022-10-05 PROCEDURE — 99214 PR OFFICE/OUTPT VISIT, EST, LEVL IV, 30-39 MIN: ICD-10-PCS | Mod: 95,,, | Performed by: INTERNAL MEDICINE

## 2022-10-05 PROCEDURE — 1159F MED LIST DOCD IN RCRD: CPT | Mod: CPTII,95,, | Performed by: INTERNAL MEDICINE

## 2022-10-05 PROCEDURE — 1159F PR MEDICATION LIST DOCUMENTED IN MEDICAL RECORD: ICD-10-PCS | Mod: CPTII,95,, | Performed by: INTERNAL MEDICINE

## 2022-10-05 PROCEDURE — 99214 OFFICE O/P EST MOD 30 MIN: CPT | Mod: 95,,, | Performed by: INTERNAL MEDICINE

## 2022-10-05 PROCEDURE — 1160F PR REVIEW ALL MEDS BY PRESCRIBER/CLIN PHARMACIST DOCUMENTED: ICD-10-PCS | Mod: CPTII,95,, | Performed by: INTERNAL MEDICINE

## 2022-10-05 NOTE — PROGRESS NOTES
Patient, Ruy Ramos Jr. (MRN #[id number]), presented with a recent Estimated Glumerular Filtration Rate (EGFR) between 15 and 29 consistent with the definition of chronic kidney disease stage 4 (ICD10 - N18.4).    eGFR if non    Date Value Ref Range Status   06/03/2022 27 (A) >60 mL/min/1.73 m^2 Final     Comment:     Calculation used to obtain the estimated glomerular filtration  rate (eGFR) is the CKD-EPI equation.          The patient's chronic kidney disease stage 4 was monitored, evaluated, addressed and/or treated. This addendum to the medical record is made on 10/05/2022.

## 2022-10-05 NOTE — PROGRESS NOTES
Subjective:       Patient ID: Ruy Ramos Jr. is a 77 y.o. White male who presents for return patient evaluation for chronic renal failure.    The patient location is:  Patient Home   The chief complaint leading to consultation is: ckd  Visit type: Virtual visit with synchronous audio and video  Total time spent with patient: 15 minutes  Each patient to whom he or she provides medical services by telemedicine is:  (1) informed of the relationship between the physician and patient and the respective role of any other health care provider with respect to management of the patient; and (2) notified that he or she may decline to receive medical services by telemedicine and may withdraw from such care at any time.       He has no uremic or urinary symptoms and is in his usual state of health.   He had COVID in August on 2020.  His blood glucose has been controlled.   He had a couple of his medications stopped (amlodipine and tamsulosin due to dizziness).  His dizziness and blood pressure is controlled.      Review of Systems   Constitutional:  Positive for fatigue. Negative for appetite change, chills and fever.   HENT:  Negative for congestion.    Eyes:  Positive for visual disturbance.   Respiratory:  Positive for shortness of breath (with exertion). Negative for cough.    Cardiovascular:  Negative for chest pain and leg swelling.   Gastrointestinal:  Negative for abdominal pain, diarrhea, nausea and vomiting.   Genitourinary:  Negative for difficulty urinating, dysuria and hematuria.   Musculoskeletal:  Positive for arthralgias (B shoulders). Negative for myalgias.   Skin:  Negative for rash.   Neurological:  Positive for numbness (feet). Negative for dizziness and headaches.   Psychiatric/Behavioral:  Negative for sleep disturbance.      The past medical, family and social histories were reviewed for this encounter.     There were no vitals taken for this visit.    Objective:      Physical Exam  Vitals  reviewed.   Constitutional:       General: He is not in acute distress.     Appearance: He is well-developed.   HENT:      Head: Normocephalic and atraumatic.   Eyes:      General: No scleral icterus.  Pulmonary:      Effort: Pulmonary effort is normal. No respiratory distress.   Neurological:      Mental Status: He is alert and oriented to person, place, and time.   Psychiatric:         Mood and Affect: Mood normal.         Behavior: Behavior normal.       Assessment:       1. Stage 3b chronic kidney disease    2. Primary hypertension    3. Type 2 diabetes mellitus with stage 3b chronic kidney disease, without long-term current use of insulin    4. Acquired cyst of kidney    5. Kidney stone          Plan:   Return to clinic in 3 months.  Labs for next visit include rp, pth, upc per so.    Baseline creatinine is 1.5-1.7 prior to 2020.  He has a new baseline since his COVID infection in August of 2020 of 2.1-2.2.  PTH is 30 with a calcium of 10.0.  Renal US shows R 9.2 cm L 9.3 cm.  He sees Urology in [place].  UPC is 0.25.  Your blood pressure is controlled on your current medications.  Keep in mind that weight loss often improves blood pressure.  If you do diet and lose weight we will likely need to adjust your medications.  The effects of diabetes as it relates to kidney function was discussed.  As for the control of your blood sugar, please follow up with your PCP or Endocrinology.  We discussed kidney cysts and that they are common (occur in 50% of patients over 50 years old).  We do like to monitor them with repeat ultrasound ever 1-2 years to document stability.  He did have a ruptured appendix in May of 2020 which could have been the event that affected his kidney function.

## 2022-11-28 ENCOUNTER — TELEPHONE (OUTPATIENT)
Dept: NEPHROLOGY | Facility: CLINIC | Age: 77
End: 2022-11-28
Payer: MEDICARE

## 2022-11-28 DIAGNOSIS — N18.32 STAGE 3B CHRONIC KIDNEY DISEASE: Primary | ICD-10-CM

## 2022-11-28 DIAGNOSIS — Z12.5 SCREENING PSA (PROSTATE SPECIFIC ANTIGEN): ICD-10-CM

## 2022-11-29 NOTE — TELEPHONE ENCOUNTER
----- Message from Cassandra Doherty sent at 11/29/2022 10:30 AM CST -----  Who Called: Wife    What is the reqeust in detail: Requesting call back to have PSA lab orders attached to patient's already scheduled lab appointment in January. Please advise.    Can the clinic reply by [facility]? No    Best Call Back Number: [phone]    Additional Information:

## 2022-12-12 ENCOUNTER — LAB VISIT (OUTPATIENT)
Dept: LAB | Facility: CLINIC | Age: 77
End: 2022-12-12
Payer: MEDICARE

## 2022-12-12 DIAGNOSIS — N18.30 STAGE 3 CHRONIC KIDNEY DISEASE DUE TO TYPE 2 DIABETES MELLITUS: ICD-10-CM

## 2022-12-12 DIAGNOSIS — E11.22 STAGE 3 CHRONIC KIDNEY DISEASE DUE TO TYPE 2 DIABETES MELLITUS: ICD-10-CM

## 2022-12-12 DIAGNOSIS — E78.5 HYPERLIPIDEMIA, UNSPECIFIED HYPERLIPIDEMIA TYPE: ICD-10-CM

## 2022-12-12 LAB
ALBUMIN SERPL BCP-MCNC: 3.9 G/DL (ref 3.5–5.2)
ALP SERPL-CCNC: 100 U/L (ref 55–135)
ALT SERPL W/O P-5'-P-CCNC: 16 U/L (ref 10–44)
ANION GAP SERPL CALC-SCNC: 8 MMOL/L (ref 8–16)
AST SERPL-CCNC: 16 U/L (ref 10–40)
BILIRUB SERPL-MCNC: 0.4 MG/DL (ref 0.1–1)
BUN SERPL-MCNC: 32 MG/DL (ref 8–23)
CALCIUM SERPL-MCNC: 9.9 MG/DL (ref 8.7–10.5)
CHLORIDE SERPL-SCNC: 109 MMOL/L (ref 95–110)
CHOLEST SERPL-MCNC: 204 MG/DL (ref 120–199)
CHOLEST/HDLC SERPL: 4.9 {RATIO} (ref 2–5)
CO2 SERPL-SCNC: 25 MMOL/L (ref 23–29)
CREAT SERPL-MCNC: 2.3 MG/DL (ref 0.5–1.4)
EST. GFR  (NO RACE VARIABLE): 28 ML/MIN/1.73 M^2
GLUCOSE SERPL-MCNC: 138 MG/DL (ref 70–110)
HDLC SERPL-MCNC: 42 MG/DL (ref 40–75)
HDLC SERPL: 20.6 % (ref 20–50)
LDLC SERPL CALC-MCNC: 121.6 MG/DL (ref 63–159)
NONHDLC SERPL-MCNC: 162 MG/DL
POTASSIUM SERPL-SCNC: 5.4 MMOL/L (ref 3.5–5.1)
PROT SERPL-MCNC: 7.7 G/DL (ref 6–8.4)
SODIUM SERPL-SCNC: 142 MMOL/L (ref 136–145)
TRIGL SERPL-MCNC: 202 MG/DL (ref 30–150)

## 2022-12-12 PROCEDURE — 80053 COMPREHEN METABOLIC PANEL: CPT | Performed by: FAMILY MEDICINE

## 2022-12-12 PROCEDURE — 36415 PR COLLECTION VENOUS BLOOD,VENIPUNCTURE: ICD-10-PCS | Mod: ,,, | Performed by: STUDENT IN AN ORGANIZED HEALTH CARE EDUCATION/TRAINING PROGRAM

## 2022-12-12 PROCEDURE — 80061 LIPID PANEL: CPT | Performed by: FAMILY MEDICINE

## 2022-12-12 PROCEDURE — 36415 COLL VENOUS BLD VENIPUNCTURE: CPT | Mod: ,,, | Performed by: STUDENT IN AN ORGANIZED HEALTH CARE EDUCATION/TRAINING PROGRAM

## 2022-12-13 ENCOUNTER — OFFICE VISIT (OUTPATIENT)
Dept: FAMILY MEDICINE | Facility: CLINIC | Age: 77
End: 2022-12-13
Payer: MEDICARE

## 2022-12-13 VITALS
BODY MASS INDEX: 27.75 KG/M2 | TEMPERATURE: 98 F | HEIGHT: 69 IN | SYSTOLIC BLOOD PRESSURE: 126 MMHG | WEIGHT: 187.38 LBS | HEART RATE: 57 BPM | OXYGEN SATURATION: 97 % | DIASTOLIC BLOOD PRESSURE: 80 MMHG

## 2022-12-13 DIAGNOSIS — I77.9 CAROTID ARTERY DISEASE, UNSPECIFIED LATERALITY, UNSPECIFIED TYPE: ICD-10-CM

## 2022-12-13 DIAGNOSIS — E08.21 DIABETIC NEPHROPATHY ASSOCIATED WITH DIABETES MELLITUS DUE TO UNDERLYING CONDITION: ICD-10-CM

## 2022-12-13 PROCEDURE — 3079F DIAST BP 80-89 MM HG: CPT | Mod: CPTII,,, | Performed by: FAMILY MEDICINE

## 2022-12-13 PROCEDURE — 1159F PR MEDICATION LIST DOCUMENTED IN MEDICAL RECORD: ICD-10-PCS | Mod: CPTII,,, | Performed by: FAMILY MEDICINE

## 2022-12-13 PROCEDURE — 1159F MED LIST DOCD IN RCRD: CPT | Mod: CPTII,,, | Performed by: FAMILY MEDICINE

## 2022-12-13 PROCEDURE — 3074F SYST BP LT 130 MM HG: CPT | Mod: CPTII,,, | Performed by: FAMILY MEDICINE

## 2022-12-13 PROCEDURE — 3079F PR MOST RECENT DIASTOLIC BLOOD PRESSURE 80-89 MM HG: ICD-10-PCS | Mod: CPTII,,, | Performed by: FAMILY MEDICINE

## 2022-12-13 PROCEDURE — 3288F PR FALLS RISK ASSESSMENT DOCUMENTED: ICD-10-PCS | Mod: CPTII,,, | Performed by: FAMILY MEDICINE

## 2022-12-13 PROCEDURE — 99213 OFFICE O/P EST LOW 20 MIN: CPT | Mod: ,,, | Performed by: FAMILY MEDICINE

## 2022-12-13 PROCEDURE — 99213 PR OFFICE/OUTPT VISIT, EST, LEVL III, 20-29 MIN: ICD-10-PCS | Mod: ,,, | Performed by: FAMILY MEDICINE

## 2022-12-13 PROCEDURE — 1126F AMNT PAIN NOTED NONE PRSNT: CPT | Mod: CPTII,,, | Performed by: FAMILY MEDICINE

## 2022-12-13 PROCEDURE — 1126F PR PAIN SEVERITY QUANTIFIED, NO PAIN PRESENT: ICD-10-PCS | Mod: CPTII,,, | Performed by: FAMILY MEDICINE

## 2022-12-13 PROCEDURE — 3074F PR MOST RECENT SYSTOLIC BLOOD PRESSURE < 130 MM HG: ICD-10-PCS | Mod: CPTII,,, | Performed by: FAMILY MEDICINE

## 2022-12-13 PROCEDURE — 3288F FALL RISK ASSESSMENT DOCD: CPT | Mod: CPTII,,, | Performed by: FAMILY MEDICINE

## 2022-12-13 PROCEDURE — 1101F PT FALLS ASSESS-DOCD LE1/YR: CPT | Mod: CPTII,,, | Performed by: FAMILY MEDICINE

## 2022-12-13 PROCEDURE — 1101F PR PT FALLS ASSESS DOC 0-1 FALLS W/OUT INJ PAST YR: ICD-10-PCS | Mod: CPTII,,, | Performed by: FAMILY MEDICINE

## 2022-12-13 RX ORDER — PRAVASTATIN SODIUM 10 MG/1
10 TABLET ORAL DAILY
Qty: 90 TABLET | Refills: 3 | Status: SHIPPED | OUTPATIENT
Start: 2022-12-13 | End: 2023-06-14 | Stop reason: SDUPTHER

## 2022-12-13 NOTE — PROGRESS NOTES
Subjective:       Patient ID: Ruy Ramos Jr. is a 77 y.o. male.    Chief Complaint: Follow-up (3 month follow up on DM and HTN. Pt states his blood pressure readings have been fair at home ranging about 124-133. ) and Establish Care (Pt was recently seen by Dr. Medeiros)    Mr Ramos is here for his routine 6 month diabetes follow up. He has the following diagnosis:  Patient Active Problem List  Diagnosis  · Nephrolithiasis  · Kidney stone  · Atrial fibrillation  · Diabetic nephropathy associated with diabetes mellitus due to underlying condition  · Carotid atherosclerosis  · Diabetic neuropathy  · Primary hypertension  · History of carotid endarterectomy  · Hyperlipidemia  · Type 2 diabetes mellitus with stage 3b chronic kidney disease, without long-term current use of insulin  · Body mass index (BMI) of 25.0 to 29.9  · Stage 3b chronic kidney disease  · Acquired cyst of kidney    Today, Mr Ramos has no complaints, and only needs one medication refilled. He has already has his follow up.       Follow-up  Pertinent negatives include no abdominal pain, chest pain, chills, congestion, coughing, diaphoresis, fever, nausea, rash or sore throat.   Review of Systems   Constitutional:  Negative for activity change, appetite change, chills, diaphoresis and fever.   HENT:  Negative for congestion, ear pain, postnasal drip, rhinorrhea and sore throat.         Chronic post-nasal drainage, since covid.    Eyes:  Negative for pain, discharge, redness and itching.   Respiratory:  Negative for cough and shortness of breath.    Cardiovascular:  Negative for chest pain, palpitations and leg swelling.   Gastrointestinal:  Negative for abdominal distention, abdominal pain, constipation, diarrhea and nausea.   Genitourinary:  Negative for difficulty urinating, dysuria, frequency and urgency.   Skin:  Negative for color change, rash and wound.   All other systems reviewed and are negative.    Patient Active Problem List    Diagnosis    Nephrolithiasis    Kidney stone    Atrial fibrillation    Diabetic nephropathy associated with diabetes mellitus due to underlying condition    Carotid atherosclerosis    Diabetic neuropathy    Primary hypertension    History of carotid endarterectomy    Hyperlipidemia    Type 2 diabetes mellitus with stage 3b chronic kidney disease, without long-term current use of insulin    Body mass index (BMI) of 25.0 to 29.9    Stage 3b chronic kidney disease    Acquired cyst of kidney       Objective:      Physical Exam  Vitals and nursing note reviewed.   Constitutional:       Appearance: Normal appearance. He is normal weight.   HENT:      Head: Normocephalic.      Nose: Nose normal.   Eyes:      Extraocular Movements: Extraocular movements intact.      Conjunctiva/sclera: Conjunctivae normal.      Pupils: Pupils are equal, round, and reactive to light.   Cardiovascular:      Rate and Rhythm: Normal rate and regular rhythm.      Heart sounds: Normal heart sounds. No murmur heard.  Pulmonary:      Effort: Pulmonary effort is normal. No respiratory distress.      Breath sounds: Normal breath sounds.   Musculoskeletal:         General: Normal range of motion.      Cervical back: Normal range of motion and neck supple.   Skin:     General: Skin is warm and dry.   Neurological:      General: No focal deficit present.      Mental Status: He is alert and oriented to person, place, and time.   Psychiatric:         Mood and Affect: Mood normal.         Behavior: Behavior normal.       Lab Results   Component Value Date    WBC 6.61 06/03/2022    HGB 13.6 (L) 06/03/2022    HCT 41.6 06/03/2022     06/03/2022    CHOL 204 (H) 12/12/2022    TRIG 202 (H) 12/12/2022    HDL 42 12/12/2022    ALT 16 12/12/2022    AST 16 12/12/2022     12/12/2022    K 5.4 (H) 12/12/2022     12/12/2022    CREATININE 2.3 (H) 12/12/2022    BUN 32 (H) 12/12/2022    CO2 25 12/12/2022    TSH 1.310 06/03/2020    PSA 0.75 06/03/2022     "HGBA1C 6.2 (H) 09/16/2022     The 10-year ASCVD risk score (Alem PEREZ, et al., 2019) is: 57.7%    Values used to calculate the score:      Age: 77 years      Sex: Male      Is Non- : No      Diabetic: Yes      Tobacco smoker: No      Systolic Blood Pressure: 126 mmHg      Is BP treated: Yes      HDL Cholesterol: 42 mg/dL      Total Cholesterol: 204 mg/dL  Visit Vitals  /80 (BP Location: Right arm, Patient Position: Sitting, BP Method: Large (Manual))   Pulse (!) 57   Temp 97.8 °F (36.6 °C) (Oral)   Ht 5' 9" (1.753 m)   Wt 85 kg (187 lb 6.3 oz)   SpO2 97%   BMI 27.67 kg/m²      Assessment:       1. Carotid artery disease, unspecified laterality, unspecified type    2. Diabetic nephropathy associated with diabetes mellitus due to underlying condition          Plan:       1. Carotid artery disease, unspecified laterality, unspecified type  -     pravastatin (PRAVACHOL) 10 MG tablet; Take 1 tablet (10 mg total) by mouth once daily.  Dispense: 90 tablet; Refill: 3    2. Diabetic nephropathy associated with diabetes mellitus due to underlying condition  Assessment & Plan:  Continue current meds, check glucose as directed, follow up with Dr Cano (nephrologist) as scheudled         Follow up in about 6 months (around 6/13/2023).      Future Appointments       Date Provider Specialty Appt Notes    1/4/2023  Lab [facility]     1/6/2023 Scott Cano MD Nephrology FACETIME [phone]             "

## 2022-12-14 ENCOUNTER — TELEPHONE (OUTPATIENT)
Dept: FAMILY MEDICINE | Facility: CLINIC | Age: 77
End: 2022-12-14
Payer: MEDICARE

## 2022-12-22 ENCOUNTER — TELEPHONE (OUTPATIENT)
Dept: NEPHROLOGY | Facility: CLINIC | Age: 77
End: 2022-12-22
Payer: MEDICARE

## 2022-12-22 ENCOUNTER — PATIENT MESSAGE (OUTPATIENT)
Dept: FAMILY MEDICINE | Facility: CLINIC | Age: 77
End: 2022-12-22
Payer: MEDICARE

## 2022-12-22 NOTE — TELEPHONE ENCOUNTER
----- Message from Haseeb May sent at 12/22/2022  9:53 AM CST -----  Contact: pt wife Andreia at [phone]  Type:  Sooner Appointment Request    Caller is requesting a sooner appointment.  Caller declined first available appointment listed below.  Caller will not accept being placed on the waitlist and is requesting a message be sent to doctor.    Name of Caller:  pt wife  When is the first available appointment?  3/27/22   Symptoms:  6 mon f/u  Best Call Back Number:  [phone]  Additional Information:  pt wife needs a sooner appt time. The first available is 3/27. He needs to be seen on a Tuesday, Wednesday or Thursday at 10am  or Friday at 9am per pt wife. Please call back and advise.

## 2022-12-30 DIAGNOSIS — E78.5 HYPERLIPIDEMIA, UNSPECIFIED HYPERLIPIDEMIA TYPE: ICD-10-CM

## 2022-12-30 DIAGNOSIS — N18.30 STAGE 3 CHRONIC KIDNEY DISEASE DUE TO TYPE 2 DIABETES MELLITUS: ICD-10-CM

## 2022-12-30 DIAGNOSIS — N18.4 CKD (CHRONIC KIDNEY DISEASE) STAGE 4, GFR 15-29 ML/MIN: ICD-10-CM

## 2022-12-30 DIAGNOSIS — I10 ESSENTIAL HYPERTENSION: ICD-10-CM

## 2022-12-30 DIAGNOSIS — E08.21 DIABETIC NEPHROPATHY ASSOCIATED WITH DIABETES MELLITUS DUE TO UNDERLYING CONDITION: ICD-10-CM

## 2022-12-30 DIAGNOSIS — Z12.5 SCREENING FOR MALIGNANT NEOPLASM OF PROSTATE: ICD-10-CM

## 2022-12-30 DIAGNOSIS — E11.8 CONTROLLED TYPE 2 DIABETES MELLITUS WITH COMPLICATION, WITHOUT LONG-TERM CURRENT USE OF INSULIN: ICD-10-CM

## 2022-12-30 DIAGNOSIS — I77.9 CAROTID ARTERY DISEASE, UNSPECIFIED LATERALITY, UNSPECIFIED TYPE: Primary | ICD-10-CM

## 2022-12-30 DIAGNOSIS — E11.22 STAGE 3 CHRONIC KIDNEY DISEASE DUE TO TYPE 2 DIABETES MELLITUS: ICD-10-CM

## 2022-12-30 DIAGNOSIS — R53.83 FATIGUE, UNSPECIFIED TYPE: ICD-10-CM

## 2023-02-01 ENCOUNTER — LAB VISIT (OUTPATIENT)
Dept: LAB | Facility: CLINIC | Age: 78
End: 2023-02-01
Payer: MEDICARE

## 2023-02-01 DIAGNOSIS — N18.32 STAGE 3B CHRONIC KIDNEY DISEASE: ICD-10-CM

## 2023-02-01 LAB
ALBUMIN SERPL BCP-MCNC: 3.7 G/DL (ref 3.5–5.2)
ANION GAP SERPL CALC-SCNC: 13 MMOL/L (ref 8–16)
BUN SERPL-MCNC: 41 MG/DL (ref 8–23)
CALCIUM SERPL-MCNC: 9.4 MG/DL (ref 8.7–10.5)
CHLORIDE SERPL-SCNC: 107 MMOL/L (ref 95–110)
CO2 SERPL-SCNC: 20 MMOL/L (ref 23–29)
CREAT SERPL-MCNC: 2.8 MG/DL (ref 0.5–1.4)
CREAT UR-MCNC: 78.3 MG/DL (ref 23–375)
EST. GFR  (NO RACE VARIABLE): 22 ML/MIN/1.73 M^2
GLUCOSE SERPL-MCNC: 150 MG/DL (ref 70–110)
PHOSPHATE SERPL-MCNC: 3.7 MG/DL (ref 2.7–4.5)
POTASSIUM SERPL-SCNC: 4.9 MMOL/L (ref 3.5–5.1)
PROT UR-MCNC: 27 MG/DL (ref 0–15)
PROT/CREAT UR: 0.34 MG/G{CREAT} (ref 0–0.2)
PTH-INTACT SERPL-MCNC: 29.6 PG/ML (ref 9–77)
SODIUM SERPL-SCNC: 140 MMOL/L (ref 136–145)

## 2023-02-01 PROCEDURE — 84156 ASSAY OF PROTEIN URINE: CPT | Performed by: INTERNAL MEDICINE

## 2023-02-01 PROCEDURE — 80069 RENAL FUNCTION PANEL: CPT | Performed by: INTERNAL MEDICINE

## 2023-02-01 PROCEDURE — 83970 ASSAY OF PARATHORMONE: CPT | Performed by: INTERNAL MEDICINE

## 2023-02-02 ENCOUNTER — OFFICE VISIT (OUTPATIENT)
Dept: NEPHROLOGY | Facility: CLINIC | Age: 78
End: 2023-02-02
Payer: MEDICARE

## 2023-02-02 ENCOUNTER — TELEPHONE (OUTPATIENT)
Dept: NEPHROLOGY | Facility: CLINIC | Age: 78
End: 2023-02-02

## 2023-02-02 DIAGNOSIS — E11.22 TYPE 2 DIABETES MELLITUS WITH STAGE 4 CHRONIC KIDNEY DISEASE, WITHOUT LONG-TERM CURRENT USE OF INSULIN: ICD-10-CM

## 2023-02-02 DIAGNOSIS — N20.0 KIDNEY STONE: ICD-10-CM

## 2023-02-02 DIAGNOSIS — N18.4 TYPE 2 DIABETES MELLITUS WITH STAGE 4 CHRONIC KIDNEY DISEASE, WITHOUT LONG-TERM CURRENT USE OF INSULIN: ICD-10-CM

## 2023-02-02 DIAGNOSIS — N18.4 CKD (CHRONIC KIDNEY DISEASE) STAGE 4, GFR 15-29 ML/MIN: Primary | ICD-10-CM

## 2023-02-02 DIAGNOSIS — N28.1 ACQUIRED CYST OF KIDNEY: ICD-10-CM

## 2023-02-02 DIAGNOSIS — I10 PRIMARY HYPERTENSION: ICD-10-CM

## 2023-02-02 PROCEDURE — 1160F RVW MEDS BY RX/DR IN RCRD: CPT | Mod: CPTII,95,, | Performed by: INTERNAL MEDICINE

## 2023-02-02 PROCEDURE — 1159F PR MEDICATION LIST DOCUMENTED IN MEDICAL RECORD: ICD-10-PCS | Mod: CPTII,95,, | Performed by: INTERNAL MEDICINE

## 2023-02-02 PROCEDURE — 1160F PR REVIEW ALL MEDS BY PRESCRIBER/CLIN PHARMACIST DOCUMENTED: ICD-10-PCS | Mod: CPTII,95,, | Performed by: INTERNAL MEDICINE

## 2023-02-02 PROCEDURE — 99213 OFFICE O/P EST LOW 20 MIN: CPT | Mod: 95,,, | Performed by: INTERNAL MEDICINE

## 2023-02-02 PROCEDURE — 99213 PR OFFICE/OUTPT VISIT, EST, LEVL III, 20-29 MIN: ICD-10-PCS | Mod: 95,,, | Performed by: INTERNAL MEDICINE

## 2023-02-02 PROCEDURE — 1159F MED LIST DOCD IN RCRD: CPT | Mod: CPTII,95,, | Performed by: INTERNAL MEDICINE

## 2023-02-02 NOTE — PROGRESS NOTES
Subjective:       Patient ID: Ruy Ramos Jr. is a 77 y.o. White male who presents for return patient evaluation for chronic renal failure.    The patient location is:  Patient Home   The chief complaint leading to consultation is: ckd  Visit type: Virtual visit with synchronous audio and video  Total time spent with patient: 15 minutes  Each patient to whom he or she provides medical services by telemedicine is:  (1) informed of the relationship between the physician and patient and the respective role of any other health care provider with respect to management of the patient; and (2) notified that he or she may decline to receive medical services by telemedicine and may withdraw from such care at any time.       He has no uremic or urinary symptoms and is in his usual state of health.   He had COVID in August on 2020.  His blood glucose has been controlled.         Review of Systems   Constitutional:  Positive for fatigue. Negative for appetite change, chills and fever.   HENT:  Negative for congestion.    Eyes:  Positive for visual disturbance.   Respiratory:  Positive for shortness of breath (with exertion). Negative for cough.    Cardiovascular:  Negative for chest pain and leg swelling.   Gastrointestinal:  Negative for abdominal pain, diarrhea, nausea and vomiting.   Genitourinary:  Negative for difficulty urinating, dysuria and hematuria.   Musculoskeletal:  Positive for arthralgias (B shoulders). Negative for myalgias.   Skin:  Negative for rash.   Neurological:  Positive for numbness (feet). Negative for dizziness and headaches.   Psychiatric/Behavioral:  Negative for sleep disturbance.        The past medical, family and social histories were reviewed for this encounter.     There were no vitals taken for this visit.    Objective:      Physical Exam  Vitals reviewed.   Constitutional:       General: He is not in acute distress.     Appearance: He is well-developed.   HENT:      Head:  Normocephalic and atraumatic.   Eyes:      General: No scleral icterus.  Pulmonary:      Effort: Pulmonary effort is normal. No respiratory distress.   Neurological:      Mental Status: He is alert and oriented to person, place, and time.   Psychiatric:         Mood and Affect: Mood normal.         Behavior: Behavior normal.       Assessment:       1. CKD (chronic kidney disease) stage 4, GFR 15-29 ml/min    2. Primary hypertension    3. Type 2 diabetes mellitus with stage 4 chronic kidney disease, without long-term current use of insulin    4. Acquired cyst of kidney    5. Kidney stone        Plan:   Return to clinic in 3 months.  Labs for next visit include rp, pth, upc per so.   RP in 2-3 weeks.  We will contact him with results.  Baseline creatinine is 1.5-1.7 prior to 2020.  He has a new baseline since his COVID infection in August of 2020 of 2.0-2.3.  PTH is 29 with a calcium of 9.4.  Renal US shows R 9.2 cm L 9.4 cm.  He sees Urology in [place].  UPC is 0.34 on an ARB.  Your blood pressure is controlled on your current medications.  Keep in mind that weight loss often improves blood pressure.  If you do diet and lose weight we will likely need to adjust your medications.  The effects of diabetes as it relates to kidney function was discussed.  As for the control of your blood sugar, please follow up with your PCP or Endocrinology.  We discussed kidney cysts and that they are common (occur in 50% of patients over 50 years old).  We do like to monitor them with repeat ultrasound ever 1-2 years to document stability.  He did have a ruptured appendix in May of 2020 which could have been the event that affected his kidney function as well as COVID infection that same year in August.

## 2023-02-08 ENCOUNTER — PATIENT MESSAGE (OUTPATIENT)
Dept: NEPHROLOGY | Facility: CLINIC | Age: 78
End: 2023-02-08
Payer: MEDICARE

## 2023-02-09 ENCOUNTER — LAB VISIT (OUTPATIENT)
Dept: LAB | Facility: CLINIC | Age: 78
End: 2023-02-09
Payer: MEDICARE

## 2023-02-09 DIAGNOSIS — N18.4 CKD (CHRONIC KIDNEY DISEASE) STAGE 4, GFR 15-29 ML/MIN: ICD-10-CM

## 2023-02-09 LAB
ALBUMIN SERPL BCP-MCNC: 3.9 G/DL (ref 3.5–5.2)
ALBUMIN SERPL BCP-MCNC: 3.9 G/DL (ref 3.5–5.2)
ANION GAP SERPL CALC-SCNC: 12 MMOL/L (ref 8–16)
ANION GAP SERPL CALC-SCNC: 12 MMOL/L (ref 8–16)
BUN SERPL-MCNC: 34 MG/DL (ref 8–23)
BUN SERPL-MCNC: 34 MG/DL (ref 8–23)
CALCIUM SERPL-MCNC: 9.4 MG/DL (ref 8.7–10.5)
CALCIUM SERPL-MCNC: 9.4 MG/DL (ref 8.7–10.5)
CHLORIDE SERPL-SCNC: 108 MMOL/L (ref 95–110)
CHLORIDE SERPL-SCNC: 108 MMOL/L (ref 95–110)
CO2 SERPL-SCNC: 20 MMOL/L (ref 23–29)
CO2 SERPL-SCNC: 20 MMOL/L (ref 23–29)
CREAT SERPL-MCNC: 2.2 MG/DL (ref 0.5–1.4)
CREAT SERPL-MCNC: 2.2 MG/DL (ref 0.5–1.4)
CREAT UR-MCNC: 109.2 MG/DL (ref 23–375)
EST. GFR  (NO RACE VARIABLE): 30 ML/MIN/1.73 M^2
EST. GFR  (NO RACE VARIABLE): 30 ML/MIN/1.73 M^2
GLUCOSE SERPL-MCNC: 148 MG/DL (ref 70–110)
GLUCOSE SERPL-MCNC: 148 MG/DL (ref 70–110)
PHOSPHATE SERPL-MCNC: 3.3 MG/DL (ref 2.7–4.5)
PHOSPHATE SERPL-MCNC: 3.3 MG/DL (ref 2.7–4.5)
POTASSIUM SERPL-SCNC: 4.7 MMOL/L (ref 3.5–5.1)
POTASSIUM SERPL-SCNC: 4.7 MMOL/L (ref 3.5–5.1)
PROT UR-MCNC: 27 MG/DL (ref 0–15)
PROT/CREAT UR: 0.25 MG/G{CREAT} (ref 0–0.2)
PTH-INTACT SERPL-MCNC: 35.2 PG/ML (ref 9–77)
SODIUM SERPL-SCNC: 140 MMOL/L (ref 136–145)
SODIUM SERPL-SCNC: 140 MMOL/L (ref 136–145)

## 2023-02-09 PROCEDURE — 82570 ASSAY OF URINE CREATININE: CPT | Performed by: INTERNAL MEDICINE

## 2023-02-09 PROCEDURE — 80069 RENAL FUNCTION PANEL: CPT | Performed by: INTERNAL MEDICINE

## 2023-02-09 PROCEDURE — 83970 ASSAY OF PARATHORMONE: CPT | Performed by: INTERNAL MEDICINE

## 2023-03-03 LAB
LEFT EYE DM RETINOPATHY: NEGATIVE
RIGHT EYE DM RETINOPATHY: NEGATIVE

## 2023-04-04 ENCOUNTER — PATIENT MESSAGE (OUTPATIENT)
Dept: FAMILY MEDICINE | Facility: CLINIC | Age: 78
End: 2023-04-04
Payer: MEDICARE

## 2023-04-04 DIAGNOSIS — E11.42 DIABETIC POLYNEUROPATHY ASSOCIATED WITH TYPE 2 DIABETES MELLITUS: Primary | ICD-10-CM

## 2023-04-04 DIAGNOSIS — E08.21 DIABETIC NEPHROPATHY ASSOCIATED WITH DIABETES MELLITUS DUE TO UNDERLYING CONDITION: ICD-10-CM

## 2023-04-04 RX ORDER — CYANOCOBALAMIN/FOLIC AC/VIT B6 0.5-2.2-25
1 TABLET ORAL DAILY
Qty: 90 EACH | Refills: 3 | Status: CANCELLED | OUTPATIENT
Start: 2023-04-04

## 2023-04-04 RX ORDER — CYANOCOBALAMIN/FOLIC AC/VIT B6 0.5-2.2-25
1 TABLET ORAL DAILY
Qty: 90 EACH | Refills: 3 | Status: SHIPPED | OUTPATIENT
Start: 2023-04-04 | End: 2023-04-11

## 2023-04-06 ENCOUNTER — TELEPHONE (OUTPATIENT)
Dept: FAMILY MEDICINE | Facility: CLINIC | Age: 78
End: 2023-04-06
Payer: MEDICARE

## 2023-04-06 NOTE — TELEPHONE ENCOUNTER
----- Message from Ed Hall sent at 4/6/2023  1:06 PM CDT -----  Regarding: RX sent to wrong pharmacy  Type:  RX Refill Request    Who Called: Pt's Wife  Refill or New Rx:REfill  RX Name and Strength:canagliflozin (INVOKANA) 300 mg Tab tablet  folic acid-vit B6-vit B12 (FOLPLEX 2.2) 2.2-25-0.5 mg Tab    How is the patient currently taking it? (ex. 1XDay):as directed  Is this a 30 day or 90 day RX:90  Preferred Pharmacy with phone number:    [facility] - [place], [place] - [address]  [address] [address]  Phone: [phone] Fax: [phone]      Local or Mail Order:Mail Order  Ordering Provider:Col  Would the patient rather a call back or a response via [facility]? Call back  Best Call Back Number:[phone]      Additional Information: Sts that the RX needed  to go to [facility] not [facility].  Please advise -- Thank you

## 2023-04-19 ENCOUNTER — HOSPITAL ENCOUNTER (EMERGENCY)
Facility: HOSPITAL | Age: 78
Discharge: HOME | End: 2023-04-19
Attending: EMERGENCY MEDICINE
Payer: MEDICARE

## 2023-04-19 VITALS
OXYGEN SATURATION: 94 % | DIASTOLIC BLOOD PRESSURE: 81 MMHG | SYSTOLIC BLOOD PRESSURE: 173 MMHG | BODY MASS INDEX: 27.32 KG/M2 | HEART RATE: 69 BPM | RESPIRATION RATE: 18 BRPM | TEMPERATURE: 98 F | WEIGHT: 185 LBS

## 2023-04-19 DIAGNOSIS — R10.9 LEFT FLANK PAIN: Primary | ICD-10-CM

## 2023-04-19 DIAGNOSIS — N20.1 URETEROLITHIASIS: ICD-10-CM

## 2023-04-19 LAB
ALBUMIN SERPL BCP-MCNC: 4.5 G/DL (ref 3.5–5.2)
ALP SERPL-CCNC: 105 U/L (ref 55–135)
ALT SERPL W/O P-5'-P-CCNC: 24 U/L (ref 10–44)
ANION GAP SERPL CALC-SCNC: 17 MMOL/L (ref 8–16)
AST SERPL-CCNC: 25 U/L (ref 10–40)
BACTERIA #/AREA URNS HPF: NEGATIVE /HPF
BASOPHILS # BLD AUTO: 0.05 K/UL (ref 0–0.2)
BASOPHILS NFR BLD: 0.5 % (ref 0–1.9)
BILIRUB SERPL-MCNC: 0.9 MG/DL (ref 0.1–1)
BILIRUB UR QL STRIP: NEGATIVE
BUN SERPL-MCNC: 29 MG/DL (ref 8–23)
CALCIUM SERPL-MCNC: 9.6 MG/DL (ref 8.7–10.5)
CHLORIDE SERPL-SCNC: 103 MMOL/L (ref 95–110)
CLARITY UR: CLEAR
CO2 SERPL-SCNC: 20 MMOL/L (ref 23–29)
COLOR UR: YELLOW
CREAT SERPL-MCNC: 2.2 MG/DL (ref 0.5–1.4)
DIFFERENTIAL METHOD: ABNORMAL
EOSINOPHIL # BLD AUTO: 0 K/UL (ref 0–0.5)
EOSINOPHIL NFR BLD: 0.2 % (ref 0–8)
ERYTHROCYTE [DISTWIDTH] IN BLOOD BY AUTOMATED COUNT: 14.2 % (ref 11.5–14.5)
EST. GFR  (NO RACE VARIABLE): 29.5 ML/MIN/1.73 M^2
GLUCOSE SERPL-MCNC: 169 MG/DL (ref 70–110)
GLUCOSE UR QL STRIP: ABNORMAL
HCT VFR BLD AUTO: 48.3 % (ref 40–54)
HGB BLD-MCNC: 15.8 G/DL (ref 14–18)
HGB UR QL STRIP: ABNORMAL
HYALINE CASTS #/AREA URNS LPF: 0 /LPF
IMM GRANULOCYTES # BLD AUTO: 0.08 K/UL (ref 0–0.04)
IMM GRANULOCYTES NFR BLD AUTO: 0.8 % (ref 0–0.5)
KETONES UR QL STRIP: NEGATIVE
LEUKOCYTE ESTERASE UR QL STRIP: NEGATIVE
LYMPHOCYTES # BLD AUTO: 1.1 K/UL (ref 1–4.8)
LYMPHOCYTES NFR BLD: 11 % (ref 18–48)
MCH RBC QN AUTO: 30.6 PG (ref 27–31)
MCHC RBC AUTO-ENTMCNC: 32.7 G/DL (ref 32–36)
MCV RBC AUTO: 93 FL (ref 82–98)
MICROSCOPIC COMMENT: ABNORMAL
MONOCYTES # BLD AUTO: 0.6 K/UL (ref 0.3–1)
MONOCYTES NFR BLD: 5.7 % (ref 4–15)
NEUTROPHILS # BLD AUTO: 8.3 K/UL (ref 1.8–7.7)
NEUTROPHILS NFR BLD: 81.8 % (ref 38–73)
NITRITE UR QL STRIP: NEGATIVE
NRBC BLD-RTO: 0 /100 WBC
PH UR STRIP: 6 [PH] (ref 5–8)
PLATELET # BLD AUTO: 191 K/UL (ref 150–450)
PMV BLD AUTO: 10.4 FL (ref 9.2–12.9)
POTASSIUM SERPL-SCNC: 4.3 MMOL/L (ref 3.5–5.1)
PROT SERPL-MCNC: 8.2 G/DL (ref 6–8.4)
PROT UR QL STRIP: ABNORMAL
RBC # BLD AUTO: 5.17 M/UL (ref 4.6–6.2)
RBC #/AREA URNS HPF: 5 /HPF (ref 0–4)
SODIUM SERPL-SCNC: 140 MMOL/L (ref 136–145)
SP GR UR STRIP: 1.03 (ref 1–1.03)
SQUAMOUS #/AREA URNS HPF: 0 /HPF
URN SPEC COLLECT METH UR: ABNORMAL
UROBILINOGEN UR STRIP-ACNC: NEGATIVE EU/DL
WBC # BLD AUTO: 10.18 K/UL (ref 3.9–12.7)
WBC #/AREA URNS HPF: 1 /HPF (ref 0–5)
YEAST URNS QL MICRO: ABNORMAL

## 2023-04-19 PROCEDURE — 63600175 PHARM REV CODE 636 W HCPCS: Performed by: EMERGENCY MEDICINE

## 2023-04-19 PROCEDURE — 80053 COMPREHEN METABOLIC PANEL: CPT | Performed by: EMERGENCY MEDICINE

## 2023-04-19 PROCEDURE — 99285 EMERGENCY DEPT VISIT HI MDM: CPT | Mod: 25

## 2023-04-19 PROCEDURE — 96374 THER/PROPH/DIAG INJ IV PUSH: CPT

## 2023-04-19 PROCEDURE — 85025 COMPLETE CBC W/AUTO DIFF WBC: CPT | Performed by: EMERGENCY MEDICINE

## 2023-04-19 PROCEDURE — 96361 HYDRATE IV INFUSION ADD-ON: CPT

## 2023-04-19 PROCEDURE — 81001 URINALYSIS AUTO W/SCOPE: CPT | Performed by: EMERGENCY MEDICINE

## 2023-04-19 PROCEDURE — 96375 TX/PRO/DX INJ NEW DRUG ADDON: CPT

## 2023-04-19 RX ORDER — ONDANSETRON 2 MG/ML
4 INJECTION INTRAMUSCULAR; INTRAVENOUS
Status: COMPLETED | OUTPATIENT
Start: 2023-04-19 | End: 2023-04-19

## 2023-04-19 RX ORDER — TAMSULOSIN HYDROCHLORIDE 0.4 MG/1
0.4 CAPSULE ORAL DAILY
Qty: 7 CAPSULE | Refills: 0 | Status: SHIPPED | OUTPATIENT
Start: 2023-04-19 | End: 2023-05-05

## 2023-04-19 RX ORDER — HYDROMORPHONE HYDROCHLORIDE 1 MG/ML
0.5 INJECTION, SOLUTION INTRAMUSCULAR; INTRAVENOUS; SUBCUTANEOUS
Status: COMPLETED | OUTPATIENT
Start: 2023-04-19 | End: 2023-04-19

## 2023-04-19 RX ORDER — HYDROCODONE BITARTRATE AND ACETAMINOPHEN 5; 325 MG/1; MG/1
1 TABLET ORAL EVERY 6 HOURS PRN
Qty: 16 TABLET | Refills: 0 | Status: SHIPPED | OUTPATIENT
Start: 2023-04-19 | End: 2023-05-05

## 2023-04-19 RX ORDER — SODIUM CHLORIDE, SODIUM LACTATE, POTASSIUM CHLORIDE, CALCIUM CHLORIDE 600; 310; 30; 20 MG/100ML; MG/100ML; MG/100ML; MG/100ML
1000 INJECTION, SOLUTION INTRAVENOUS
Status: DISCONTINUED | OUTPATIENT
Start: 2023-04-19 | End: 2023-04-19

## 2023-04-19 RX ORDER — ONDANSETRON 4 MG/1
4 TABLET, FILM COATED ORAL EVERY 6 HOURS PRN
Qty: 12 TABLET | Refills: 0 | Status: SHIPPED | OUTPATIENT
Start: 2023-04-19 | End: 2023-05-05

## 2023-04-19 RX ADMIN — ONDANSETRON 4 MG: 2 INJECTION INTRAMUSCULAR; INTRAVENOUS at 09:04

## 2023-04-19 RX ADMIN — HYDROMORPHONE HYDROCHLORIDE 0.5 MG: 0.5 INJECTION, SOLUTION INTRAMUSCULAR; INTRAVENOUS; SUBCUTANEOUS at 09:04

## 2023-04-19 RX ADMIN — SODIUM CHLORIDE, SODIUM LACTATE, POTASSIUM CHLORIDE, AND CALCIUM CHLORIDE 1000 ML: .6; .31; .03; .02 INJECTION, SOLUTION INTRAVENOUS at 09:04

## 2023-04-19 NOTE — ED PROVIDER NOTES
"Encounter Date: 4/19/2023       History     Chief Complaint   Patient presents with    Flank Pain     Left sided "since 4oclock"     78-year-old male with history of type 2 diabetes, hypertension, kidney stones.  Patient presents emergency department with complaint of left flank pain which began at 4:00 a.m. this morning.  Patient states was colicky in nature.  Denied fever, no vomiting, no recent illness, denies any other constitutional symptoms.  Presented with complaint of 6/10 left flank pain this morning.  Patient followed by  urology.    Review of patient's allergies indicates:  No Known Allergies  Past Medical History:   Diagnosis Date    Diabetes mellitus, type 2     Hypertension     Kidney stone      Past Surgical History:   Procedure Laterality Date    APPENDECTOMY  5/30/2020    Procedure: APPENDECTOMY;  Surgeon: Eusebio Gil III, MD;  Location: [facility];  Service: General;;    arm fracture surgery      FRACTURE SURGERY      Left arm    LAPAROSCOPIC APPENDECTOMY N/A 5/30/2020    Procedure: APPENDECTOMY, LAPAROSCOPIC VS OPEN;  Surgeon: Eusebio Gil III, MD;  Location: [facility];  Service: General;  Laterality: N/A;    Open Appendectomy w/ partial cecectomy   05/31/2020    Dr. Gil      No family history on file.  Social History     Tobacco Use    Smoking status: Never    Smokeless tobacco: Never   Substance Use Topics    Alcohol use: No    Drug use: No     Review of Systems    Physical Exam     Initial Vitals [04/19/23 0806]   BP Pulse Resp Temp SpO2   (!) 179/100 64 17 97.8 °F (36.6 °C) 98 %      MAP       --         Physical Exam    Nursing note and vitals reviewed.  Constitutional: He appears well-developed and well-nourished.   HENT:   Head: Normocephalic and atraumatic.   Nose: Nose normal.   Mouth/Throat: Oropharynx is clear and moist.   Eyes: Conjunctivae and EOM are normal. Pupils are equal, round, and reactive to light. No scleral icterus.   Neck: Neck supple.   Normal " range of motion.  Cardiovascular:  Normal rate, regular rhythm, normal heart sounds and intact distal pulses.     Exam reveals no gallop and no friction rub.       No murmur heard.  Pulmonary/Chest: No stridor. No respiratory distress.   Course bilateral breath sounds no adventitious sounds   Abdominal: Abdomen is soft. Bowel sounds are normal. He exhibits no mass. There is no abdominal tenderness. There is no rebound and no guarding.   Musculoskeletal:         General: No edema. Normal range of motion.      Cervical back: Normal range of motion and neck supple.     Lymphadenopathy:     He has no cervical adenopathy.   Neurological: He is alert and oriented to person, place, and time. He has normal strength and normal reflexes. No cranial nerve deficit or sensory deficit. GCS score is 15. GCS eye subscore is 4. GCS verbal subscore is 5. GCS motor subscore is 6.   Skin: Skin is warm and dry. Capillary refill takes less than 2 seconds. No rash noted.   Psychiatric: He has a normal mood and affect. His behavior is normal. Judgment and thought content normal.       ED Course   Procedures  Labs Reviewed   CBC W/ AUTO DIFFERENTIAL - Abnormal; Notable for the following components:       Result Value    Immature Granulocytes 0.8 (*)     Gran # (ANC) 8.3 (*)     Immature Grans (Abs) 0.08 (*)     Gran % 81.8 (*)     Lymph % 11.0 (*)     All other components within normal limits   COMPREHENSIVE METABOLIC PANEL - Abnormal; Notable for the following components:    CO2 20 (*)     Glucose 169 (*)     BUN 29 (*)     Creatinine 2.2 (*)     Anion Gap 17 (*)     eGFR 29.5 (*)     All other components within normal limits   URINALYSIS, REFLEX TO URINE CULTURE - Abnormal; Notable for the following components:    Protein, UA 2+ (*)     Glucose, UA 4+ (*)     Occult Blood UA 3+ (*)     All other components within normal limits    Narrative:     Specimen Source->Urine   URINALYSIS MICROSCOPIC - Abnormal; Notable for the following  components:    RBC, UA 5 (*)     All other components within normal limits    Narrative:     Specimen Source->Urine   URINALYSIS, REFLEX TO URINE CULTURE          Imaging Results              CT Renal Stone Study ABD Pelvis WO (Final result)  Result time 04/19/23 08:52:12      Final result by Yara Dotson MD (04/19/23 08:52:12)                   Narrative:    CMS MANDATED QUALITY DATA - CT RADIATION - 436    All CT scans at this facility utilize dose modulation, iterative reconstruction, and/or weight based dosing when appropriate to reduce radiation dose to as low as reasonably achievable.    HISTORY: Left flank pain    COMPARISON: 5/30/2020    FINDINGS: Noncontrast axial images were obtained. Nonenhanced study is tailored for the detection of urolithiasis, and is insensitive for abnormalities of the solid organs, vasculature and hollow viscera.    CT ABDOMEN: There is a stable 8 mm pleural-based nodule in the left lower lobe there are no infiltrates or pleural effusions.  There is no pericardial effusion.    The liver, spleen and pancreas have a normal noncontrast appearance. The gallbladder and adrenal glands are normal.    Kidneys: The kidneys are symmetric in size. There is mild left hydroureteronephrosis secondary to multiple left ureteral stones the largest in the mid to distal ureter measuring 4 mm. There is stranding in the left perinephric fat.  There is a 2 mm nonobstructing right renal stone.    There are no thick-walled or dilated bowel loops. There are postsurgical changes around the cecum.  There is no mesenteric or retroperitoneal adenopathy. The aorta is normal in caliber. The paraspinous soft tissues are normal. There is a small fat-containing midline abdominal wall hernia.    CT PELVIS: Bladder and prostate gland are normal. There are bilateral small inguinal hernias. There are no acute osseous abnormalities. There are degenerative changes of both hips.    IMPRESSION: Mild left  hydroureteronephrosis secondary to multiple mid left ureteral stones largest measuring 4 mm    2 mm nonobstructing right renal stone    Stable 8 mm subpleural nodule in the left lung base    Small bilateral inguinal hernias and fat-containing left periumbilical hernia    Electronically signed by:  Yara Dotson MD  4/19/2023 8:52 AM CDT Workstation: [id number]                                     Medications   HYDROmorphone injection 0.5 mg (0.5 mg Intravenous Given 4/19/23 0924)   ondansetron injection 4 mg (4 mg Intravenous Given 4/19/23 0923)   lactated ringers bolus 1,000 mL (0 mLs Intravenous Stopped 4/19/23 1229)     Medical Decision Making:   Initial Assessment:   78-year-old male with history of type 2 diabetes, hypertension, kidney stones.  Patient presents emergency department with complaint of left flank pain which began at 4:00 a.m. this morning.  Patient states was colicky in nature.  Denied fever, no vomiting, no recent illness, denies any other constitutional symptoms.  Presented with complaint of 6/10 left flank pain this morning.  Patient followed by  urology.    Differential Diagnosis:   Ureterolithiasis, pyelonephritis, cystitis, pneumonia, musculoskeletal back pain  Clinical Tests:   Lab Tests: Ordered and Reviewed  Radiological Study: Ordered and Reviewed  ED Management:  Patient seen evaluated emergency department.  Patient found with left flank pain in ED.  Patient on workup in emergency department CT found with nonobstructive ureteral stones on left side.  Measuring 2 and 4 mm.  Patient with mild hydroureteronephrosis.  Patient urinalysis with hematuria however without evidence of urinary tract infection.  Patient with known chronic renal insufficiency with baseline creatinine of 2.2.  Patient did receive good pain control emergency department.  Currently at this time patient is to follow-up with urology next week.  He is to return to emergency department problems persist  worsens or additional concerns.  Patient given Flomax, Norco, Zofran. in emergency department found with CT findings consistent with                        Clinical Impression:   Final diagnoses:  [R10.9] Left flank pain (Primary)  [N20.1] Ureterolithiasis        ED Disposition Condition    Discharge Stable          ED Prescriptions       Medication Sig Dispense Start Date End Date Auth. Provider    tamsulosin (FLOMAX) 0.4 mg Cap Take 1 capsule (0.4 mg total) by mouth once daily. for 7 days 7 capsule 4/19/2023 4/26/2023 Deni Monson MD    ondansetron (ZOFRAN) 4 MG tablet Take 1 tablet (4 mg total) by mouth every 6 (six) hours as needed for Nausea. 12 tablet 4/19/2023 -- Deni Monson MD    HYDROcodone-acetaminophen (NORCO) 5-325 mg per tablet Take 1 tablet by mouth every 6 (six) hours as needed for Pain. 16 tablet 4/19/2023 -- Deni Monson MD          Follow-up Information       Follow up With Specialties Details Why Contact Info    Romie Velez MD Urology Go on 4/21/2023 For recheck/continuing care    Patient scheduled 4.21.2023 @ 9:40 a.m. [address] [facility]  [address] [place] [id number]  [phone]               Deni Monson MD  04/19/23 0713

## 2023-04-19 NOTE — ED NOTES
"PRIVATE ROOM. EVEN AND NON LABORED RESPIRATIONS.  AIRWAY CLEAR.  PULSES REGULAR.  < 3" CAPILLARY REFILL. SKIN WDI.  MAEW.  NON DISTENDED FIRM ABDOMEN. ALERT, ORIENTED AND AMBULATORY. NAD AT THIS TIME.  CALL LIGHT IN REACH.   "

## 2023-04-19 NOTE — PLAN OF CARE
Patient scheduled with Dr Lopez / urology [phone] for Friday 4.21.2023 at 9:40 a.m. Added to AVS.

## 2023-04-21 ENCOUNTER — HOSPITAL ENCOUNTER (OUTPATIENT)
Dept: RADIOLOGY | Facility: HOSPITAL | Age: 78
Discharge: HOME | End: 2023-04-21
Attending: SPECIALIST
Payer: MEDICARE

## 2023-04-21 DIAGNOSIS — N20.0 URIC ACID NEPHROLITHIASIS: ICD-10-CM

## 2023-04-21 DIAGNOSIS — N20.0 URIC ACID NEPHROLITHIASIS: Primary | ICD-10-CM

## 2023-04-21 PROCEDURE — 74018 RADEX ABDOMEN 1 VIEW: CPT | Mod: TC,PO

## 2023-04-25 ENCOUNTER — HOSPITAL ENCOUNTER (OUTPATIENT)
Dept: RADIOLOGY | Facility: HOSPITAL | Age: 78
Discharge: HOME | End: 2023-04-25
Attending: SPECIALIST
Payer: MEDICARE

## 2023-04-25 DIAGNOSIS — N20.1 CALCULUS OF URETER: Primary | ICD-10-CM

## 2023-04-25 DIAGNOSIS — N20.1 CALCULUS OF URETER: ICD-10-CM

## 2023-04-25 PROCEDURE — 74018 RADEX ABDOMEN 1 VIEW: CPT | Mod: TC,PO

## 2023-04-27 ENCOUNTER — LAB VISIT (OUTPATIENT)
Dept: LAB | Facility: CLINIC | Age: 78
End: 2023-04-27
Payer: MEDICARE

## 2023-04-27 DIAGNOSIS — N18.4 CKD (CHRONIC KIDNEY DISEASE) STAGE 4, GFR 15-29 ML/MIN: ICD-10-CM

## 2023-04-27 LAB
ALBUMIN SERPL BCP-MCNC: 3.7 G/DL (ref 3.5–5.2)
ANION GAP SERPL CALC-SCNC: 9 MMOL/L (ref 8–16)
BUN SERPL-MCNC: 24 MG/DL (ref 8–23)
CALCIUM SERPL-MCNC: 9.4 MG/DL (ref 8.7–10.5)
CHLORIDE SERPL-SCNC: 104 MMOL/L (ref 95–110)
CO2 SERPL-SCNC: 25 MMOL/L (ref 23–29)
CREAT SERPL-MCNC: 2 MG/DL (ref 0.5–1.4)
CREAT UR-MCNC: 84.8 MG/DL (ref 23–375)
EST. GFR  (NO RACE VARIABLE): 33 ML/MIN/1.73 M^2
GLUCOSE SERPL-MCNC: 141 MG/DL (ref 70–110)
PHOSPHATE SERPL-MCNC: 3 MG/DL (ref 2.7–4.5)
POTASSIUM SERPL-SCNC: 4.7 MMOL/L (ref 3.5–5.1)
PROT UR-MCNC: 25 MG/DL (ref 0–15)
PROT/CREAT UR: 0.29 MG/G{CREAT} (ref 0–0.2)
PTH-INTACT SERPL-MCNC: 49.9 PG/ML (ref 9–77)
SODIUM SERPL-SCNC: 138 MMOL/L (ref 136–145)

## 2023-04-27 PROCEDURE — 83970 ASSAY OF PARATHORMONE: CPT | Performed by: INTERNAL MEDICINE

## 2023-04-27 PROCEDURE — 80069 RENAL FUNCTION PANEL: CPT | Performed by: INTERNAL MEDICINE

## 2023-04-27 PROCEDURE — 84156 ASSAY OF PROTEIN URINE: CPT | Performed by: INTERNAL MEDICINE

## 2023-05-04 ENCOUNTER — TELEPHONE (OUTPATIENT)
Dept: NEPHROLOGY | Facility: CLINIC | Age: 78
End: 2023-05-04
Payer: MEDICARE

## 2023-05-04 NOTE — TELEPHONE ENCOUNTER
Left message on identified voicemail. And also, directed pt. To read his mychart result message as well   Type 2 diabetes mellitus

## 2023-05-04 NOTE — TELEPHONE ENCOUNTER
----- Message from Scott Cano MD sent at 4/28/2023  3:31 PM CDT -----  Blood is improved and urine looks good.  Nice job!

## 2023-05-05 ENCOUNTER — OFFICE VISIT (OUTPATIENT)
Dept: NEPHROLOGY | Facility: CLINIC | Age: 78
End: 2023-05-05
Payer: MEDICARE

## 2023-05-05 DIAGNOSIS — N18.4 CKD (CHRONIC KIDNEY DISEASE) STAGE 4, GFR 15-29 ML/MIN: Primary | ICD-10-CM

## 2023-05-05 DIAGNOSIS — N28.1 ACQUIRED CYST OF KIDNEY: ICD-10-CM

## 2023-05-05 DIAGNOSIS — E11.22 TYPE 2 DIABETES MELLITUS WITH STAGE 4 CHRONIC KIDNEY DISEASE, WITHOUT LONG-TERM CURRENT USE OF INSULIN: ICD-10-CM

## 2023-05-05 DIAGNOSIS — N20.0 KIDNEY STONE: ICD-10-CM

## 2023-05-05 DIAGNOSIS — N18.4 TYPE 2 DIABETES MELLITUS WITH STAGE 4 CHRONIC KIDNEY DISEASE, WITHOUT LONG-TERM CURRENT USE OF INSULIN: ICD-10-CM

## 2023-05-05 DIAGNOSIS — I10 PRIMARY HYPERTENSION: ICD-10-CM

## 2023-05-05 PROCEDURE — 1160F RVW MEDS BY RX/DR IN RCRD: CPT | Mod: CPTII,95,, | Performed by: INTERNAL MEDICINE

## 2023-05-05 PROCEDURE — 1159F PR MEDICATION LIST DOCUMENTED IN MEDICAL RECORD: ICD-10-PCS | Mod: CPTII,95,, | Performed by: INTERNAL MEDICINE

## 2023-05-05 PROCEDURE — 99214 OFFICE O/P EST MOD 30 MIN: CPT | Mod: 95,,, | Performed by: INTERNAL MEDICINE

## 2023-05-05 PROCEDURE — 99214 PR OFFICE/OUTPT VISIT, EST, LEVL IV, 30-39 MIN: ICD-10-PCS | Mod: 95,,, | Performed by: INTERNAL MEDICINE

## 2023-05-05 PROCEDURE — 1159F MED LIST DOCD IN RCRD: CPT | Mod: CPTII,95,, | Performed by: INTERNAL MEDICINE

## 2023-05-05 PROCEDURE — 1160F PR REVIEW ALL MEDS BY PRESCRIBER/CLIN PHARMACIST DOCUMENTED: ICD-10-PCS | Mod: CPTII,95,, | Performed by: INTERNAL MEDICINE

## 2023-05-05 NOTE — PROGRESS NOTES
Subjective:       Patient ID: Ruy Ramos Jr. is a 78 y.o. White male who presents for return patient evaluation for chronic renal failure.    The patient location is:  Patient Home   The chief complaint leading to consultation is: ckd  Visit type: Virtual visit with synchronous audio and video  Total time spent with patient: 15 minutes  Each patient to whom he or she provides medical services by telemedicine is:  (1) informed of the relationship between the physician and patient and the respective role of any other health care provider with respect to management of the patient; and (2) notified that he or she may decline to receive medical services by telemedicine and may withdraw from such care at any time.       He had a kidney a kidney stone on April 19th, 2 mm and 4 mm.   He had COVID in August on 2020.  His blood glucose has been well controlled.   He gave his stones to Dr. Mejias.      Review of Systems   Constitutional:  Positive for fatigue. Negative for appetite change, chills and fever.   HENT:  Negative for congestion.    Eyes:  Positive for visual disturbance.   Respiratory:  Positive for cough (chronic since COVID) and shortness of breath (with exertion).    Cardiovascular:  Negative for chest pain and leg swelling.   Gastrointestinal:  Negative for abdominal pain, diarrhea, nausea and vomiting.   Genitourinary:  Negative for difficulty urinating, dysuria and hematuria.   Musculoskeletal:  Positive for arthralgias (B shoulders and fingers). Negative for myalgias.   Skin:  Negative for rash.   Neurological:  Positive for numbness (feet). Negative for dizziness and headaches.   Psychiatric/Behavioral:  Negative for sleep disturbance.        The past medical, family and social histories were reviewed for this encounter.     The patient's last visit with me was on 2/2/2023.     There were no vitals taken for this visit.    Objective:      Physical Exam  Vitals reviewed.   Constitutional:        General: He is not in acute distress.     Appearance: He is well-developed.   HENT:      Head: Normocephalic and atraumatic.   Eyes:      General: No scleral icterus.  Pulmonary:      Effort: Pulmonary effort is normal. No respiratory distress.   Neurological:      Mental Status: He is alert and oriented to person, place, and time.   Psychiatric:         Mood and Affect: Mood normal.         Behavior: Behavior normal.       Assessment:       1. CKD (chronic kidney disease) stage 4, GFR 15-29 ml/min    2. Primary hypertension    3. Type 2 diabetes mellitus with stage 4 chronic kidney disease, without long-term current use of insulin    4. Acquired cyst of kidney    5. Kidney stone        Lab Results   Component Value Date    CREATININE 2.0 (H) 04/27/2023    BUN 24 (H) 04/27/2023     04/27/2023    K 4.7 04/27/2023     04/27/2023    CO2 25 04/27/2023     Lab Results   Component Value Date    PTH 49.9 04/27/2023    CALCIUM 9.4 04/27/2023    PHOS 3.0 04/27/2023     Lab Results   Component Value Date    HCT 48.3 04/19/2023     Prot/Creat Ratio, Urine   Date Value Ref Range Status   04/27/2023 0.29 (H) 0.00 - 0.20 Final   02/09/2023 0.25 (H) 0.00 - 0.20 Final   02/01/2023 0.34 (H) 0.00 - 0.20 Final     Plan:   Return to clinic in 3 months.  Labs for next visit include rp, pth, upc per standing orders.    Baseline creatinine is 1.5-1.7 prior to 2020.  He has a new baseline since his COVID infection in August of 2020 of 2.0-2.3.  PTH is 49 with a calcium of 9.4.  Renal US shows R 9.2 cm L 9.4 cm.  He sees Urology in [place].  UPC is 0.29 on an ARB.  Your blood pressure is controlled on your current medications.  Keep in mind that weight loss often improves blood pressure.  If you do diet and lose weight we will likely need to adjust your medications.  The effects of diabetes as it relates to kidney function was discussed.  As for the control of your blood sugar, please follow up with your PCP or  Endocrinology.  We discussed kidney cysts and that they are common (occur in 50% of patients over 50 years old).  We do like to monitor them with repeat ultrasound ever 1-2 years to document stability.  He did have a ruptured appendix in May of 2020 which could have been the event that affected his kidney function as well as COVID infection that same year in August.  I will send this note to Dr. Mejias to ask which type of stones he had.

## 2023-06-14 ENCOUNTER — LAB VISIT (OUTPATIENT)
Dept: LAB | Facility: CLINIC | Age: 78
End: 2023-06-14
Payer: MEDICARE

## 2023-06-14 ENCOUNTER — OFFICE VISIT (OUTPATIENT)
Dept: FAMILY MEDICINE | Facility: CLINIC | Age: 78
End: 2023-06-14
Payer: MEDICARE

## 2023-06-14 ENCOUNTER — PATIENT MESSAGE (OUTPATIENT)
Dept: ADMINISTRATIVE | Facility: HOSPITAL | Age: 78
End: 2023-06-14
Payer: MEDICARE

## 2023-06-14 VITALS
BODY MASS INDEX: 27.13 KG/M2 | OXYGEN SATURATION: 97 % | HEART RATE: 53 BPM | DIASTOLIC BLOOD PRESSURE: 80 MMHG | SYSTOLIC BLOOD PRESSURE: 112 MMHG | TEMPERATURE: 98 F | WEIGHT: 183.19 LBS | HEIGHT: 69 IN

## 2023-06-14 DIAGNOSIS — I77.9 CAROTID ARTERY DISEASE, UNSPECIFIED LATERALITY, UNSPECIFIED TYPE: ICD-10-CM

## 2023-06-14 DIAGNOSIS — E11.8 CONTROLLED TYPE 2 DIABETES MELLITUS WITH COMPLICATION, WITHOUT LONG-TERM CURRENT USE OF INSULIN: ICD-10-CM

## 2023-06-14 DIAGNOSIS — I10 ESSENTIAL HYPERTENSION: ICD-10-CM

## 2023-06-14 DIAGNOSIS — E08.21 DIABETIC NEPHROPATHY ASSOCIATED WITH DIABETES MELLITUS DUE TO UNDERLYING CONDITION: Primary | ICD-10-CM

## 2023-06-14 DIAGNOSIS — E78.5 HYPERLIPIDEMIA, UNSPECIFIED HYPERLIPIDEMIA TYPE: ICD-10-CM

## 2023-06-14 DIAGNOSIS — E08.21 DIABETIC NEPHROPATHY ASSOCIATED WITH DIABETES MELLITUS DUE TO UNDERLYING CONDITION: ICD-10-CM

## 2023-06-14 DIAGNOSIS — Z12.5 SCREENING FOR PROSTATE CANCER: ICD-10-CM

## 2023-06-14 LAB
ALBUMIN SERPL BCP-MCNC: 3.7 G/DL (ref 3.5–5.2)
ALBUMIN/CREAT UR: 110.2 UG/MG (ref 0–30)
ALP SERPL-CCNC: 104 U/L (ref 55–135)
ALT SERPL W/O P-5'-P-CCNC: 22 U/L (ref 10–44)
ANION GAP SERPL CALC-SCNC: 7 MMOL/L (ref 8–16)
AST SERPL-CCNC: 19 U/L (ref 10–40)
BASOPHILS # BLD AUTO: 0.07 K/UL (ref 0–0.2)
BASOPHILS NFR BLD: 1 % (ref 0–1.9)
BILIRUB SERPL-MCNC: 0.6 MG/DL (ref 0.1–1)
BUN SERPL-MCNC: 35 MG/DL (ref 8–23)
CALCIUM SERPL-MCNC: 9.3 MG/DL (ref 8.7–10.5)
CHLORIDE SERPL-SCNC: 110 MMOL/L (ref 95–110)
CHOLEST SERPL-MCNC: 179 MG/DL (ref 120–199)
CHOLEST/HDLC SERPL: 5.1 {RATIO} (ref 2–5)
CO2 SERPL-SCNC: 22 MMOL/L (ref 23–29)
COMPLEXED PSA SERPL-MCNC: 0.65 NG/ML (ref 0–4)
CREAT SERPL-MCNC: 2 MG/DL (ref 0.5–1.4)
CREAT UR-MCNC: 88 MG/DL (ref 23–375)
DIFFERENTIAL METHOD: ABNORMAL
EOSINOPHIL # BLD AUTO: 0.1 K/UL (ref 0–0.5)
EOSINOPHIL NFR BLD: 1.5 % (ref 0–8)
ERYTHROCYTE [DISTWIDTH] IN BLOOD BY AUTOMATED COUNT: 14.6 % (ref 11.5–14.5)
EST. GFR  (NO RACE VARIABLE): 33 ML/MIN/1.73 M^2
ESTIMATED AVG GLUCOSE: 143 MG/DL (ref 68–131)
GLUCOSE SERPL-MCNC: 131 MG/DL (ref 70–110)
HBA1C MFR BLD: 6.6 % (ref 4–5.6)
HCT VFR BLD AUTO: 42.6 % (ref 40–54)
HDLC SERPL-MCNC: 35 MG/DL (ref 40–75)
HDLC SERPL: 19.6 % (ref 20–50)
HGB BLD-MCNC: 13.5 G/DL (ref 14–18)
IMM GRANULOCYTES # BLD AUTO: 0.06 K/UL (ref 0–0.04)
IMM GRANULOCYTES NFR BLD AUTO: 0.9 % (ref 0–0.5)
LDLC SERPL CALC-MCNC: 81.8 MG/DL (ref 63–159)
LYMPHOCYTES # BLD AUTO: 1.8 K/UL (ref 1–4.8)
LYMPHOCYTES NFR BLD: 25.8 % (ref 18–48)
MCH RBC QN AUTO: 29.7 PG (ref 27–31)
MCHC RBC AUTO-ENTMCNC: 31.7 G/DL (ref 32–36)
MCV RBC AUTO: 94 FL (ref 82–98)
MICROALBUMIN UR DL<=1MG/L-MCNC: 97 UG/ML
MONOCYTES # BLD AUTO: 0.6 K/UL (ref 0.3–1)
MONOCYTES NFR BLD: 9.3 % (ref 4–15)
NEUTROPHILS # BLD AUTO: 4.2 K/UL (ref 1.8–7.7)
NEUTROPHILS NFR BLD: 61.5 % (ref 38–73)
NONHDLC SERPL-MCNC: 144 MG/DL
NRBC BLD-RTO: 0 /100 WBC
PLATELET # BLD AUTO: 197 K/UL (ref 150–450)
PMV BLD AUTO: 11.3 FL (ref 9.2–12.9)
POTASSIUM SERPL-SCNC: 4.7 MMOL/L (ref 3.5–5.1)
PROT SERPL-MCNC: 7 G/DL (ref 6–8.4)
RBC # BLD AUTO: 4.55 M/UL (ref 4.6–6.2)
SODIUM SERPL-SCNC: 139 MMOL/L (ref 136–145)
TRIGL SERPL-MCNC: 311 MG/DL (ref 30–150)
TSH SERPL DL<=0.005 MIU/L-ACNC: 1 UIU/ML (ref 0.4–4)
WBC # BLD AUTO: 6.89 K/UL (ref 3.9–12.7)

## 2023-06-14 PROCEDURE — 80053 COMPREHEN METABOLIC PANEL: CPT | Performed by: FAMILY MEDICINE

## 2023-06-14 PROCEDURE — 3288F PR FALLS RISK ASSESSMENT DOCUMENTED: ICD-10-PCS | Mod: CPTII,ICN,, | Performed by: FAMILY MEDICINE

## 2023-06-14 PROCEDURE — 1101F PR PT FALLS ASSESS DOC 0-1 FALLS W/OUT INJ PAST YR: ICD-10-PCS | Mod: CPTII,ICN,, | Performed by: FAMILY MEDICINE

## 2023-06-14 PROCEDURE — 80061 LIPID PANEL: CPT | Performed by: FAMILY MEDICINE

## 2023-06-14 PROCEDURE — 99214 PR OFFICE/OUTPT VISIT, EST, LEVL IV, 30-39 MIN: ICD-10-PCS | Mod: ICN,,, | Performed by: FAMILY MEDICINE

## 2023-06-14 PROCEDURE — 3074F SYST BP LT 130 MM HG: CPT | Mod: CPTII,ICN,, | Performed by: FAMILY MEDICINE

## 2023-06-14 PROCEDURE — 1126F PR PAIN SEVERITY QUANTIFIED, NO PAIN PRESENT: ICD-10-PCS | Mod: CPTII,ICN,, | Performed by: FAMILY MEDICINE

## 2023-06-14 PROCEDURE — 84153 ASSAY OF PSA TOTAL: CPT | Performed by: FAMILY MEDICINE

## 2023-06-14 PROCEDURE — 82570 ASSAY OF URINE CREATININE: CPT | Performed by: FAMILY MEDICINE

## 2023-06-14 PROCEDURE — 3074F PR MOST RECENT SYSTOLIC BLOOD PRESSURE < 130 MM HG: ICD-10-PCS | Mod: CPTII,ICN,, | Performed by: FAMILY MEDICINE

## 2023-06-14 PROCEDURE — 99214 OFFICE O/P EST MOD 30 MIN: CPT | Mod: ICN,,, | Performed by: FAMILY MEDICINE

## 2023-06-14 PROCEDURE — 84443 ASSAY THYROID STIM HORMONE: CPT | Performed by: FAMILY MEDICINE

## 2023-06-14 PROCEDURE — 85025 COMPLETE CBC W/AUTO DIFF WBC: CPT | Performed by: FAMILY MEDICINE

## 2023-06-14 PROCEDURE — 1126F AMNT PAIN NOTED NONE PRSNT: CPT | Mod: CPTII,ICN,, | Performed by: FAMILY MEDICINE

## 2023-06-14 PROCEDURE — 83036 HEMOGLOBIN GLYCOSYLATED A1C: CPT | Performed by: FAMILY MEDICINE

## 2023-06-14 PROCEDURE — 1101F PT FALLS ASSESS-DOCD LE1/YR: CPT | Mod: CPTII,ICN,, | Performed by: FAMILY MEDICINE

## 2023-06-14 PROCEDURE — 3288F FALL RISK ASSESSMENT DOCD: CPT | Mod: CPTII,ICN,, | Performed by: FAMILY MEDICINE

## 2023-06-14 PROCEDURE — 1159F PR MEDICATION LIST DOCUMENTED IN MEDICAL RECORD: ICD-10-PCS | Mod: CPTII,ICN,, | Performed by: FAMILY MEDICINE

## 2023-06-14 PROCEDURE — 3079F DIAST BP 80-89 MM HG: CPT | Mod: CPTII,ICN,, | Performed by: FAMILY MEDICINE

## 2023-06-14 PROCEDURE — 3079F PR MOST RECENT DIASTOLIC BLOOD PRESSURE 80-89 MM HG: ICD-10-PCS | Mod: CPTII,ICN,, | Performed by: FAMILY MEDICINE

## 2023-06-14 PROCEDURE — 1159F MED LIST DOCD IN RCRD: CPT | Mod: CPTII,ICN,, | Performed by: FAMILY MEDICINE

## 2023-06-14 RX ORDER — PRAVASTATIN SODIUM 10 MG/1
10 TABLET ORAL DAILY
Qty: 90 TABLET | Refills: 3 | Status: SHIPPED | OUTPATIENT
Start: 2023-06-14 | End: 2024-02-05

## 2023-06-14 NOTE — PROGRESS NOTES
Subjective:       Patient ID: Ruy Ramos Jr. is a 78 y.o. male.    Chief Complaint: Follow-up, Hypertension, and Diabetes (Pt here for 6 month check up/Pt is fasting)    Mr. Ruy Ramos Jr is an 78-year-old male who is here for his routine medical follow-up on diabetes, stage 3 chronic kidney disease, hypertension, diabetic neuropathy, atrial fibrillation.  Mr. Ramos is Fasting today and will need labs.      His last labs were reviewed and are as follows:    04/19/2023   1. CBC within normal limits  04/27/2023   1. CMP remarkable for a BUN of 24, creatinine 2.0, GFR 33, glucose 141, PTH 49.9  09/16/2022  1. Hemoglobin A1c 6.2%    Mr. Ramos has the following specialists  1. Cardiology, Dr. Crisostomo  2. Urologist, Dr. Gilberto Mcmahan  3. Nephrologist, Dr. Cano   4. Ophthalmology,  Mr. Ramos has no specific complaints today, but he did have an ER visit approximately 1 month ago for renal stones    Follow-up  Associated symptoms include fatigue and headaches. Pertinent negatives include no chest pain, coughing, nausea or weakness.   Hypertension  Associated symptoms include headaches. Pertinent negatives include no chest pain, palpitations or shortness of breath.   Review of Systems   Constitutional:  Positive for fatigue.   HENT:  Positive for postnasal drip.    Respiratory:  Negative for cough and shortness of breath.    Cardiovascular:  Negative for chest pain and palpitations.   Gastrointestinal:  Negative for nausea.   Genitourinary:  Positive for frequency.        Nocturia, Recent renal stones   Musculoskeletal:  Negative for gait problem.   Allergic/Immunologic: Negative for environmental allergies.   Neurological:  Positive for headaches. Negative for weakness.        Had headaches after his renal stone incident, while in ER   Psychiatric/Behavioral:  Negative for dysphoric mood. The patient is not nervous/anxious.      Patient Active Problem List   Diagnosis    Nephrolithiasis     Kidney stone    Atrial fibrillation    Diabetic nephropathy associated with diabetes mellitus due to underlying condition    Carotid atherosclerosis    Diabetic neuropathy    Primary hypertension    History of carotid endarterectomy    Hyperlipidemia    Type 2 diabetes mellitus with stage 3b chronic kidney disease, without long-term current use of insulin    Body mass index (BMI) of 25.0 to 29.9    Stage 3b chronic kidney disease    Acquired cyst of kidney       Objective:      Physical Exam  Constitutional:       Appearance: Normal appearance.   HENT:      Head: Normocephalic.      Mouth/Throat:      Mouth: Mucous membranes are moist.   Eyes:      Pupils: Pupils are equal, round, and reactive to light.   Cardiovascular:      Rate and Rhythm: Normal rate and regular rhythm.      Pulses: Normal pulses.   Pulmonary:      Effort: Pulmonary effort is normal.      Breath sounds: Normal breath sounds.   Abdominal:      General: Abdomen is flat.   Skin:     General: Skin is warm.   Neurological:      General: No focal deficit present.      Mental Status: He is alert and oriented to person, place, and time. Mental status is at baseline.   Psychiatric:         Mood and Affect: Mood normal.         Behavior: Behavior normal.         Thought Content: Thought content normal.       Lab Results   Component Value Date    WBC 10.18 04/19/2023    HGB 15.8 04/19/2023    HCT 48.3 04/19/2023     04/19/2023    CHOL 204 (H) 12/12/2022    TRIG 202 (H) 12/12/2022    HDL 42 12/12/2022    ALT 24 04/19/2023    AST 25 04/19/2023     04/27/2023    K 4.7 04/27/2023     04/27/2023    CREATININE 2.0 (H) 04/27/2023    BUN 24 (H) 04/27/2023    CO2 25 04/27/2023    TSH 1.310 06/03/2020    PSA 0.75 06/03/2022    HGBA1C 6.2 (H) 09/16/2022     The ASCVD Risk score (Alem DK, et al., 2019) failed to calculate for the following reasons:    The 2019 ASCVD risk score is only valid for ages 40 to 79  Visit Vitals  /80 (BP Location:  "Left arm, Patient Position: Sitting, BP Method: Medium (Manual))   Pulse (!) 53   Temp 98.3 °F (36.8 °C)   Ht 5' 9" (1.753 m)   Wt 83.1 kg (183 lb 3.2 oz)   SpO2 97%   BMI 27.05 kg/m²      Assessment:       1. Diabetic nephropathy associated with diabetes mellitus due to underlying condition    2. Carotid artery disease, unspecified laterality, unspecified type    3. Essential hypertension    4. Hyperlipidemia, unspecified hyperlipidemia type    5. Controlled type 2 diabetes mellitus with complication, without long-term current use of insulin    6. Screening for prostate cancer        Plan:       1. Diabetic nephropathy associated with diabetes mellitus due to underlying condition  -     Hemoglobin A1C; Future; Expected date: 06/14/2023    2. Carotid artery disease, unspecified laterality, unspecified type  -     pravastatin (PRAVACHOL) 10 MG tablet; Take 1 tablet (10 mg total) by mouth once daily.  Dispense: 90 tablet; Refill: 3  -     Lipid Panel; Future; Expected date: 06/14/2023    3. Essential hypertension  -     CBC Auto Differential; Future; Expected date: 06/14/2023  -     Comprehensive Metabolic Panel; Future; Expected date: 06/14/2023    4. Hyperlipidemia, unspecified hyperlipidemia type  -     Lipid Panel; Future; Expected date: 06/14/2023    5. Controlled type 2 diabetes mellitus with complication, without long-term current use of insulin  -     Hemoglobin A1C; Future; Expected date: 06/14/2023  -     TSH; Future; Expected date: 06/14/2023  -     Microalbumin/Creatinine Ratio, Urine; Future; Expected date: 06/14/2023    6. Screening for prostate cancer  -     PSA, Screening; Future; Expected date: 06/14/2023       Follow up in about 6 months (around 12/14/2023), or if symptoms worsen or fail to improve.      Future Appointments       Date Provider Specialty Appt Notes    6/14/2023  Lab labs    12/11/2023 Lynn Melendez MD Family Medicine 6 month ck up htn dm             "

## 2023-06-14 NOTE — PROGRESS NOTES
"Subjective:       Patient ID: Ruy Ramos Jr. is a 78 y.o. male.    Chief Complaint: Follow-up, Hypertension, and Diabetes (Pt here for 6 month check up/Pt is fasting)    Follow-up    Hypertension    Diabetes    Review of Systems    Patient Active Problem List   Diagnosis    Nephrolithiasis    Kidney stone    Atrial fibrillation    Diabetic nephropathy associated with diabetes mellitus due to underlying condition    Carotid atherosclerosis    Diabetic neuropathy    Primary hypertension    History of carotid endarterectomy    Hyperlipidemia    Type 2 diabetes mellitus with stage 3b chronic kidney disease, without long-term current use of insulin    Body mass index (BMI) of 25.0 to 29.9    Stage 3b chronic kidney disease    Acquired cyst of kidney       Objective:      Physical Exam    Lab Results   Component Value Date    WBC 10.18 04/19/2023    HGB 15.8 04/19/2023    HCT 48.3 04/19/2023     04/19/2023    CHOL 204 (H) 12/12/2022    TRIG 202 (H) 12/12/2022    HDL 42 12/12/2022    ALT 24 04/19/2023    AST 25 04/19/2023     04/27/2023    K 4.7 04/27/2023     04/27/2023    CREATININE 2.0 (H) 04/27/2023    BUN 24 (H) 04/27/2023    CO2 25 04/27/2023    TSH 1.310 06/03/2020    PSA 0.75 06/03/2022    HGBA1C 6.2 (H) 09/16/2022     The ASCVD Risk score (Alem DK, et al., 2019) failed to calculate for the following reasons:    The 2019 ASCVD risk score is only valid for ages 40 to 79  Visit Vitals  /80 (BP Location: Left arm, Patient Position: Sitting, BP Method: Medium (Manual))   Pulse (!) 53   Temp 98.3 °F (36.8 °C)   Ht 5' 9" (1.753 m)   Wt 83.1 kg (183 lb 3.2 oz)   SpO2 97%   BMI 27.05 kg/m²      Assessment:       1. Diabetic nephropathy associated with diabetes mellitus due to underlying condition    2. Carotid artery disease, unspecified laterality, unspecified type    3. Essential hypertension    4. Hyperlipidemia, unspecified hyperlipidemia type    5. Controlled type 2 diabetes " This condition is managed by Specialist.  Key COPD Medications             PROAIR HFA 90 mcg/actuation inhaler     albuterol (PROVENTIL VENTOLIN) 2.5 mg /3 mL (0.083 %) nebulizer solution 3 mL by Nebulization route every four (4) hours as needed for Wheezing or Shortness of Breath. ALBUTEROL SULFATE (PROAIR HFA IN) Take 2 puffs by inhalation four (4) times daily as needed. ADVAIR DISKUS 100-50 mcg/Dose diskus inhaler Take 1 puff by inhalation two (2) times a day.         Lab Results   Component Value Date/Time    WBC 8.4 05/25/2017 11:46 AM    HGB 15.7 05/25/2017 11:46 AM    HCT 46.3 05/25/2017 11:46 AM    PLATELET 980 90/79/5765 11:46 AM mellitus with complication, without long-term current use of insulin    6. Screening for prostate cancer        Plan:       1. Diabetic nephropathy associated with diabetes mellitus due to underlying condition  -     Hemoglobin A1C; Future; Expected date: 06/14/2023    2. Carotid artery disease, unspecified laterality, unspecified type  -     pravastatin (PRAVACHOL) 10 MG tablet; Take 1 tablet (10 mg total) by mouth once daily.  Dispense: 90 tablet; Refill: 3  -     Lipid Panel; Future; Expected date: 06/14/2023    3. Essential hypertension  -     CBC Auto Differential; Future; Expected date: 06/14/2023  -     Comprehensive Metabolic Panel; Future; Expected date: 06/14/2023    4. Hyperlipidemia, unspecified hyperlipidemia type  -     Lipid Panel; Future; Expected date: 06/14/2023    5. Controlled type 2 diabetes mellitus with complication, without long-term current use of insulin  -     Hemoglobin A1C; Future; Expected date: 06/14/2023  -     TSH; Future; Expected date: 06/14/2023  -     Microalbumin/Creatinine Ratio, Urine; Future; Expected date: 06/14/2023    6. Screening for prostate cancer  -     PSA, Screening; Future; Expected date: 06/14/2023       Follow up in about 6 months (around 12/14/2023), or if symptoms worsen or fail to improve.

## 2023-07-25 ENCOUNTER — PATIENT MESSAGE (OUTPATIENT)
Dept: FAMILY MEDICINE | Facility: CLINIC | Age: 78
End: 2023-07-25
Payer: MEDICARE

## 2023-07-25 DIAGNOSIS — E78.2 MIXED HYPERLIPIDEMIA: ICD-10-CM

## 2023-07-25 DIAGNOSIS — Z98.890 HISTORY OF CAROTID ENDARTERECTOMY: ICD-10-CM

## 2023-07-25 RX ORDER — EZETIMIBE 10 MG/1
10 TABLET ORAL DAILY
Qty: 90 TABLET | Refills: 3 | Status: SHIPPED | OUTPATIENT
Start: 2023-07-25

## 2023-07-25 RX ORDER — METOPROLOL SUCCINATE 25 MG/1
25 TABLET, EXTENDED RELEASE ORAL DAILY
Qty: 90 TABLET | Refills: 3 | Status: SHIPPED | OUTPATIENT
Start: 2023-07-25

## 2023-08-17 ENCOUNTER — PATIENT MESSAGE (OUTPATIENT)
Dept: ADMINISTRATIVE | Facility: HOSPITAL | Age: 78
End: 2023-08-17
Payer: MEDICARE

## 2023-08-25 ENCOUNTER — PATIENT OUTREACH (OUTPATIENT)
Dept: ADMINISTRATIVE | Facility: HOSPITAL | Age: 78
End: 2023-08-25
Payer: MEDICARE

## 2023-08-25 ENCOUNTER — PATIENT MESSAGE (OUTPATIENT)
Dept: ADMINISTRATIVE | Facility: HOSPITAL | Age: 78
End: 2023-08-25
Payer: MEDICARE

## 2023-08-25 NOTE — PROGRESS NOTES

## 2023-08-25 NOTE — LETTER
AUTHORIZATION FOR RELEASE OF   CONFIDENTIAL INFORMATION    Dear Elli Ramos MD,    We are seeing Ruy Ramos Jr., date of birth 1945, in the clinic at [facility] MEDICINE 1ST FLOOR. Lynn Melendez MD is the patient's PCP. Ruy Ramos Jr. has an outstanding lab/procedure at the time we reviewed his chart. In order to help keep his health information updated, he has authorized us to request the following medical record(s):        (  )  MAMMOGRAM                                      (  )  COLONOSCOPY      (  )  PAP SMEAR                                          (  )  OUTSIDE LAB RESULTS     (  )  DEXA SCAN                                          (X) DIABETIC EYE EXAM            (  )  FOOT EXAM                                          (  )  ENTIRE RECORD     (  )  OUTSIDE IMMUNIZATIONS                 (  )  _______________         Please fax records to [facility], Cole, Elizabeth D, MD, [phone]    If you have any questions, please contact Johann Rogel LPN Care Coordinator  at [phone].            Patient Name: Ruy Ramos Jr.  : 1945  Patient Phone #: [phone]

## 2023-08-28 ENCOUNTER — PATIENT OUTREACH (OUTPATIENT)
Dept: ADMINISTRATIVE | Facility: HOSPITAL | Age: 78
End: 2023-08-28
Payer: MEDICARE

## 2023-08-28 NOTE — PROGRESS NOTES
Population Health Chart Review & Patient Outreach Details:     Reason for Outreach Encounter:     [x]  Non-Compliant Report   []  Payor Report ([employer], PHN, BCBS, MSSP, MCIP, UHC, etc.)   []  Pre-Visit Chart Review     Updates Requested / Reviewed:     []  Care Everywhere    []     []  External Sources ([facility], [facility], [facility], etc.)   [x]  Care Team Updated    Patient Outreach Method:    []  Telephone Outreach Completed   [] Successful   [] Left Voicemail   [] Unable to Contact (wrong number, no voicemail)  []  [email or web address] Portal Outreach Sent  []  Letter Outreach Mailed  []  Fax Sent for External Records  [x]  External Records Upload    Health Maintenance Topics Addressed and Outreach Outcomes / Actions Taken:        []      Breast Cancer Screening []  Mammo Scheduled      []  External Records Requested     []  Added Reminder to Complete to Upcoming Primary Care Appt Notes     []  Patient Declined     []  Patient Will Call Back to Schedule     []  Patient Will Schedule with External Provider / Order Routed if Applicable             []       Cervical Cancer Screening []  Pap Scheduled      []  External Records Requested     []  Added Reminder to Complete to Upcoming Primary Care Appt Notes     []  Patient Declined     []  Patient Will Call Back to Schedule     []  Patient Will Schedule with External Provider               []          Colorectal Cancer Screening []  Colonoscopy Case Request or Referral Placed     []  External Records Requested     []  Added Reminder to Complete to Upcoming Primary Care Appt Notes     []  Patient Declined     []  Patient Will Call Back to Schedule     []  Patient Will Schedule with External Provider     []  Fit Kit Mailed (add the SmartPhrase under additional notes)     []  Reminded Patient to Complete Home Test             [x]      Diabetic Eye Exam []  Eye Camera Scheduled or Optometry Referral Placed     []  External Records Requested     []  Added Reminder to Complete to  Upcoming Primary Care Appt Notes     []  Patient Declined     []  Patient Will Call Back to Schedule     []  Patient Will Schedule with External Provider             []      Blood Pressure Control []  Primary Care Follow Up Visit Scheduled     []  Remote Blood Pressure Reading Captured     []  Added Reminder to Complete to Upcoming Primary Care Appt Notes     []  Patient Declined     []  Patient Will Call Back / Patient Will Send Portal Message with Reading     []  Patient Will Call Back to Schedule Provider Visit             []       HbA1c & Other Labs []  Lab Appt Scheduled for Due Labs     []  Primary Care Follow Up Visit Scheduled      []  Reminded Patient to Complete Home Test     []  Added Reminder to Complete to Upcoming Primary Care Appt Notes     []  Patient Declined     []  Patient Will Call Back to Schedule     []  Patient Will Schedule with External Provider / Order Routed if Applicable           []    Schedule Primary Care Appt []  Primary Care Appt Scheduled     []  Patient Declined     []  Patient Will Call Back to Schedule     []  Pt Established with External Provider & Updated Care Team             []      Medication Adherence []  Primary Care Appointment Scheduled     []  Added Reminder to Upcoming Primary Care Appt Notes     []  Patient Reminded to  Prescription     []  Patient Declined, Provider Notified if Needed     []  Sent Provider Message to Review and/or Add Exclusion to Problem List             []      Osteoporosis Screening []  DXA Appointment Scheduled     []  External Records Requested     []  Added Reminder to Complete to Upcoming Primary Care Appt Notes     []  Patient Declined     []  Patient Will Call Back to Schedule     []  Patient Will Schedule with External Provider / Order Routed if Applicable     Additional Care Coordinator Notes:         Further Action Needed If Patient Returns Outreach:

## 2023-09-04 ENCOUNTER — PATIENT MESSAGE (OUTPATIENT)
Dept: FAMILY MEDICINE | Facility: CLINIC | Age: 78
End: 2023-09-04
Payer: MEDICARE

## 2023-11-01 ENCOUNTER — PATIENT MESSAGE (OUTPATIENT)
Dept: FAMILY MEDICINE | Facility: CLINIC | Age: 78
End: 2023-11-01
Payer: MEDICARE

## 2023-11-01 DIAGNOSIS — E11.42 DIABETIC POLYNEUROPATHY ASSOCIATED WITH TYPE 2 DIABETES MELLITUS: ICD-10-CM

## 2023-11-01 RX ORDER — PIOGLITAZONEHYDROCHLORIDE 15 MG/1
15 TABLET ORAL DAILY
Qty: 90 TABLET | Refills: 0 | Status: SHIPPED | OUTPATIENT
Start: 2023-11-01 | End: 2024-02-15 | Stop reason: SDUPTHER

## 2023-11-29 ENCOUNTER — TELEPHONE (OUTPATIENT)
Dept: NEPHROLOGY | Facility: CLINIC | Age: 78
End: 2023-11-29
Payer: MEDICARE

## 2023-11-29 DIAGNOSIS — N18.4 CKD (CHRONIC KIDNEY DISEASE) STAGE 4, GFR 15-29 ML/MIN: Primary | ICD-10-CM

## 2023-11-29 NOTE — TELEPHONE ENCOUNTER
Patient was supposed to have a follow up in August and patient would like a follow up VV scheduled. I am sending this message to Stephanie to schedule a follow up and I will call patient to schedule labs.Patient will be going to [facility]. Please change to [facility] it wont let me change lab for standing orders.

## 2023-11-29 NOTE — TELEPHONE ENCOUNTER
----- Message from Francie De Anda sent at 11/29/2023 12:37 PM CST -----  Type: Needs Medical Advice  Who Called:  pts wife  Best Call Back Number: [phone]  Additional Information: Wife is requesting to schedule the pts 6 mo f/u as a virtual but she wants to know if he needs to do lab work first, pl call bk and advise thanks

## 2023-12-06 ENCOUNTER — TELEPHONE (OUTPATIENT)
Dept: FAMILY MEDICINE | Facility: CLINIC | Age: 78
End: 2023-12-06

## 2023-12-06 NOTE — TELEPHONE ENCOUNTER
----- Message from Camryn Martino sent at 12/6/2023  9:11 AM CST -----  Contact: Andreia  Does pt need to get his labs done before his NP appt. Andreia @[phone]

## 2023-12-06 NOTE — TELEPHONE ENCOUNTER
Spoke with patients wife and let her know we cannot order labs before visit but will order at that time.

## 2023-12-07 ENCOUNTER — TELEPHONE (OUTPATIENT)
Dept: NEPHROLOGY | Facility: CLINIC | Age: 78
End: 2023-12-07
Payer: MEDICARE

## 2023-12-08 LAB
ALBUMIN SERPL-MCNC: 4.2 G/DL (ref 3.6–5.1)
BUN SERPL-MCNC: 38 MG/DL (ref 7–25)
BUN/CREAT SERPL: 17 (CALC) (ref 6–22)
CALCIUM SERPL-MCNC: 9.2 MG/DL (ref 8.6–10.3)
CHLORIDE SERPL-SCNC: 106 MMOL/L (ref 98–110)
CO2 SERPL-SCNC: 25 MMOL/L (ref 20–32)
CREAT SERPL-MCNC: 2.27 MG/DL (ref 0.7–1.22)
CREAT UR-MCNC: 150 MG/DL (ref 20–320)
EGFR: 28 ML/MIN/1.73M2
GLUCOSE SERPL-MCNC: 117 MG/DL (ref 65–99)
PHOSPHATE SERPL-MCNC: 3.5 MG/DL (ref 2.1–4.3)
POTASSIUM SERPL-SCNC: 4.2 MMOL/L (ref 3.5–5.3)
PROT UR-MCNC: 51 MG/DL (ref 5–25)
PROT/CREAT UR: 0.34 MG/MG CREAT (ref 0.03–0.15)
PROT/CREAT UR: 340 MG/G CREAT (ref 25–148)
PTH-INTACT SERPL-MCNC: 24 PG/ML (ref 16–77)
SODIUM SERPL-SCNC: 139 MMOL/L (ref 135–146)
URATE SERPL-MCNC: 6.7 MG/DL (ref 4–8)

## 2023-12-12 ENCOUNTER — OFFICE VISIT (OUTPATIENT)
Dept: NEPHROLOGY | Facility: CLINIC | Age: 78
End: 2023-12-12
Payer: MEDICARE

## 2023-12-12 DIAGNOSIS — N18.4 TYPE 2 DIABETES MELLITUS WITH STAGE 4 CHRONIC KIDNEY DISEASE, WITHOUT LONG-TERM CURRENT USE OF INSULIN: ICD-10-CM

## 2023-12-12 DIAGNOSIS — N28.1 ACQUIRED CYST OF KIDNEY: ICD-10-CM

## 2023-12-12 DIAGNOSIS — E11.22 TYPE 2 DIABETES MELLITUS WITH STAGE 4 CHRONIC KIDNEY DISEASE, WITHOUT LONG-TERM CURRENT USE OF INSULIN: ICD-10-CM

## 2023-12-12 DIAGNOSIS — I10 PRIMARY HYPERTENSION: ICD-10-CM

## 2023-12-12 DIAGNOSIS — N18.4 CKD (CHRONIC KIDNEY DISEASE) STAGE 4, GFR 15-29 ML/MIN: Primary | ICD-10-CM

## 2023-12-12 DIAGNOSIS — N20.0 KIDNEY STONE: ICD-10-CM

## 2023-12-12 PROCEDURE — 99499 UNLISTED E&M SERVICE: CPT | Mod: 95,,, | Performed by: INTERNAL MEDICINE

## 2023-12-12 PROCEDURE — 99499 NO LOS: ICD-10-PCS | Mod: 95,,, | Performed by: INTERNAL MEDICINE

## 2023-12-12 NOTE — PROGRESS NOTES
Subjective:       Patient ID: Ruy Ramos Jr. is a 78 y.o. White male who presents for return patient evaluation for chronic renal failure.    The patient location is:  Patient Home   The chief complaint leading to consultation is: ckd  Visit type: Virtual visit with synchronous audio and video  Total time spent with patient: 15 minutes  Each patient to whom he or she provides medical services by telemedicine is:  (1) informed of the relationship between the physician and patient and the respective role of any other health care provider with respect to management of the patient; and (2) notified that he or she may decline to receive medical services by telemedicine and may withdraw from such care at any time.       He had a kidney a kidney stone on April 19th, 2 mm and 4 mm.   He had COVID in August on 2020.  His blood glucose has been well controlled.   He is no longer dizzy.        Review of Systems   Constitutional:  Positive for fatigue. Negative for appetite change, chills and fever.   HENT:  Negative for congestion.    Eyes:  Positive for visual disturbance.   Respiratory:  Positive for cough (chronic since COVID) and shortness of breath (with exertion).    Cardiovascular:  Negative for chest pain and leg swelling.   Gastrointestinal:  Negative for abdominal pain, diarrhea, nausea and vomiting.   Genitourinary:  Negative for difficulty urinating, dysuria and hematuria.   Musculoskeletal:  Positive for arthralgias (B shoulders and fingers). Negative for myalgias.   Skin:  Negative for rash.   Neurological:  Positive for numbness (feet). Negative for dizziness and headaches.   Psychiatric/Behavioral:  Negative for sleep disturbance.        The past medical, family and social histories were reviewed for this encounter.     There were no vitals taken for this visit.    Objective:      Physical Exam  Vitals reviewed.   Constitutional:       General: He is not in acute distress.     Appearance: He is  well-developed.   HENT:      Head: Normocephalic and atraumatic.   Eyes:      General: No scleral icterus.  Pulmonary:      Effort: Pulmonary effort is normal. No respiratory distress.   Neurological:      Mental Status: He is alert and oriented to person, place, and time.   Psychiatric:         Mood and Affect: Mood normal.         Behavior: Behavior normal.         Assessment:       1. CKD (chronic kidney disease) stage 4, GFR 15-29 ml/min    2. Primary hypertension    3. Type 2 diabetes mellitus with stage 4 chronic kidney disease, without long-term current use of insulin    4. Kidney stone    5. Acquired cyst of kidney        Lab Results   Component Value Date    CREATININE 2.27 (H) 12/07/2023    BUN 38 (H) 12/07/2023     12/07/2023    K 4.2 12/07/2023     12/07/2023    CO2 25 12/07/2023     Lab Results   Component Value Date    PTH 24 12/07/2023    CALCIUM 9.2 12/07/2023    PHOS 3.0 04/27/2023     Lab Results   Component Value Date    HCT 42.6 06/14/2023     Prot/Creat Ratio, Urine   Date Value Ref Range Status   04/27/2023 0.29 (H) 0.00 - 0.20 Final   02/09/2023 0.25 (H) 0.00 - 0.20 Final   02/01/2023 0.34 (H) 0.00 - 0.20 Final     Plan:   Return to clinic in 3 months.  Labs for next visit include rp, pth, upc per standing orders.    Baseline creatinine is 1.5-1.7 prior to 2020.  He has a new baseline since his COVID infection in August of 2020 of 2.0-2.3.  PTH is 24 with a calcium of 9.2.  Renal US shows R 9.2 cm L 9.4 cm.  He sees Urology in [place].  UPC is 0.29 on an ARB.  Your blood pressure is controlled on your current medications.  Keep in mind that weight loss often improves blood pressure.  If you do diet and lose weight we will likely need to adjust your medications.  The effects of diabetes as it relates to kidney function was discussed.  As for the control of your blood sugar, please follow up with your PCP or Endocrinology.  We discussed kidney cysts and that they are common (occur in  50% of patients over 50 years old).  We do like to monitor them with repeat ultrasound ever 1-2 years to document stability.  He did have a ruptured appendix in May of 2020 which could have been the event that affected his kidney function as well as COVID infection that same year in August.  He has uric acid stones.  Uric acid is 6.7.

## 2024-01-23 ENCOUNTER — OFFICE VISIT (OUTPATIENT)
Dept: FAMILY MEDICINE | Facility: CLINIC | Age: 79
End: 2024-01-23
Attending: FAMILY MEDICINE
Payer: MEDICARE

## 2024-01-23 VITALS — WEIGHT: 177 LBS | BODY MASS INDEX: 28.45 KG/M2 | HEIGHT: 66 IN | HEART RATE: 50 BPM

## 2024-01-23 DIAGNOSIS — I10 PRIMARY HYPERTENSION: ICD-10-CM

## 2024-01-23 DIAGNOSIS — E11.8 CONTROLLED TYPE 2 DIABETES MELLITUS WITH COMPLICATION, WITHOUT LONG-TERM CURRENT USE OF INSULIN: ICD-10-CM

## 2024-01-23 DIAGNOSIS — E11.22 TYPE 2 DIABETES MELLITUS WITH STAGE 3B CHRONIC KIDNEY DISEASE, WITHOUT LONG-TERM CURRENT USE OF INSULIN: Primary | ICD-10-CM

## 2024-01-23 DIAGNOSIS — I48.0 PAROXYSMAL ATRIAL FIBRILLATION: ICD-10-CM

## 2024-01-23 DIAGNOSIS — E11.49 OTHER DIABETIC NEUROLOGICAL COMPLICATION ASSOCIATED WITH TYPE 2 DIABETES MELLITUS: ICD-10-CM

## 2024-01-23 DIAGNOSIS — N40.1 NOCTURIA ASSOCIATED WITH BENIGN PROSTATIC HYPERPLASIA: ICD-10-CM

## 2024-01-23 DIAGNOSIS — N18.32 TYPE 2 DIABETES MELLITUS WITH STAGE 3B CHRONIC KIDNEY DISEASE, WITHOUT LONG-TERM CURRENT USE OF INSULIN: Primary | ICD-10-CM

## 2024-01-23 DIAGNOSIS — R35.1 NOCTURIA ASSOCIATED WITH BENIGN PROSTATIC HYPERPLASIA: ICD-10-CM

## 2024-01-23 DIAGNOSIS — N18.32 STAGE 3B CHRONIC KIDNEY DISEASE: ICD-10-CM

## 2024-01-23 DIAGNOSIS — E08.21 DIABETIC NEPHROPATHY ASSOCIATED WITH DIABETES MELLITUS DUE TO UNDERLYING CONDITION: ICD-10-CM

## 2024-01-23 DIAGNOSIS — E78.5 HYPERLIPIDEMIA, UNSPECIFIED HYPERLIPIDEMIA TYPE: ICD-10-CM

## 2024-01-23 LAB — HBA1C MFR BLD: 7 %

## 2024-01-23 PROCEDURE — 1101F PT FALLS ASSESS-DOCD LE1/YR: CPT | Mod: CPTII,S$GLB,, | Performed by: FAMILY MEDICINE

## 2024-01-23 PROCEDURE — 3288F FALL RISK ASSESSMENT DOCD: CPT | Mod: CPTII,S$GLB,, | Performed by: FAMILY MEDICINE

## 2024-01-23 PROCEDURE — 1159F MED LIST DOCD IN RCRD: CPT | Mod: CPTII,S$GLB,, | Performed by: FAMILY MEDICINE

## 2024-01-23 PROCEDURE — 99214 OFFICE O/P EST MOD 30 MIN: CPT | Mod: S$GLB,,, | Performed by: FAMILY MEDICINE

## 2024-01-23 PROCEDURE — 83036 HEMOGLOBIN GLYCOSYLATED A1C: CPT | Mod: QW,,, | Performed by: FAMILY MEDICINE

## 2024-01-23 NOTE — PROGRESS NOTES
Subjective     Patient ID: Ruy Ramos Jr. is a 78 y.o. male.    Chief Complaint: Establish Care (Brought bottles, A1c and Foot exam ordered, pt is fasting for blood work, abc )    No them Ruy is an 78 year-old male with relevant PMH of atrial fibrillation, CKD3b and DMT2 who presented to clinic today to establish primary care. He denied acute or new complaints.     Cardiologist Dr. Gunner Crisostomo, echocardiogram done recently.  Angiogram 10 years ago showed a 20% blockage in some arteries.    Orthopedics-Dr. Encarnacion-right shoulder osteoarthritis, bilateral rotator cuff tears.    Feet-feel cold and numb at times.    Diabetes  He presents for his initial diabetic visit. He has type 2 diabetes mellitus. No MedicAlert identification noted. The initial diagnosis of diabetes was made 3 years ago. His disease course has been worsening. There are no hypoglycemic associated symptoms. Associated symptoms include foot paresthesias, visual change and weight loss. Pertinent negatives for diabetes include no blurred vision, no fatigue and no foot ulcerations. There are no hypoglycemic complications. Symptoms are worsening. Diabetic complications include a CVA, heart disease, nephropathy, peripheral neuropathy and PVD. Pertinent negatives for diabetic complications include no autonomic neuropathy, impotence or retinopathy. Risk factors for coronary artery disease include diabetes mellitus, hypertension, dyslipidemia and male sex. Current diabetic treatment includes oral agent (dual therapy). He is compliant with treatment all of the time. His weight is decreasing steadily. He is following a generally healthy diet. When asked about meal planning, he reported none. He participates in exercise three times a week. His home blood glucose trend is fluctuating minimally. An ACE inhibitor/angiotensin II receptor blocker is being taken. He does not see a podiatrist.Eye exam is current.     Review of Systems   Constitutional:   Positive for unexpected weight change and weight loss. Negative for activity change, appetite change and fatigue.   HENT: Negative.     Eyes:  Positive for visual disturbance. Negative for blurred vision, pain and discharge.        Left eye decreased visual acuity over previous year.   Respiratory: Negative.     Cardiovascular: Negative.    Gastrointestinal: Negative.    Endocrine: Negative.    Genitourinary:  Negative for bladder incontinence, difficulty urinating, hematuria, impotence and testicular pain.        Nocturia x3 QHS.   Musculoskeletal: Negative.    Integumentary:  Negative.   Allergic/Immunologic: Negative.    Neurological: Negative.    Hematological: Negative.    Psychiatric/Behavioral: Negative.     All other systems reviewed and are negative.      Current Outpatient Medications:     aspirin (ECOTRIN) 81 MG EC tablet, Take 81 mg by mouth once daily., Disp: , Rfl:     canagliflozin (INVOKANA) 300 mg Tab tablet, Take 1 tablet (300 mg total) by mouth once daily., Disp: 90 tablet, Rfl: 3    ezetimibe (ZETIA) 10 mg tablet, Take 1 tablet (10 mg total) by mouth once daily., Disp: 90 tablet, Rfl: 3    folic acid-vit B6-vit B12 (FOLPLEX 2.2) 2.2-25-0.5 mg Tab, Take 1 tablet by mouth once daily., Disp: 90 each, Rfl: 3    L. acidophilus/L. rhamnosus (DIGESTIVE HEALTH PROBIOTIC ORAL), Take 1 tablet by mouth once daily., Disp: , Rfl:     metoprolol succinate (TOPROL-XL) 25 MG 24 hr tablet, Take 1 tablet (25 mg total) by mouth once daily., Disp: 90 tablet, Rfl: 3    MULTIVITAMIN ORAL, Take 1 tablet by mouth once daily. , Disp: , Rfl:     omega 3-dha-epa-fish oil 900-1,400 mg CpDR, Take 2 capsules by mouth 2 (two) times daily. , Disp: , Rfl:     pioglitazone (ACTOS) 15 MG tablet, Take 1 tablet (15 mg total) by mouth once daily., Disp: 90 tablet, Rfl: 0    pravastatin (PRAVACHOL) 10 MG tablet, Take 1 tablet (10 mg total) by mouth once daily., Disp: 90 tablet, Rfl: 3    telmisartan (MICARDIS) 40 MG Tab, Take 40 mg by  "mouth once daily., Disp: , Rfl:     vitamin D (VITAMIN D3) 1000 units Tab, Take 1,000 Units by mouth once daily., Disp: , Rfl:     ACCU-CHEK AUDREY PLUS METER [facility], , Disp: , Rfl:     ACCU-CHEK SOFTCLIX LANCETS Misc, , Disp: , Rfl:     blood sugar diagnostic (ACCU-CHEK AUDREY PLUS TEST STRP) Strp, Use daily for blood  sugar, Disp: 100 strip, Rfl: 1         Objective     Vitals:    01/23/24 0915   BP: (P) 112/60   Pulse: (!) 50   RR: 12   BMI: 28.57   Weight: 80.3 kg (177 lb)   Height: 5' 6" (1.676 m)        Physical Exam  Vitals and nursing note reviewed.   Constitutional:       Appearance: Normal appearance. He is normal weight.   HENT:      Head: Normocephalic and atraumatic.      Right Ear: Tympanic membrane normal.      Left Ear: Tympanic membrane normal.      Nose: Nose normal.      Mouth/Throat:      Mouth: Mucous membranes are moist.      Pharynx: Oropharynx is clear.   Eyes:      Extraocular Movements: Extraocular movements intact.      Conjunctiva/sclera: Conjunctivae normal.      Pupils: Pupils are equal, round, and reactive to light.   Cardiovascular:      Rate and Rhythm: Normal rate and regular rhythm.      Pulses:           Dorsalis pedis pulses are 1+ on the right side and 1+ on the left side.        Posterior tibial pulses are 1+ on the right side and 1+ on the left side.      Heart sounds: Normal heart sounds. No murmur heard.     No friction rub. No gallop.   Pulmonary:      Effort: Pulmonary effort is normal.      Breath sounds: Normal breath sounds.   Abdominal:      General: Abdomen is flat. Bowel sounds are normal.      Palpations: Abdomen is soft.   Musculoskeletal:         General: Normal range of motion.      Cervical back: Normal range of motion.      Right lower leg: No edema.      Left lower leg: No edema.        Feet:    Feet:      Right foot:      Protective Sensation: 8 sites tested.  8 sites sensed.      Skin integrity: Skin integrity normal. No ulcer or skin breakdown.      Toenail " Condition: Right toenails are normal.      Left foot:      Protective Sensation: 8 sites tested.  7 sites sensed.      Skin integrity: Skin integrity normal. No ulcer or skin breakdown.      Toenail Condition: Left toenails are normal.      Comments: Discriminative sensation diminished on Left big toe.  Skin:     General: Skin is warm and dry.      Capillary Refill: Capillary refill takes less than 2 seconds.      Findings: No lesion.   Neurological:      Mental Status: He is alert and oriented to person, place, and time. Mental status is at baseline.      Cranial Nerves: No cranial nerve deficit.      Sensory: Sensory deficit present.      Motor: No weakness.      Coordination: Coordination normal.      Gait: Gait normal.      Deep Tendon Reflexes: Reflexes normal.   Psychiatric:         Mood and Affect: Mood normal.         Behavior: Behavior normal.         Thought Content: Thought content normal.         Judgment: Judgment normal.       Recent Results (from the past 1008 hour(s))   Hemoglobin A1C, POCT    Collection Time: 01/23/24  9:31 AM   Result Value Ref Range    Hemoglobin A1C, POC 7.0 %         Assessment and Plan     Ruy is an 78 year-old male who has complicated medical history. He follows with nephrology and cardiology for his related conditions. His increase in HbA1c is concerning considering his comorbidity and may warrant adjustment to his regimen at follow-up. Recommend routine laboratory work for next visit, especially a lipid panel and HbA1c.    1. Type 2 diabetes mellitus with stage 3b chronic kidney disease, without long-term current use of insulin  -     Continue pioglitazone and canagliflozin.    2. Paroxysmal atrial fibrillation  -     Continue metoprolol and aspirin.  - Continue care with cardiologist.    3. Controlled type 2 diabetes mellitus with complication, without long-term current use of insulin  -     Hemoglobin A1C, POCT  -     Foot Exam Performed  -     blood sugar diagnostic  (ACCU-CHEK AUDREY PLUS TEST STRP) Strp; Use daily for blood  sugar  Dispense: 100 strip; Refill: 1  -     Comprehensive Metabolic Panel; Future; Expected date: 01/23/2024  -     Lipid Panel; Future; Expected date: 01/23/2024  Recheck lipids six-months  4. Diabetic nephropathy associated with diabetes mellitus due to underlying condition    5. Other diabetic neurological complication associated with type 2 diabetes mellitus    6. Stage 3b chronic kidney disease  -     Continue care with nephrologist.  Dr. Scott Cano-creatinine 2.2 GFR 28 increase your water intake.    7. Hyperlipidemia, unspecified hyperlipidemia type  -     Continue ezetimibe and pravastatin.  Cholesterol 179  HDL 35 LDL 81  8. Primary hypertension  -     Continue telmisartan.    9. Nocturia associated with benign prostatic hyperplasia  -     Monitor for change at next visit.       Follow up in about 6 months (around 8/1/2024) for Diabetic Check-Up.

## 2024-02-02 ENCOUNTER — TELEPHONE (OUTPATIENT)
Dept: FAMILY MEDICINE | Facility: CLINIC | Age: 79
End: 2024-02-02
Payer: MEDICARE

## 2024-02-02 DIAGNOSIS — E87.5 HYPERKALEMIA: Primary | ICD-10-CM

## 2024-02-02 LAB
ALBUMIN SERPL-MCNC: 4.3 G/DL (ref 3.6–5.1)
ALBUMIN/GLOB SERPL: 1.8 (CALC) (ref 1–2.5)
ALP SERPL-CCNC: 86 U/L (ref 35–144)
ALT SERPL-CCNC: 20 U/L (ref 9–46)
AST SERPL-CCNC: 16 U/L (ref 10–35)
BILIRUB SERPL-MCNC: 0.5 MG/DL (ref 0.2–1.2)
BUN SERPL-MCNC: 32 MG/DL (ref 7–25)
BUN/CREAT SERPL: 16 (CALC) (ref 6–22)
CALCIUM SERPL-MCNC: 9.5 MG/DL (ref 8.6–10.3)
CHLORIDE SERPL-SCNC: 105 MMOL/L (ref 98–110)
CHOLEST SERPL-MCNC: 224 MG/DL
CHOLEST/HDLC SERPL: 4.8 (CALC)
CO2 SERPL-SCNC: 27 MMOL/L (ref 20–32)
CREAT SERPL-MCNC: 1.97 MG/DL (ref 0.7–1.22)
EGFR: 34 ML/MIN/1.73M2
GLOBULIN SER CALC-MCNC: 2.4 G/DL (CALC) (ref 1.9–3.7)
GLUCOSE SERPL-MCNC: 134 MG/DL (ref 65–99)
HDLC SERPL-MCNC: 47 MG/DL
LDLC SERPL CALC-MCNC: 129 MG/DL (CALC)
NONHDLC SERPL-MCNC: 177 MG/DL (CALC)
POTASSIUM SERPL-SCNC: 5.7 MMOL/L (ref 3.5–5.3)
PROT SERPL-MCNC: 6.7 G/DL (ref 6.1–8.1)
SODIUM SERPL-SCNC: 142 MMOL/L (ref 135–146)
TRIGL SERPL-MCNC: 335 MG/DL

## 2024-02-02 NOTE — TELEPHONE ENCOUNTER
Contacting patient regarding recent labs to get more information. Patient is out walking now and asked to call back in a little bit.

## 2024-02-02 NOTE — TELEPHONE ENCOUNTER
Spoke with patient states he had a few oranges last week. Patient states he does take a multi vitamin and not sure what is in it. Wife would know but is playing cards.

## 2024-02-02 NOTE — TELEPHONE ENCOUNTER
He should try and avoid/reduce citrus, avocado, potassium rich foods such as bananas.  Recheck BMP in 1 week

## 2024-02-03 NOTE — PROGRESS NOTES
Call patient.  Kidney function is mildly improved with a GFR improving to 34.  Liver enzymes and sugar looks stable.  Cholesterol mild elevation at 224 triglycerides are high at 335.  A1c is 7.0 showing adequate diabetic control.  Would recommend increasing his pravastatin from 10 mg up to 20 mg for better cholesterol and triglycerides control.  Repeat BMP in 1 week to recheck high potassium level.  Would recheck A1c CMP lipids in 6 months

## 2024-02-05 ENCOUNTER — TELEPHONE (OUTPATIENT)
Dept: FAMILY MEDICINE | Facility: CLINIC | Age: 79
End: 2024-02-05
Payer: MEDICARE

## 2024-02-05 DIAGNOSIS — E78.5 HYPERLIPIDEMIA, UNSPECIFIED HYPERLIPIDEMIA TYPE: Primary | ICD-10-CM

## 2024-02-05 RX ORDER — PRAVASTATIN SODIUM 20 MG/1
20 TABLET ORAL DAILY
Qty: 90 TABLET | Refills: 1 | Status: SHIPPED | OUTPATIENT
Start: 2024-02-05 | End: 2024-08-03

## 2024-02-05 NOTE — TELEPHONE ENCOUNTER
Spoke to patient and wife with results verbatim per Dr Bunch. Verbalized understanding on all. Would like Pravastatin to go to [facility]. Will take 2 10mg until those run out. Remind me for 6 month lab. Elida talked to patient about potassium last week. Clarifying that reminder was created for that. Order pended

## 2024-02-05 NOTE — TELEPHONE ENCOUNTER
----- Message from Rigoberto Bunch MD sent at 2/3/2024  2:16 PM CST -----  Call patient.  Kidney function is mildly improved with a GFR improving to 34.  Liver enzymes and sugar looks stable.  Cholesterol mild elevation at 224 triglycerides are high at 335.  A1c is 7.0 showing adequate diabetic control.  Would recommend increasing his pravastatin from 10 mg up to 20 mg for better cholesterol and triglycerides control.  Repeat BMP in 1 week to recheck high potassium level.  Would recheck A1c CMP lipids in 6 months

## 2024-02-05 NOTE — TELEPHONE ENCOUNTER
Was remind me created for lab? I had to call him on rest of labs and saw this note. I advised patient we would call this week to remind him and send lab order to [facility]. Want to be sure remind me was created. Thank you.

## 2024-02-07 NOTE — TELEPHONE ENCOUNTER
Spoke with patient and wife and states strips do not fit the machine. [facility] sent patient states they received true Metrix test strips.   Not the test strips Dr. Bunch ordered for him. Asking if prescription can be sent in again for patient  to [facility].   Patient received wrong prescription from [facility].   Accu-check Griselda Plus pended for provider review.     Verbalized understanding of Dr. Bunch's message.

## 2024-02-07 NOTE — TELEPHONE ENCOUNTER
----- Message from Betty Bruce sent at 2/7/2024  9:45 AM CST -----  The wrong test strips were called in. He needs the Accuchek  Griselda Plus test strips. He test 1 time a day. [facility].  The patient is on a higher dose of his Cholesterol medication. Does Dr. Bunch still  want him to take the 2 in the am and 2 in the pm. Fish Oil  pt's # [phone] GH

## 2024-02-08 NOTE — TELEPHONE ENCOUNTER
Lynn Norman, TALA Bunch Staff  2/2/24  Per Cesar - recheck BMP in 1 week    Spoke with patient and informed that it's time to recheck his BMP and patient verbalized understanding and would like to complete lab at [facility].   Lab pended for provider review and patient will go tomorrow.

## 2024-02-10 LAB
BUN SERPL-MCNC: 27 MG/DL (ref 7–25)
BUN/CREAT SERPL: 15 (CALC) (ref 6–22)
CALCIUM SERPL-MCNC: 9.2 MG/DL (ref 8.6–10.3)
CHLORIDE SERPL-SCNC: 106 MMOL/L (ref 98–110)
CO2 SERPL-SCNC: 24 MMOL/L (ref 20–32)
CREAT SERPL-MCNC: 1.78 MG/DL (ref 0.7–1.22)
EGFR: 38 ML/MIN/1.73M2
GLUCOSE SERPL-MCNC: 147 MG/DL (ref 65–99)
POTASSIUM SERPL-SCNC: 4.6 MMOL/L (ref 3.5–5.3)
SODIUM SERPL-SCNC: 141 MMOL/L (ref 135–146)

## 2024-02-15 DIAGNOSIS — E11.42 DIABETIC POLYNEUROPATHY ASSOCIATED WITH TYPE 2 DIABETES MELLITUS: ICD-10-CM

## 2024-02-15 RX ORDER — PIOGLITAZONEHYDROCHLORIDE 15 MG/1
15 TABLET ORAL DAILY
Qty: 90 TABLET | Refills: 1 | Status: SHIPPED | OUTPATIENT
Start: 2024-02-15

## 2024-02-15 NOTE — TELEPHONE ENCOUNTER
Last office visit on 1/23/24 and upcoming on 7/30/24. Upon review of chart this medication is prescribed by Lynn Melendez MD. Patient and wife states this is from the previous doctor and asking if Dr. Bunch will refill this. Pended for provider review.

## 2024-02-15 NOTE — TELEPHONE ENCOUNTER
----- Message from Betty Bruce sent at 2/15/2024  2:36 PM CST -----  Refill [facility] pt's # [phone] GH

## 2024-03-04 LAB
LEFT EYE DM RETINOPATHY: NEGATIVE
RIGHT EYE DM RETINOPATHY: NEGATIVE

## 2024-03-06 ENCOUNTER — PATIENT OUTREACH (OUTPATIENT)
Dept: ADMINISTRATIVE | Facility: HOSPITAL | Age: 79
End: 2024-03-06
Payer: MEDICARE

## 2024-03-06 NOTE — PROGRESS NOTES
Population Health Chart Review & Patient Outreach Details      Additional [facility] Health Notes:               Updates Requested / Reviewed:      Updated Care Coordination Note, Care Everywhere, and Immunizations Reconciliation Completed or Queried: [place]         Health Maintenance Topics Overdue:      [facility] Score: 0     Patient is not due for any topics at this time.                Health Maintenance Topic(s) Outreach Outcomes & Actions Taken:    Eye Exam - Outreach Outcomes & Actions Taken  : Diabetic Eye External Records Uploaded, Care Team & History Updated if Applicable

## 2024-03-12 DIAGNOSIS — E08.21 DIABETIC NEPHROPATHY ASSOCIATED WITH DIABETES MELLITUS DUE TO UNDERLYING CONDITION: ICD-10-CM

## 2024-03-12 DIAGNOSIS — E11.42 DIABETIC POLYNEUROPATHY ASSOCIATED WITH TYPE 2 DIABETES MELLITUS: ICD-10-CM

## 2024-03-12 RX ORDER — CYANOCOBALAMIN/FOLIC AC/VIT B6 0.5-2.2-25
1 TABLET ORAL DAILY
Qty: 90 EACH | Refills: 3 | Status: SHIPPED | OUTPATIENT
Start: 2024-03-12

## 2024-03-12 NOTE — TELEPHONE ENCOUNTER
----- Message from Susan Downing sent at 3/12/2024  3:32 PM CDT -----  Pt needs refill on floplex 2.2-0.5/25 mg and invokana 300 mg   [facility]   [phone]

## 2024-03-15 NOTE — HIM RECORD RETIREMENT NOTE
halfway of Incomplete Medical Record    3/15/24    Patient Name: Ruy Ramos  Contact Serial # (CSN): [id number]  Patient Medical Record # (MRN): [id number]  Date of Service: Orders Only on 8/17/2022  Physician Name: Kanu Medeiros,    This record has been reviewed and is being retired as incomplete by the approval of the  Medical Staff Operating Committee (MSOC)     On 12/7/2022., due to:  Unavailability of Provider     Missing Information/Comments:  []    Discharge Summary   []    DC Note/Short Stay Summary   []    ED Provider Note   []    Delivery Note   []    History & Physical   []   Operative Note   []     Procedure Note   []     Physician Order   []     Verbal Order   [x]       Other, specify: Cosign Orders

## 2024-06-13 ENCOUNTER — TELEPHONE (OUTPATIENT)
Dept: FAMILY MEDICINE | Facility: CLINIC | Age: 79
End: 2024-06-13
Payer: MEDICARE

## 2024-06-13 DIAGNOSIS — E78.5 HYPERLIPIDEMIA, UNSPECIFIED HYPERLIPIDEMIA TYPE: ICD-10-CM

## 2024-06-13 DIAGNOSIS — E11.8 CONTROLLED TYPE 2 DIABETES MELLITUS WITH COMPLICATION, WITHOUT LONG-TERM CURRENT USE OF INSULIN: ICD-10-CM

## 2024-06-13 DIAGNOSIS — I48.0 PAROXYSMAL ATRIAL FIBRILLATION: ICD-10-CM

## 2024-06-13 DIAGNOSIS — Z79.899 ENCOUNTER FOR LONG-TERM (CURRENT) USE OF OTHER MEDICATIONS: Primary | ICD-10-CM

## 2024-06-13 DIAGNOSIS — E87.5 HYPERKALEMIA: ICD-10-CM

## 2024-06-13 NOTE — TELEPHONE ENCOUNTER
----- Message from Kath Hutchinson sent at 6/13/2024  2:03 PM CDT -----  Pt would like to know when is his blood work be able to be sent to the lab. Would like to get blood work done between the 2nd and 5th of July.   [phone]

## 2024-06-20 ENCOUNTER — HOSPITAL ENCOUNTER (OUTPATIENT)
Dept: RADIOLOGY | Facility: HOSPITAL | Age: 79
Discharge: HOME | End: 2024-06-20
Attending: SPECIALIST
Payer: MEDICARE

## 2024-06-20 DIAGNOSIS — N20.0 URIC ACID NEPHROLITHIASIS: ICD-10-CM

## 2024-06-20 DIAGNOSIS — N20.0 URIC ACID NEPHROLITHIASIS: Primary | ICD-10-CM

## 2024-06-20 PROCEDURE — 74018 RADEX ABDOMEN 1 VIEW: CPT | Mod: 26,,, | Performed by: RADIOLOGY

## 2024-06-20 PROCEDURE — 74018 RADEX ABDOMEN 1 VIEW: CPT | Mod: TC,PO

## 2024-06-23 NOTE — PROGRESS NOTES
Call patient.  Fasting sugar elevated at 156 and A1c is 7.2.  Knees are mildly elevated.  Kidney function is slightly worsened with BUN 51 creatinine 2.3 GFR has dropped back down to 28.  Really needs to increase his water intake this summer.  Cholesterol 208 and triglycerides have jumped up to 416.  Reduce fried food and fast food in the diet

## 2024-06-24 ENCOUNTER — TELEPHONE (OUTPATIENT)
Dept: FAMILY MEDICINE | Facility: CLINIC | Age: 79
End: 2024-06-24
Payer: MEDICARE

## 2024-06-24 NOTE — TELEPHONE ENCOUNTER
----- Message from Rigoberto Bunch MD sent at 6/23/2024  5:33 PM CDT -----  Call patient.  Fasting sugar elevated at 156 and A1c is 7.2.  Knees are mildly elevated.  Kidney function is slightly worsened with BUN 51 creatinine 2.3 GFR has dropped back down to 28.  Really needs to increase his water intake this summer.  Cholesterol 208 and triglycerides have jumped up to 416.  Reduce fried food and fast food in the diet

## 2024-06-24 NOTE — TELEPHONE ENCOUNTER
"Need clarification on "knees" are mildly elevated. Gave patient rest of results. Verbalized understanding.  "

## 2024-06-25 NOTE — TELEPHONE ENCOUNTER
"Marichuy clarified with Dr. Bunch - he states to omit that line, was dictating and that should not be part of the result note. "Nothing new was elevated"   "

## 2024-07-30 ENCOUNTER — OFFICE VISIT (OUTPATIENT)
Dept: FAMILY MEDICINE | Facility: CLINIC | Age: 79
End: 2024-07-30
Payer: MEDICARE

## 2024-07-30 VITALS
BODY MASS INDEX: 28.25 KG/M2 | WEIGHT: 180 LBS | HEIGHT: 67 IN | SYSTOLIC BLOOD PRESSURE: 140 MMHG | OXYGEN SATURATION: 98 % | HEART RATE: 54 BPM | DIASTOLIC BLOOD PRESSURE: 70 MMHG

## 2024-07-30 DIAGNOSIS — I10 PRIMARY HYPERTENSION: ICD-10-CM

## 2024-07-30 DIAGNOSIS — E11.8 CONTROLLED TYPE 2 DIABETES MELLITUS WITH COMPLICATION, WITHOUT LONG-TERM CURRENT USE OF INSULIN: Primary | ICD-10-CM

## 2024-07-30 DIAGNOSIS — I48.0 PAROXYSMAL ATRIAL FIBRILLATION: ICD-10-CM

## 2024-07-30 DIAGNOSIS — E11.22 TYPE 2 DIABETES MELLITUS WITH STAGE 3B CHRONIC KIDNEY DISEASE, WITHOUT LONG-TERM CURRENT USE OF INSULIN: ICD-10-CM

## 2024-07-30 DIAGNOSIS — N28.1 ACQUIRED CYST OF KIDNEY: ICD-10-CM

## 2024-07-30 DIAGNOSIS — E78.5 HYPERLIPIDEMIA, UNSPECIFIED HYPERLIPIDEMIA TYPE: ICD-10-CM

## 2024-07-30 DIAGNOSIS — E08.21 DIABETIC NEPHROPATHY ASSOCIATED WITH DIABETES MELLITUS DUE TO UNDERLYING CONDITION: ICD-10-CM

## 2024-07-30 DIAGNOSIS — E78.2 MIXED HYPERLIPIDEMIA: ICD-10-CM

## 2024-07-30 DIAGNOSIS — N20.0 NEPHROLITHIASIS: ICD-10-CM

## 2024-07-30 DIAGNOSIS — N18.4 STAGE 4 CHRONIC KIDNEY DISEASE: ICD-10-CM

## 2024-07-30 DIAGNOSIS — N18.32 TYPE 2 DIABETES MELLITUS WITH STAGE 3B CHRONIC KIDNEY DISEASE, WITHOUT LONG-TERM CURRENT USE OF INSULIN: ICD-10-CM

## 2024-07-30 DIAGNOSIS — E11.42 DIABETIC POLYNEUROPATHY ASSOCIATED WITH TYPE 2 DIABETES MELLITUS: ICD-10-CM

## 2024-07-30 DIAGNOSIS — Z98.890 HISTORY OF CAROTID ENDARTERECTOMY: ICD-10-CM

## 2024-07-30 DIAGNOSIS — N20.0 KIDNEY STONE: ICD-10-CM

## 2024-07-30 RX ORDER — INSULIN PUMP SYRINGE, 3 ML
EACH MISCELLANEOUS
Qty: 1 EACH | Refills: 0 | Status: SHIPPED | OUTPATIENT
Start: 2024-07-30 | End: 2025-07-30

## 2024-07-30 RX ORDER — ASCORBIC ACID 500 MG
1 TABLET ORAL DAILY
COMMUNITY

## 2024-07-30 RX ORDER — LANCETS
EACH MISCELLANEOUS
Qty: 100 EACH | Refills: 3 | Status: SHIPPED | OUTPATIENT
Start: 2024-07-30

## 2024-07-30 RX ORDER — A/SINGAPORE/GP1908/2015 IVR-180 (AN A/MICHIGAN/45/2015 (H1N1)PDM09-LIKE VIRUS, A/HONG KONG/4801/2014, NYMC X-263B (H3N2) (AN A/HONG KONG/4801/2014-LIKE VIRUS), AND B/BRISBANE/60/2008, WILD TYPE (A B/BRISBANE/60/2008-LIKE VIRUS) 15; 15; 15 UG/.5ML; UG/.5ML; UG/.5ML
INJECTION, SUSPENSION INTRAMUSCULAR
COMMUNITY
End: 2024-08-02 | Stop reason: ALTCHOICE

## 2024-07-30 RX ORDER — PIOGLITAZONEHYDROCHLORIDE 15 MG/1
15 TABLET ORAL DAILY
Qty: 90 TABLET | Refills: 1 | Status: SHIPPED | OUTPATIENT
Start: 2024-07-30

## 2024-07-30 RX ORDER — EZETIMIBE 10 MG/1
10 TABLET ORAL DAILY
Qty: 90 TABLET | Refills: 3 | Status: SHIPPED | OUTPATIENT
Start: 2024-07-30

## 2024-07-30 RX ORDER — PRAVASTATIN SODIUM 20 MG/1
20 TABLET ORAL DAILY
Qty: 90 TABLET | Refills: 1 | Status: SHIPPED | OUTPATIENT
Start: 2024-07-30 | End: 2025-01-26

## 2024-07-30 RX ORDER — METOPROLOL SUCCINATE 25 MG/1
25 TABLET, EXTENDED RELEASE ORAL DAILY
Qty: 90 TABLET | Refills: 3 | Status: SHIPPED | OUTPATIENT
Start: 2024-07-30

## 2024-08-02 ENCOUNTER — OFFICE VISIT (OUTPATIENT)
Dept: NEPHROLOGY | Facility: CLINIC | Age: 79
End: 2024-08-02
Payer: MEDICARE

## 2024-08-02 VITALS — DIASTOLIC BLOOD PRESSURE: 56 MMHG | HEART RATE: 70 BPM | SYSTOLIC BLOOD PRESSURE: 102 MMHG | OXYGEN SATURATION: 95 %

## 2024-08-02 DIAGNOSIS — N20.0 KIDNEY STONE: ICD-10-CM

## 2024-08-02 DIAGNOSIS — E11.22 TYPE 2 DIABETES MELLITUS WITH STAGE 4 CHRONIC KIDNEY DISEASE, WITHOUT LONG-TERM CURRENT USE OF INSULIN: ICD-10-CM

## 2024-08-02 DIAGNOSIS — N28.1 ACQUIRED CYST OF KIDNEY: ICD-10-CM

## 2024-08-02 DIAGNOSIS — N18.32 STAGE 3B CHRONIC KIDNEY DISEASE: Primary | ICD-10-CM

## 2024-08-02 DIAGNOSIS — N18.4 TYPE 2 DIABETES MELLITUS WITH STAGE 4 CHRONIC KIDNEY DISEASE, WITHOUT LONG-TERM CURRENT USE OF INSULIN: ICD-10-CM

## 2024-08-02 DIAGNOSIS — I10 PRIMARY HYPERTENSION: ICD-10-CM

## 2024-08-02 PROCEDURE — 99999 PR PBB SHADOW E&M-EST. PATIENT-LVL III: CPT | Mod: PBBFAC,,, | Performed by: INTERNAL MEDICINE

## 2024-08-02 NOTE — PROGRESS NOTES
Subjective:       Patient ID: Ruy Ramos Jr. is a 79 y.o. White male who presents for return patient evaluation for chronic renal failure.      He had a kidney a kidney stone on April 19th, 2 mm and 4 mm.   He had COVID in August on 2020.  His blood glucose has been well controlled.       Review of Systems   Constitutional:  Positive for fatigue. Negative for appetite change, chills and fever.   HENT:  Negative for congestion.    Eyes:  Positive for visual disturbance.   Respiratory:  Positive for cough (chronic since COVID) and shortness of breath (with exertion).    Cardiovascular:  Negative for chest pain and leg swelling.   Gastrointestinal:  Negative for abdominal pain, diarrhea, nausea and vomiting.   Genitourinary:  Negative for difficulty urinating, dysuria and hematuria.   Musculoskeletal:  Positive for arthralgias (B shoulders and fingers). Negative for myalgias.   Skin:  Negative for rash.   Neurological:  Positive for numbness (feet). Negative for dizziness and headaches.   Psychiatric/Behavioral:  Negative for sleep disturbance.        The past medical, family and social histories were reviewed for this encounter.     BP (!) 102/56 (BP Location: Left arm, Patient Position: Sitting, BP Method: Large (Manual))   Pulse 70   SpO2 95%     Objective:      Physical Exam  Vitals reviewed.   Constitutional:       General: He is not in acute distress.     Appearance: He is well-developed.   HENT:      Head: Normocephalic and atraumatic.   Eyes:      General: No scleral icterus.  Pulmonary:      Effort: Pulmonary effort is normal. No respiratory distress.   Neurological:      Mental Status: He is alert and oriented to person, place, and time.   Psychiatric:         Mood and Affect: Mood normal.         Behavior: Behavior normal.         Assessment:       1. CKD (chronic kidney disease) stage 4, GFR 15-29 ml/min    2. Primary hypertension    3. Type 2 diabetes mellitus with stage 4 chronic kidney  disease, without long-term current use of insulin    4. Kidney stone    5. Acquired cyst of kidney        Lab Results   Component Value Date    CREATININE 2.30 (H) 06/20/2024    BUN 51 (H) 06/20/2024     06/20/2024    K 5.3 06/20/2024     06/20/2024    CO2 21 06/20/2024     Lab Results   Component Value Date    PTH 24 12/07/2023    CALCIUM 9.1 06/20/2024    PHOS 3.0 04/27/2023     Lab Results   Component Value Date    HCT 42.6 06/14/2023     Prot/Creat Ratio, Urine   Date Value Ref Range Status   04/27/2023 0.29 (H) 0.00 - 0.20 Final   02/09/2023 0.25 (H) 0.00 - 0.20 Final   02/01/2023 0.34 (H) 0.00 - 0.20 Final     Plan:   Return to clinic in 3 months.  Labs for next visit include rp, pth, upc per standing orders.    Baseline creatinine is 1.5-1.7 prior to 2020.  He has a new baseline since his COVID infection in August of 2020 of 2.0-2.3.  PTH is 24 with a calcium of 9.2.  Renal US shows R 9.2 cm L 9.4 cm.  He sees Urology in [place].  UPC is 0.29 on an ARB.  Your blood pressure is controlled on your current medications.  Keep in mind that weight loss often improves blood pressure.  If you do diet and lose weight we will likely need to adjust your medications.  The effects of diabetes as it relates to kidney function was discussed.  As for the control of your blood sugar, please follow up with your PCP or Endocrinology.  We discussed kidney cysts and that they are common (occur in 50% of patients over 50 years old).  We do like to monitor them with repeat ultrasound ever 1-2 years to document stability.  He did have a ruptured appendix in May of 2020 which could have been the event that affected his kidney function as well as COVID infection that same year in August.  He has uric acid stones.  Uric acid is 6.7.    KFRE 2-Year: 3.2% at 6/20/2024  8:07 AM  Calculated from:  Serum Creatinine: 2.30 mg/dL at 6/20/2024  8:07 AM  Urine Albumin Creatinine Ratio: 52 mg/g creat at 6/20/2024  8:07 AM  Age: 79  years  Sex: Male at 6/20/2024  8:07 AM  Has CKD-3 to CKD-5: Yes    KFRE 5-Year: 9.7% at 6/20/2024  8:07 AM  Calculated from:  Serum Creatinine: 2.30 mg/dL at 6/20/2024  8:07 AM  Urine Albumin Creatinine Ratio: 52 mg/g creat at 6/20/2024  8:07 AM  Age: 79 years  Sex: Male at 6/20/2024  8:07 AM  Has CKD-3 to CKD-5: Yes

## 2024-08-02 NOTE — PROGRESS NOTES
SUBJECTIVE:    Patient ID: Ruy Ramos Jr. is a 79 y.o. male.    Chief Complaint: Follow-up (Bottles brought//Pt here for 6 mo follow up//discuss blood sugar test strips//Pt brought in home  cuff to be callibrated. Home cuff readin/78 P:59//JL)    79-year-old male here for six-month checkup.  Has paroxysmal atrial fibrillation and follows up with Dr. Gunner Crisostomo.  He no longer takes aspirin for blood thinner    Diabetes type 2 managed with pioglitazone and Invokana.  A1c today was 7.2, he would like a new glucose monitor strips and lancets    Follows up yearly in  for PSA testing with Dr. Velez urology    Osteoarthritis, has hips and knees aching.    CKD 4-followed by Dr. Cano nephrology-BUN 51 creatinine 2.3 GFR 28    Hyperlipidemia-takes Zetia 10 mg and pravastatin 20 mg    He declines Shingrix vaccine at this time    Follow-up  Associated symptoms include neck pain. Pertinent negatives include no arthralgias, chest pain, headaches, joint swelling, vomiting or weakness.       Telephone on 2024   Component Date Value Ref Range Status    Glucose 2024 156 (H)  65 - 99 mg/dL Final    BUN 2024 51 (H)  7 - 25 mg/dL Final    Creatinine 2024 2.30 (H)  0.70 - 1.22 mg/dL Final    eGFR 2024 28 (L)  > OR = 60 mL/min/1.73m2 Final    BUN/Creatinine Ratio 2024 22  6 - 22 (calc) Final    Sodium 2024 137  135 - 146 mmol/L Final    Potassium 2024 5.3  3.5 - 5.3 mmol/L Final    Chloride 2024 107  98 - 110 mmol/L Final    CO2 2024 21  20 - 32 mmol/L Final    Calcium 2024 9.1  8.6 - 10.3 mg/dL Final    Total Protein 2024 7.1  6.1 - 8.1 g/dL Final    Albumin 2024 4.4  3.6 - 5.1 g/dL Final    Globulin, Total 2024 2.7  1.9 - 3.7 g/dL (calc) Final    Albumin/Globulin Ratio 2024 1.6  1.0 - 2.5 (calc) Final    Total Bilirubin 2024 0.6  0.2 - 1.2 mg/dL Final    Alkaline Phosphatase 2024 102  35 - 144 U/L  Final    AST 06/20/2024 15  10 - 35 U/L Final    ALT 06/20/2024 15  9 - 46 U/L Final    Cholesterol 06/20/2024 208 (H)  <200 mg/dL Final    HDL 06/20/2024 41  > OR = 40 mg/dL Final    Triglycerides 06/20/2024 416 (H)  <150 mg/dL Final    LDL Cholesterol 06/20/2024 SEE COMMENT  mg/dL (calc) Final    HDL/Cholesterol Ratio 06/20/2024 5.1 (H)  <5.0 (calc) Final    Non HDL Chol. (LDL+VLDL) 06/20/2024 167 (H)  <130 mg/dL (calc) Final    Creatinine, Urine 06/20/2024 103  20 - 320 mg/dL Final    Microalb, Ur 06/20/2024 5.4  See Note: mg/dL Final    Microalb/Creat Ratio 06/20/2024 52 (H)  <30 mg/g creat Final    TSH w/reflex to FT4 06/20/2024 2.08  0.40 - 4.50 mIU/L Final    Hemoglobin A1C 06/20/2024 7.2 (H)  <5.7 % of total Hgb Final   Patient Outreach on 03/06/2024   Component Date Value Ref Range Status    Left Eye DM Retinopathy 03/04/2024 Negative   Final    Right Eye DM Retinopathy 03/04/2024 Negative   Final   Telephone on 02/02/2024   Component Date Value Ref Range Status    Glucose 02/09/2024 147 (H)  65 - 99 mg/dL Final    BUN 02/09/2024 27 (H)  7 - 25 mg/dL Final    Creatinine 02/09/2024 1.78 (H)  0.70 - 1.22 mg/dL Final    eGFR 02/09/2024 38 (L)  > OR = 60 mL/min/1.73m2 Final    BUN/Creatinine Ratio 02/09/2024 15  6 - 22 (calc) Final    Sodium 02/09/2024 141  135 - 146 mmol/L Final    Potassium 02/09/2024 4.6  3.5 - 5.3 mmol/L Final    Chloride 02/09/2024 106  98 - 110 mmol/L Final    CO2 02/09/2024 24  20 - 32 mmol/L Final    Calcium 02/09/2024 9.2  8.6 - 10.3 mg/dL Final       Past Medical History:   Diagnosis Date    Diabetes mellitus, type 2     DM type 2 without retinopathy     Hypertension     Kidney stone      Social History     Socioeconomic History    Marital status:    Tobacco Use    Smoking status: Never    Smokeless tobacco: Never   Substance and Sexual Activity    Alcohol use: No    Drug use: No     Social Determinants of Health     Financial Resource Strain: Patient Declined (1/22/2024)     Overall Financial Resource Strain (CARDIA)     Difficulty of Paying Living Expenses: Patient declined   Food Insecurity: Patient Declined (1/22/2024)    Hunger Vital Sign     Worried About Running Out of Food in the Last Year: Patient declined     Ran Out of Food in the Last Year: Patient declined   Transportation Needs: Patient Declined (1/22/2024)    PRAPARE - Transportation     Lack of Transportation (Medical): Patient declined     Lack of Transportation (Non-Medical): Patient declined   Physical Activity: Insufficiently Active (1/22/2024)    Exercise Vital Sign     Days of Exercise per Week: 3 days     Minutes of Exercise per Session: 40 min   Stress: No Stress Concern Present (1/22/2024)    [employer] - Occupational Stress Questionnaire     Feeling of Stress : Not at all   Housing Stability: Low Risk  (1/22/2024)    Housing Stability Vital Sign     Unable to Pay for Housing in the Last Year: No     Number of Places Lived in the Last Year: 1     Unstable Housing in the Last Year: No     Past Surgical History:   Procedure Laterality Date    APPENDECTOMY  5/30/2020    Procedure: APPENDECTOMY;  Surgeon: Eusebio Gil III, MD;  Location: [facility] OR;  Service: General;;    arm fracture surgery      FRACTURE SURGERY      Left arm    LAPAROSCOPIC APPENDECTOMY N/A 5/30/2020    Procedure: APPENDECTOMY, LAPAROSCOPIC VS OPEN;  Surgeon: Eusebio Gil III, MD;  Location: [facility] OR;  Service: General;  Laterality: N/A;    Open Appendectomy w/ partial cecectomy   05/31/2020    Dr. Gil      No family history on file.    The CVD Risk score (D'Agostino, et al., 2008) failed to calculate for the following reasons:    The 2008 CVD risk score is only valid for ages 30 to 74    Tests to Keep You Healthy    Eye Exam: Met on 3/4/2024  Last Blood Pressure <= 139/89 (7/30/2024): NO      Review of patient's allergies indicates:  No Known Allergies    Current Outpatient Medications:     ascorbic acid,  vitamin C, (VITAMIN C) 500 MG tablet, Take 1 tablet by mouth once daily., Disp: , Rfl:     blood sugar diagnostic (ACCU-CHEK AUDREY PLUS TEST STRP) Strp, 1 strip by Misc.(Non-Drug; Combo Route) route once daily., Disp: 100 strip, Rfl: 1    canagliflozin (INVOKANA) 300 mg Tab tablet, Take 1 tablet (300 mg total) by mouth once daily., Disp: 90 tablet, Rfl: 3    folic acid-vit B6-vit B12 (FOLPLEX 2.2) 2.2-25-0.5 mg Tab, Take 1 tablet by mouth once daily., Disp: 90 each, Rfl: 3    MULTIVITAMIN ORAL, Take 1 tablet by mouth once daily. , Disp: , Rfl:     omega 3-dha-epa-fish oil 900-1,400 mg CpDR, Take 2 capsules by mouth 2 (two) times daily. , Disp: , Rfl:     telmisartan (MICARDIS) 40 MG Tab, Take 40 mg by mouth once daily., Disp: , Rfl:     vitamin D (VITAMIN D3) 1000 units Tab, Take 1,000 Units by mouth once daily., Disp: , Rfl:     zinc acetate 50 mg (zinc) Cap, Take by mouth., Disp: , Rfl:     aspirin (ECOTRIN) 81 MG EC tablet, Take 81 mg by mouth once daily. (Patient not taking: Reported on 7/30/2024), Disp: , Rfl:     blood sugar diagnostic Strp, To check BG 1 times daily, to use with insurance preferred meter, Disp: 100 strip, Rfl: 3    blood-glucose meter kit, To check BG 1 times daily, to use with insurance preferred meter, Disp: 1 each, Rfl: 0    ezetimibe (ZETIA) 10 mg tablet, Take 1 tablet (10 mg total) by mouth once daily., Disp: 90 tablet, Rfl: 3    flu vac 2020 [id number],PF, (FLUAD QUAD 2020-21,65Y UP,,PF,) 60 mcg (15 mcg x 4)/0.5 mL Syrg, Fluad Quad 2055-6778(65yr up)(PF) 60 mcg (15 mcg x 4)/0.5mL IM syringe  PHARMACY ADMINISTERED, Disp: , Rfl:     L. acidophilus/L. rhamnosus (DIGESTIVE HEALTH PROBIOTIC ORAL), Take 1 tablet by mouth once daily. (Patient not taking: Reported on 7/30/2024), Disp: , Rfl:     lancets Misc, To check BG 1 times daily, to use with insurance preferred meter, Disp: 100 each, Rfl: 3    metoprolol succinate (TOPROL-XL) 25 MG 24 hr tablet, Take 1 tablet (25 mg total) by mouth  "once daily., Disp: 90 tablet, Rfl: 3    pioglitazone (ACTOS) 15 MG tablet, Take 1 tablet (15 mg total) by mouth once daily., Disp: 90 tablet, Rfl: 1    pravastatin (PRAVACHOL) 20 MG tablet, Take 1 tablet (20 mg total) by mouth once daily. For cholesterol., Disp: 90 tablet, Rfl: 1    Review of Systems   Constitutional:  Negative for activity change and unexpected weight change.   HENT:  Positive for rhinorrhea. Negative for hearing loss and trouble swallowing.    Eyes:  Positive for discharge. Negative for visual disturbance.   Respiratory:  Negative for chest tightness and wheezing.    Cardiovascular:  Negative for chest pain and palpitations.   Gastrointestinal:  Negative for blood in stool, constipation, diarrhea and vomiting.   Endocrine: Negative for polydipsia and polyuria.   Genitourinary:  Negative for difficulty urinating, hematuria and urgency.   Musculoskeletal:  Positive for neck pain. Negative for arthralgias and joint swelling.   Neurological:  Negative for weakness and headaches.   Psychiatric/Behavioral:  Negative for confusion and dysphoric mood.            Objective:      Vitals:    07/30/24 1008   BP: (!) 140/70   Pulse: (!) 54   SpO2: 98%   Weight: 81.6 kg (180 lb)   Height: 5' 7" (1.702 m)     Physical Exam  Vitals and nursing note reviewed.   Constitutional:       General: He is not in acute distress.     Appearance: Normal appearance. He is well-developed. He is not toxic-appearing.   HENT:      Head: Normocephalic and atraumatic.      Right Ear: Tympanic membrane and external ear normal.      Left Ear: Tympanic membrane and external ear normal.      Nose: Nose normal.      Mouth/Throat:      Pharynx: Oropharynx is clear.   Eyes:      Pupils: Pupils are equal, round, and reactive to light.   Neck:      Thyroid: No thyromegaly.      Vascular: No carotid bruit.   Cardiovascular:      Rate and Rhythm: Normal rate and regular rhythm.      Heart sounds: Normal heart sounds. No murmur " heard.  Pulmonary:      Effort: Pulmonary effort is normal.      Breath sounds: Normal breath sounds. No wheezing or rales.   Abdominal:      General: Bowel sounds are normal. There is no distension.      Palpations: Abdomen is soft.      Tenderness: There is no abdominal tenderness.   Musculoskeletal:         General: No tenderness or deformity. Normal range of motion.      Cervical back: Normal range of motion and neck supple.      Lumbar back: Normal. No spasms.      Comments: Bends 90 degrees at  waist knees are crepitant bilaterally good range of motion, no pitting edema   Lymphadenopathy:      Cervical: No cervical adenopathy.   Skin:     General: Skin is warm and dry.      Findings: No rash.   Neurological:      Mental Status: He is alert and oriented to person, place, and time. Mental status is at baseline.      Cranial Nerves: No cranial nerve deficit.      Coordination: Coordination normal.      Gait: Gait normal.   Psychiatric:         Mood and Affect: Mood normal.         Behavior: Behavior normal.         Thought Content: Thought content normal.         Judgment: Judgment normal.           Assessment:       1. Controlled type 2 diabetes mellitus with complication, without long-term current use of insulin    2. History of carotid endarterectomy    3. Mixed hyperlipidemia    4. Diabetic polyneuropathy associated with type 2 diabetes mellitus    5. Hyperlipidemia, unspecified hyperlipidemia type    6. Primary hypertension    7. Paroxysmal atrial fibrillation    8. Stage 4 chronic kidney disease    9. Nephrolithiasis    10. Diabetic nephropathy associated with diabetes mellitus due to underlying condition    11. Kidney stone    12. Acquired cyst of kidney    13. Type 2 diabetes mellitus with stage 3b chronic kidney disease, without long-term current use of insulin         Plan:     1. Type 2 diabetes, A1c 7.2 continue management as is reorder glucose monitor strips and lancets  2. Asymptomatic from carotid  endarterectomy  3. Cholesterol 208 HDL 41 TG elevated at 416 LDL not calculated-continue Zetia and pravastatin  4. Diabetic polyneuropathy-mild tingling to the toes  5. Hyperlipidemia, see above  6. Hypertension-borderline elevation at 140/70-reduce salt in the diet continue medications  7. Paroxysmal atrial fibrillation-sinus rhythm today, off of aspirin  8.stage 4 kidney disease-avoid NSAIDs, continue adequate hydration Dr. Cano following with labs  Follow up in about 6 months (around 1/30/2025) for Diabetic Check-Up.        8/2/2024 Rigoberto Bunch

## 2024-08-05 ENCOUNTER — TELEPHONE (OUTPATIENT)
Dept: NEPHROLOGY | Facility: CLINIC | Age: 79
End: 2024-08-05
Payer: MEDICARE

## 2024-11-01 ENCOUNTER — TELEPHONE (OUTPATIENT)
Dept: NEPHROLOGY | Facility: CLINIC | Age: 79
End: 2024-11-01
Payer: MEDICARE

## 2024-11-01 DIAGNOSIS — N18.32 STAGE 3B CHRONIC KIDNEY DISEASE: Primary | ICD-10-CM

## 2024-11-08 ENCOUNTER — OFFICE VISIT (OUTPATIENT)
Dept: NEPHROLOGY | Facility: CLINIC | Age: 79
End: 2024-11-08
Payer: MEDICARE

## 2024-11-08 VITALS — DIASTOLIC BLOOD PRESSURE: 66 MMHG | SYSTOLIC BLOOD PRESSURE: 118 MMHG

## 2024-11-08 DIAGNOSIS — N18.32 STAGE 3B CHRONIC KIDNEY DISEASE: Primary | ICD-10-CM

## 2024-11-08 DIAGNOSIS — N28.1 ACQUIRED CYST OF KIDNEY: ICD-10-CM

## 2024-11-08 DIAGNOSIS — E11.22 TYPE 2 DIABETES MELLITUS WITH STAGE 4 CHRONIC KIDNEY DISEASE, WITHOUT LONG-TERM CURRENT USE OF INSULIN: ICD-10-CM

## 2024-11-08 DIAGNOSIS — N20.0 KIDNEY STONE: ICD-10-CM

## 2024-11-08 DIAGNOSIS — N18.4 TYPE 2 DIABETES MELLITUS WITH STAGE 4 CHRONIC KIDNEY DISEASE, WITHOUT LONG-TERM CURRENT USE OF INSULIN: ICD-10-CM

## 2024-11-08 DIAGNOSIS — I10 PRIMARY HYPERTENSION: ICD-10-CM

## 2024-11-08 PROCEDURE — 99999 PR PBB SHADOW E&M-EST. PATIENT-LVL III: CPT | Mod: PBBFAC,,, | Performed by: INTERNAL MEDICINE

## 2024-11-17 ENCOUNTER — CLINICAL SUPPORT (OUTPATIENT)
Dept: CARDIOLOGY | Facility: HOSPITAL | Age: 79
DRG: 066 | End: 2024-11-17
Attending: STUDENT IN AN ORGANIZED HEALTH CARE EDUCATION/TRAINING PROGRAM
Payer: MEDICARE

## 2024-11-17 ENCOUNTER — HOSPITAL ENCOUNTER (INPATIENT)
Facility: HOSPITAL | Age: 79
LOS: 1 days | Discharge: HOME | DRG: 066 | End: 2024-11-18
Attending: STUDENT IN AN ORGANIZED HEALTH CARE EDUCATION/TRAINING PROGRAM | Admitting: INTERNAL MEDICINE
Payer: MEDICARE

## 2024-11-17 DIAGNOSIS — E11.22 TYPE 2 DIABETES MELLITUS WITH STAGE 3B CHRONIC KIDNEY DISEASE, WITHOUT LONG-TERM CURRENT USE OF INSULIN: ICD-10-CM

## 2024-11-17 DIAGNOSIS — R29.818 ACUTE FOCAL NEUROLOGICAL DEFICIT: ICD-10-CM

## 2024-11-17 DIAGNOSIS — R29.818 NEUROLOGIC DEFICIT DUE TO ACUTE ISCHEMIC STROKE: ICD-10-CM

## 2024-11-17 DIAGNOSIS — I63.9 NEUROLOGIC DEFICIT DUE TO ACUTE ISCHEMIC STROKE: ICD-10-CM

## 2024-11-17 DIAGNOSIS — R29.818 NEUROLOGIC DEFICIT DUE TO ACUTE ISCHEMIC CEREBROVASCULAR ACCIDENT (CVA): Primary | ICD-10-CM

## 2024-11-17 DIAGNOSIS — N18.32 TYPE 2 DIABETES MELLITUS WITH STAGE 3B CHRONIC KIDNEY DISEASE, WITHOUT LONG-TERM CURRENT USE OF INSULIN: ICD-10-CM

## 2024-11-17 DIAGNOSIS — I63.9 NEUROLOGIC DEFICIT DUE TO ACUTE ISCHEMIC CEREBROVASCULAR ACCIDENT (CVA): Primary | ICD-10-CM

## 2024-11-17 PROBLEM — R42 DIZZINESS: Status: ACTIVE | Noted: 2024-11-17

## 2024-11-17 PROBLEM — R20.2 FACIAL PARESTHESIA: Status: ACTIVE | Noted: 2024-11-17

## 2024-11-17 PROBLEM — H81.11 BENIGN PAROXYSMAL POSITIONAL VERTIGO OF RIGHT EAR: Status: RESOLVED | Noted: 2024-11-17 | Resolved: 2024-11-17

## 2024-11-17 PROBLEM — H53.2 TRANSIENT DIPLOPIA: Status: ACTIVE | Noted: 2024-11-17

## 2024-11-17 PROBLEM — H81.11 BENIGN PAROXYSMAL POSITIONAL VERTIGO OF RIGHT EAR: Status: ACTIVE | Noted: 2024-11-17

## 2024-11-17 LAB
ALBUMIN SERPL BCP-MCNC: 4.6 G/DL (ref 3.5–5.2)
ALP SERPL-CCNC: 105 U/L (ref 55–135)
ALT SERPL W/O P-5'-P-CCNC: 20 U/L (ref 10–44)
ANION GAP SERPL CALC-SCNC: 12 MMOL/L (ref 8–16)
AST SERPL-CCNC: 20 U/L (ref 10–40)
BASOPHILS # BLD AUTO: 0.08 K/UL (ref 0–0.2)
BASOPHILS NFR BLD: 1.1 % (ref 0–1.9)
BILIRUB SERPL-MCNC: 0.5 MG/DL (ref 0.1–1)
BUN SERPL-MCNC: 20 MG/DL (ref 8–23)
CALCIUM SERPL-MCNC: 9.5 MG/DL (ref 8.7–10.5)
CHLORIDE SERPL-SCNC: 106 MMOL/L (ref 95–110)
CHOLEST SERPL-MCNC: 189 MG/DL (ref 120–199)
CHOLEST/HDLC SERPL: 4.2 {RATIO} (ref 2–5)
CO2 SERPL-SCNC: 22 MMOL/L (ref 23–29)
CREAT SERPL-MCNC: 1.6 MG/DL (ref 0.5–1.4)
CREAT SERPL-MCNC: 1.8 MG/DL (ref 0.5–1.4)
DIFFERENTIAL METHOD BLD: ABNORMAL
EOSINOPHIL # BLD AUTO: 0.1 K/UL (ref 0–0.5)
EOSINOPHIL NFR BLD: 0.8 % (ref 0–8)
ERYTHROCYTE [DISTWIDTH] IN BLOOD BY AUTOMATED COUNT: 14.5 % (ref 11.5–14.5)
EST. GFR  (NO RACE VARIABLE): 43 ML/MIN/1.73 M^2
GLUCOSE SERPL-MCNC: 180 MG/DL (ref 70–110)
HCT VFR BLD AUTO: 47.5 % (ref 40–54)
HDLC SERPL-MCNC: 45 MG/DL (ref 40–75)
HDLC SERPL: 23.8 % (ref 20–50)
HGB BLD-MCNC: 15.9 G/DL (ref 14–18)
IMM GRANULOCYTES # BLD AUTO: 0.07 K/UL (ref 0–0.04)
IMM GRANULOCYTES NFR BLD AUTO: 0.9 % (ref 0–0.5)
INR PPP: 1 (ref 0.8–1.2)
LDLC SERPL CALC-MCNC: 78.6 MG/DL (ref 63–159)
LIPASE SERPL-CCNC: 22 U/L (ref 4–60)
LYMPHOCYTES # BLD AUTO: 1.7 K/UL (ref 1–4.8)
LYMPHOCYTES NFR BLD: 22.3 % (ref 18–48)
MCH RBC QN AUTO: 30.6 PG (ref 27–31)
MCHC RBC AUTO-ENTMCNC: 33.5 G/DL (ref 32–36)
MCV RBC AUTO: 91 FL (ref 82–98)
MONOCYTES # BLD AUTO: 0.6 K/UL (ref 0.3–1)
MONOCYTES NFR BLD: 7.8 % (ref 4–15)
NEUTROPHILS # BLD AUTO: 5 K/UL (ref 1.8–7.7)
NEUTROPHILS NFR BLD: 67.1 % (ref 38–73)
NONHDLC SERPL-MCNC: 144 MG/DL
NRBC BLD-RTO: 0 /100 WBC
PLATELET # BLD AUTO: 197 K/UL (ref 150–450)
PMV BLD AUTO: 10.3 FL (ref 9.2–12.9)
POCT GLUCOSE: 186 MG/DL (ref 70–110)
POCT GLUCOSE: 209 MG/DL (ref 70–110)
POTASSIUM SERPL-SCNC: 4 MMOL/L (ref 3.5–5.1)
PROT SERPL-MCNC: 7.5 G/DL (ref 6–8.4)
PROTHROMBIN TIME: 10.9 SEC (ref 9–12.5)
RBC # BLD AUTO: 5.2 M/UL (ref 4.6–6.2)
SAMPLE: ABNORMAL
SODIUM SERPL-SCNC: 140 MMOL/L (ref 136–145)
TRIGL SERPL-MCNC: 327 MG/DL (ref 30–150)
TROPONIN I SERPL HS-MCNC: 5.8 PG/ML (ref 0–14.9)
TSH SERPL DL<=0.005 MIU/L-ACNC: 2.63 UIU/ML (ref 0.34–5.6)
WBC # BLD AUTO: 7.43 K/UL (ref 3.9–12.7)

## 2024-11-17 PROCEDURE — 25000003 PHARM REV CODE 250: Performed by: EMERGENCY MEDICINE

## 2024-11-17 PROCEDURE — 84443 ASSAY THYROID STIM HORMONE: CPT | Performed by: STUDENT IN AN ORGANIZED HEALTH CARE EDUCATION/TRAINING PROGRAM

## 2024-11-17 PROCEDURE — 82962 GLUCOSE BLOOD TEST: CPT

## 2024-11-17 PROCEDURE — 94799 UNLISTED PULMONARY SVC/PX: CPT

## 2024-11-17 PROCEDURE — 21400001 HC TELEMETRY ROOM

## 2024-11-17 PROCEDURE — 25000003 PHARM REV CODE 250: Performed by: NURSE PRACTITIONER

## 2024-11-17 PROCEDURE — 80053 COMPREHEN METABOLIC PANEL: CPT | Performed by: STUDENT IN AN ORGANIZED HEALTH CARE EDUCATION/TRAINING PROGRAM

## 2024-11-17 PROCEDURE — 93306 TTE W/DOPPLER COMPLETE: CPT

## 2024-11-17 PROCEDURE — G0378 HOSPITAL OBSERVATION PER HR: HCPCS

## 2024-11-17 PROCEDURE — 85610 PROTHROMBIN TIME: CPT | Performed by: STUDENT IN AN ORGANIZED HEALTH CARE EDUCATION/TRAINING PROGRAM

## 2024-11-17 PROCEDURE — 99285 EMERGENCY DEPT VISIT HI MDM: CPT | Mod: 25

## 2024-11-17 PROCEDURE — 99900035 HC TECH TIME PER 15 MIN (STAT)

## 2024-11-17 PROCEDURE — 83690 ASSAY OF LIPASE: CPT | Performed by: STUDENT IN AN ORGANIZED HEALTH CARE EDUCATION/TRAINING PROGRAM

## 2024-11-17 PROCEDURE — 85025 COMPLETE CBC W/AUTO DIFF WBC: CPT | Performed by: STUDENT IN AN ORGANIZED HEALTH CARE EDUCATION/TRAINING PROGRAM

## 2024-11-17 PROCEDURE — 84484 ASSAY OF TROPONIN QUANT: CPT | Performed by: STUDENT IN AN ORGANIZED HEALTH CARE EDUCATION/TRAINING PROGRAM

## 2024-11-17 PROCEDURE — 93005 ELECTROCARDIOGRAM TRACING: CPT | Performed by: INTERNAL MEDICINE

## 2024-11-17 PROCEDURE — 82565 ASSAY OF CREATININE: CPT

## 2024-11-17 PROCEDURE — 63600175 PHARM REV CODE 636 W HCPCS: Performed by: NURSE PRACTITIONER

## 2024-11-17 PROCEDURE — 93010 ELECTROCARDIOGRAM REPORT: CPT | Mod: ,,, | Performed by: INTERNAL MEDICINE

## 2024-11-17 PROCEDURE — 36415 COLL VENOUS BLD VENIPUNCTURE: CPT | Performed by: STUDENT IN AN ORGANIZED HEALTH CARE EDUCATION/TRAINING PROGRAM

## 2024-11-17 PROCEDURE — 99900031 HC PATIENT EDUCATION (STAT)

## 2024-11-17 PROCEDURE — 93306 TTE W/DOPPLER COMPLETE: CPT | Mod: 26,,, | Performed by: INTERNAL MEDICINE

## 2024-11-17 PROCEDURE — 94761 N-INVAS EAR/PLS OXIMETRY MLT: CPT

## 2024-11-17 PROCEDURE — 80061 LIPID PANEL: CPT | Performed by: STUDENT IN AN ORGANIZED HEALTH CARE EDUCATION/TRAINING PROGRAM

## 2024-11-17 RX ORDER — GLUCAGON 1 MG
1 KIT INJECTION
Status: DISCONTINUED | OUTPATIENT
Start: 2024-11-17 | End: 2024-11-18 | Stop reason: HOSPADM

## 2024-11-17 RX ORDER — MECLIZINE HCL 12.5 MG 12.5 MG/1
50 TABLET ORAL
Status: COMPLETED | OUTPATIENT
Start: 2024-11-17 | End: 2024-11-17

## 2024-11-17 RX ORDER — INSULIN ASPART 100 [IU]/ML
0-10 INJECTION, SOLUTION INTRAVENOUS; SUBCUTANEOUS
Status: DISCONTINUED | OUTPATIENT
Start: 2024-11-17 | End: 2024-11-18 | Stop reason: HOSPADM

## 2024-11-17 RX ORDER — LABETALOL HYDROCHLORIDE 5 MG/ML
10 INJECTION, SOLUTION INTRAVENOUS
Status: DISCONTINUED | OUTPATIENT
Start: 2024-11-17 | End: 2024-11-18 | Stop reason: HOSPADM

## 2024-11-17 RX ORDER — SODIUM,POTASSIUM PHOSPHATES 280-250MG
2 POWDER IN PACKET (EA) ORAL
Status: DISCONTINUED | OUTPATIENT
Start: 2024-11-17 | End: 2024-11-18 | Stop reason: HOSPADM

## 2024-11-17 RX ORDER — IBUPROFEN 200 MG
24 TABLET ORAL
Status: DISCONTINUED | OUTPATIENT
Start: 2024-11-17 | End: 2024-11-18 | Stop reason: HOSPADM

## 2024-11-17 RX ORDER — SODIUM CHLORIDE 0.9 % (FLUSH) 0.9 %
10 SYRINGE (ML) INJECTION
Status: DISCONTINUED | OUTPATIENT
Start: 2024-11-17 | End: 2024-11-18 | Stop reason: HOSPADM

## 2024-11-17 RX ORDER — LANOLIN ALCOHOL/MO/W.PET/CERES
800 CREAM (GRAM) TOPICAL
Status: DISCONTINUED | OUTPATIENT
Start: 2024-11-17 | End: 2024-11-18 | Stop reason: HOSPADM

## 2024-11-17 RX ORDER — ASPIRIN 325 MG
325 TABLET, DELAYED RELEASE (ENTERIC COATED) ORAL DAILY
Status: DISCONTINUED | OUTPATIENT
Start: 2024-11-18 | End: 2024-11-18

## 2024-11-17 RX ORDER — IBUPROFEN 200 MG
16 TABLET ORAL
Status: DISCONTINUED | OUTPATIENT
Start: 2024-11-17 | End: 2024-11-18 | Stop reason: HOSPADM

## 2024-11-17 RX ORDER — MECLIZINE HCL 12.5 MG 12.5 MG/1
25 TABLET ORAL 3 TIMES DAILY PRN
Status: DISCONTINUED | OUTPATIENT
Start: 2024-11-17 | End: 2024-11-18 | Stop reason: HOSPADM

## 2024-11-17 RX ORDER — ATORVASTATIN CALCIUM 40 MG/1
40 TABLET, FILM COATED ORAL NIGHTLY
Status: DISCONTINUED | OUTPATIENT
Start: 2024-11-17 | End: 2024-11-18 | Stop reason: HOSPADM

## 2024-11-17 RX ORDER — NAPROXEN SODIUM 220 MG/1
81 TABLET, FILM COATED ORAL
Status: COMPLETED | OUTPATIENT
Start: 2024-11-17 | End: 2024-11-17

## 2024-11-17 RX ORDER — BISACODYL 10 MG/1
10 SUPPOSITORY RECTAL DAILY PRN
Status: DISCONTINUED | OUTPATIENT
Start: 2024-11-17 | End: 2024-11-18 | Stop reason: HOSPADM

## 2024-11-17 RX ORDER — ONDANSETRON HYDROCHLORIDE 2 MG/ML
4 INJECTION, SOLUTION INTRAVENOUS EVERY 12 HOURS PRN
Status: DISCONTINUED | OUTPATIENT
Start: 2024-11-17 | End: 2024-11-18 | Stop reason: HOSPADM

## 2024-11-17 RX ORDER — ONDANSETRON HYDROCHLORIDE 2 MG/ML
4 INJECTION, SOLUTION INTRAVENOUS
Status: DISPENSED | OUTPATIENT
Start: 2024-11-17 | End: 2024-11-17

## 2024-11-17 RX ORDER — ENOXAPARIN SODIUM 100 MG/ML
40 INJECTION SUBCUTANEOUS EVERY 24 HOURS
Status: DISCONTINUED | OUTPATIENT
Start: 2024-11-17 | End: 2024-11-18 | Stop reason: HOSPADM

## 2024-11-17 RX ORDER — CHOLECALCIFEROL (VITAMIN D3) 25 MCG
1000 TABLET ORAL DAILY
Status: DISCONTINUED | OUTPATIENT
Start: 2024-11-18 | End: 2024-11-17

## 2024-11-17 RX ORDER — FAMOTIDINE 20 MG/1
20 TABLET, FILM COATED ORAL DAILY
Status: DISCONTINUED | OUTPATIENT
Start: 2024-11-18 | End: 2024-11-18 | Stop reason: HOSPADM

## 2024-11-17 RX ORDER — PROMETHAZINE HYDROCHLORIDE 25 MG/1
25 TABLET ORAL EVERY 6 HOURS PRN
Status: DISCONTINUED | OUTPATIENT
Start: 2024-11-17 | End: 2024-11-18 | Stop reason: HOSPADM

## 2024-11-17 RX ORDER — EZETIMIBE 10 MG/1
10 TABLET ORAL NIGHTLY
Status: DISCONTINUED | OUTPATIENT
Start: 2024-11-17 | End: 2024-11-18 | Stop reason: HOSPADM

## 2024-11-17 RX ADMIN — ATORVASTATIN CALCIUM 40 MG: 40 TABLET, FILM COATED ORAL at 08:11

## 2024-11-17 RX ADMIN — MECLIZINE 50 MG: 12.5 TABLET ORAL at 10:11

## 2024-11-17 RX ADMIN — EZETIMIBE 10 MG: 10 TABLET ORAL at 08:11

## 2024-11-17 RX ADMIN — ASPIRIN 81 MG 81 MG: 81 TABLET ORAL at 03:11

## 2024-11-17 RX ADMIN — ENOXAPARIN SODIUM 40 MG: 40 INJECTION SUBCUTANEOUS at 03:11

## 2024-11-17 NOTE — ASSESSMENT & PLAN NOTE
Patient is chronically on statin.will continue for now. Monitor clinically. Last LDL was   Lab Results   Component Value Date    LDLCALC 78.6 11/17/2024      Start atorvastatin Atorvastatin 40 mg in place of home medication of pravastatin 20

## 2024-11-17 NOTE — ASSESSMENT & PLAN NOTE
Patient with dizziness, nausea, right upper lip nausea and  intermittent diplopia,   Antithrombotics for secondary stroke prevention: Antiplatelets: Aspirin: 81 mg daily    Statins for secondary stroke prevention and hyperlipidemia, if present:   Statins: Atorvastatin- 40 mg daily    Aggressive risk factor modification: HTN, DM, HLD     Rehab efforts: The patient has been evaluated by a stroke team provider and the therapy needs have been fully considered based off the presenting complaints and exam findings. The following therapy evaluations are needed: PT evaluate and treat, OT evaluate and treat    Diagnostics ordered/pending: Carotid ultrasound to assess vasculature, CT scan of head without contrast to asses brain parenchyma, HgbA1C to assess blood glucose levels, Lipid Profile to assess cholesterol levels, MRA head to assess vasculature, MRI head without contrast to assess brain parenchyma, TTE to assess cardiac function/status , TSH to assess thyroid function    VTE prophylaxis: Enoxaparin 40 mg SQ every 24 hours    BP parameters: Infarct: No intervention, SBP <220

## 2024-11-17 NOTE — SUBJECTIVE & OBJECTIVE
Past Medical History:   Diagnosis Date    Diabetes mellitus, type 2     DM type 2 without retinopathy     Hypertension     Kidney stone        Past Surgical History:   Procedure Laterality Date    APPENDECTOMY  5/30/2020    Procedure: APPENDECTOMY;  Surgeon: Eusebio Gil III, MD;  Location: [facility] OR;  Service: General;;    arm fracture surgery      FRACTURE SURGERY      Left arm    LAPAROSCOPIC APPENDECTOMY N/A 5/30/2020    Procedure: APPENDECTOMY, LAPAROSCOPIC VS OPEN;  Surgeon: Eusebio Gil III, MD;  Location: [facility];  Service: General;  Laterality: N/A;    Open Appendectomy w/ partial cecectomy   05/31/2020    Dr. Gil        Review of patient's allergies indicates:  No Known Allergies    No current facility-administered medications on file prior to encounter.     Current Outpatient Medications on File Prior to Encounter   Medication Sig    ascorbic acid, vitamin C, (VITAMIN C) 500 MG tablet Take 1 tablet by mouth once daily.    blood sugar diagnostic (ACCU-CHEK AUDREY PLUS TEST STRP) Strp 1 strip by Misc.(Non-Drug; Combo Route) route once daily.    blood sugar diagnostic Strp To check BG 1 times daily, to use with insurance preferred meter    blood-glucose meter kit To check BG 1 times daily, to use with insurance preferred meter    canagliflozin (INVOKANA) 300 mg Tab tablet Take 1 tablet (300 mg total) by mouth once daily.    ezetimibe (ZETIA) 10 mg tablet Take 1 tablet (10 mg total) by mouth once daily.    folic acid-vit B6-vit B12 (FOLPLEX 2.2) 2.2-25-0.5 mg Tab Take 1 tablet by mouth once daily.    lancets Misc To check BG 1 times daily, to use with insurance preferred meter    metoprolol succinate (TOPROL-XL) 25 MG 24 hr tablet Take 1 tablet (25 mg total) by mouth once daily.    MULTIVITAMIN ORAL Take 1 tablet by mouth once daily.     omega 3-dha-epa-fish oil 900-1,400 mg CpDR Take 2 capsules by mouth 2 (two) times daily.     pioglitazone (ACTOS) 15 MG tablet Take 1 tablet (15 mg total) by  mouth once daily.    pravastatin (PRAVACHOL) 20 MG tablet Take 1 tablet (20 mg total) by mouth once daily. For cholesterol.    telmisartan (MICARDIS) 40 MG Tab Take 40 mg by mouth once daily.    vitamin D (VITAMIN D3) 1000 units Tab Take 1,000 Units by mouth once daily.    zinc acetate 50 mg (zinc) Cap Take by mouth.     Family History    None       Tobacco Use    Smoking status: Never    Smokeless tobacco: Never   Substance and Sexual Activity    Alcohol use: No    Drug use: No    Sexual activity: Not on file     Review of Systems   Eyes:  Positive for visual disturbance.   Gastrointestinal:  Positive for nausea.   Neurological:  Positive for dizziness and numbness (right upper lip).     Objective:     Vital Signs (Most Recent):  Temp: 97.5 °F (36.4 °C) (11/17/24 0833)  Pulse: (!) 56 (11/17/24 1108)  Resp: 20 (11/17/24 1108)  BP: (!) 170/81 (11/17/24 1103)  SpO2: 98 % (11/17/24 1108) Vital Signs (24h Range):  Temp:  [97.5 °F (36.4 °C)] 97.5 °F (36.4 °C)  Pulse:  [52-58] 56  Resp:  [10-22] 20  SpO2:  [94 %-99 %] 98 %  BP: (168-189)/(76-86) 170/81     Weight: 81.6 kg (180 lb)  Body mass index is 28.19 kg/m².     Physical Exam  Vitals reviewed.   Constitutional:       Appearance: Normal appearance.   HENT:      Head: Normocephalic and atraumatic.      Nose: No congestion.      Mouth/Throat:      Mouth: Mucous membranes are moist.      Pharynx: Oropharynx is clear.   Eyes:      Extraocular Movements: Extraocular movements intact.      Pupils: Pupils are equal, round, and reactive to light.   Cardiovascular:      Rate and Rhythm: Normal rate and regular rhythm.      Pulses: Normal pulses.      Heart sounds: Normal heart sounds.   Pulmonary:      Effort: Pulmonary effort is normal.      Breath sounds: Normal breath sounds.   Abdominal:      General: Bowel sounds are normal.      Palpations: Abdomen is soft.   Musculoskeletal:         General: Normal range of motion.      Cervical back: Normal range of motion and neck  supple.   Skin:     General: Skin is warm and dry.      Capillary Refill: Capillary refill takes less than 2 seconds.   Neurological:      General: No focal deficit present.      Mental Status: He is alert and oriented to person, place, and time. Mental status is at baseline.   Psychiatric:         Mood and Affect: Mood normal.         Behavior: Behavior normal.         Judgment: Judgment normal.              CRANIAL NERVES     CN III, IV, VI   Pupils are equal, round, and reactive to light.       Significant Labs: All pertinent labs within the past 24 hours have been reviewed.  Recent Lab Results         11/17/24  0853   11/17/24  0851   11/17/24  0842        Albumin 4.6                      ALT 20           Anion Gap 12           AST 20           Baso # 0.08           Basophil % 1.1           BILIRUBIN TOTAL 0.5  Comment: For infants and newborns, interpretation of results should be based  on gestational age, weight and in agreement with clinical  observations.    Premature Infant recommended reference ranges:  Up to 24 hours.............<8.0 mg/dL  Up to 48 hours............<12.0 mg/dL  3-5 days..................<15.0 mg/dL  6-29 days.................<15.0 mg/dL             BUN 20           Calcium 9.5           Chloride 106           Cholesterol Total 189  Comment: The National Cholesterol Education Program (NCEP) has set the  following guidelines (reference ranges) for Cholesterol:  Optimal.....................<200 mg/dL  Borderline High.............200-239 mg/dL  High........................> or = 240 mg/dL             CO2 22           Creatinine 1.6           Differential Method Automated           eGFR 43.0           Eos # 0.1           Eos % 0.8           Glucose 180           Gran # (ANC) 5.0           Gran % 67.1           HDL 45  Comment: The National Cholesterol Education Program (NCEP) has set the  following guidelines (reference values) for HDL Cholesterol:  Low...............<40  mg/dL  Optimal...........>60 mg/dL             HDL/Cholesterol Ratio 23.8           Hematocrit 47.5           Hemoglobin 15.9           Immature Grans (Abs) 0.07  Comment: Mild elevation in immature granulocytes is non specific and   can be seen in a variety of conditions including stress response,   acute inflammation, trauma and pregnancy. Correlation with other   laboratory and clinical findings is essential.             Immature Granulocytes 0.9           INR 1.0  Comment: Coumadin Therapy:  2.0 - 3.0 for INR for all indicators except mechanical heart valves  and antiphospholipid syndromes which should use 2.5 - 3.5.             LDL Cholesterol 78.6  Comment: The National Cholesterol Education Program (NCEP) has set the  following guidelines (reference values) for LDL Cholesterol:  Optimal.......................<130 mg/dL  Borderline High...............130-159 mg/dL  High..........................160-189 mg/dL  Very High.....................>190 mg/dL             Lipase 22           Lymph # 1.7           Lymph % 22.3           MCH 30.6           MCHC 33.5           MCV 91           Mono # 0.6           Mono % 7.8           MPV 10.3           Non-HDL Cholesterol 144  Comment: Risk category and Non-HDL cholesterol goals:  Coronary heart disease (CHD)or equivalent (10-year risk of CHD >20%):  Non-HDL cholesterol goal     <130 mg/dL  Two or more CHD risk factors and 10-year risk of CHD <= 20%:  Non-HDL cholesterol goal     <160 mg/dL  0 to 1 CHD risk factor:  Non-HDL cholesterol goal     <190 mg/dL             nRBC 0           Platelet Count 197           POC Creatinine   1.8         POCT Glucose     186       Potassium 4.0           PROTEIN TOTAL 7.5           PT 10.9           RBC 5.20           RDW 14.5           Sample   VENOUS         Sodium 140           Total Cholesterol/HDL Ratio 4.2           Triglycerides 327  Comment: The National Cholesterol Education Program (NCEP) has set the  following guidelines  (reference values) for triglycerides:  Normal......................<150 mg/dL  Borderline High.............150-199 mg/dL  High........................200-499 mg/dL             Troponin I High Sensitivity 5.8  Comment: Troponin results differ between methods. Do not use   results between Troponin methods interchangeably.    Alkaline Phospatase levels above 400 U/L may   cause false positive results.    Access hsTnI should not be used for patients taking   Asfotase emmie (Strensiq).             TSH 2.630           WBC 7.43                   Significant Imaging: I have reviewed all pertinent imaging results/findings within the past 24 hours.    MRA Brain without contrast  Narrative: EXAMINATION:  MRA BRAIN WITHOUT CONTRAST    CLINICAL HISTORY:  Diplopia;Dizziness, persistent/recurrent, cardiac or vascular cause suspected;    TECHNIQUE:  3D TOF MRA without contrast was performed, with maximum intensity projections reviewed.    COMPARISON:  None    FINDINGS:  The internal carotid and vertebrobasilar arteries are patent, without evidence of aneurysm, significant stenosis, or vascular malformation.  The left vertebral artery is dominant, noting tortuosity of intracranial V4 segment.  The right vertebral artery terminates as the right PICA, a normal variant.  The anterior, middle and posterior cerebral arteries are patent and demonstrate no aneurysm, significant stenosis or vascular malformation.  Impression: Negative for large vessel intracranial arterial occlusion.    Electronically signed by: Krish Centeno  Date:    11/17/2024  Time:    11:57  MRI Brain Without Contrast  Narrative: EXAMINATION:  MRI BRAIN WITHOUT CONTRAST    CLINICAL HISTORY:  Dizziness, persistent/recurrent, cardiac or vascular cause suspected;    TECHNIQUE:  Multisequence, multiplanar imaging through the brain performed without IV contrast.    COMPARISON:  CT head 11/17/2024    FINDINGS:  Diffusion-weighted imaging is negative for cerebral or cerebellar  ischemia or focal lesion.  Along the posterior aspect of the allan, to the left of midline, there is a 5 mm linear focus of hyperintensity on the diffusion-weighted sequence, which appears to show corresponding hypointensity on the ADC map.  There is equivocal small T2/FLAIR corresponding signal on routine image sequences and this could potentially represent a tiny acute ischemic infarction.    No intracranial hemorrhage.  No midline shift or mass effect.  Ventricles and sulci are normal in size for age.  Mild periventricular and deep white matter T2/FLAIR hyperintensity, most likely on the basis of microangiopathic change.  Cerebellar hemispheres are unremarkable.  Major intracranial T2 flow voids appear patent.    Mastoid air cells are clear.  Paranasal sinuses are clear.  No bone marrow signal abnormality.  Pituitary gland is within normal limits.  Impression: Equivocal small (5 mm) acute ischemic infarct involving the leftward aspect of the posterior allan.    Otherwise, negative for acute intracranial pathology.    Mild white matter microangiopathic changes.    Electronically signed by: Krish Centeno  Date:    11/17/2024  Time:    11:53  US Carotid Bilateral  Narrative: EXAMINATION:  US CAROTID BILATERAL    CLINICAL HISTORY:  diplopia, vertigo;    TECHNIQUE:  Grayscale, color and spectral Doppler analysis was performed.    CMS Mandated Quality [id number]    Criteria for stenosis is based upon the Society of Radiologist in Ultrasound Consensus Conference, 2003 (Radiology, November 2003, 229, [phone]). This is in accordance with NASCET criteria.    COMPARISON:  None    FINDINGS:  Right: Peak systolic velocity in the CCA is 67.7 cm/sec, and peak systolic velocity within the ICA 35.8 cm/sec, with ICA/CCA ratio of less than 2.  Maximum end diastolic velocities are within normal limits. ECA is patent.    Left: Peak systolic velocity in the CCA is 70.2 cm/sec, and peak systolic velocity within the .0  cm/sec, with ICA/CCA ratio of less than 2.  Maximum end diastolic velocities are within normal limits.  ECA is patent.    The vertebral arteries are patent, with normal waveforms, and normal antegrade flow bilaterally.  Impression: No Doppler findings of hemodynamically significant carotid arterial stenosis or vascular occlusion.    Patent vertebral arteries with antegrade flow bilaterally.    Electronically signed by: Krish Centeno  Date:    11/17/2024  Time:    10:15  CT HEAD FOR STROKE  Narrative: EXAMINATION:  CT HEAD FOR STROKE    CLINICAL HISTORY:  Neuro deficit, acute, stroke suspected;    TECHNIQUE:  Axial imaging obtained through the brain without IV contrast    CMS Mandated Quality Data-CT Radiation Dose-436    All CT scans at this facility dose modulation, iterative reconstruction, and or weight-based dosing when appropriate to reduce radiation dose to as low as reasonably achievable.    COMPARISON:  None    FINDINGS:  Negative for acute intracranial hemorrhage, midline shift, or mass effect.  Ventricles and sulci are normal in size.  Gray-white differentiation is maintained.  Cerebellar hemispheres and brainstem are unremarkable.  Atherosclerotic calcification of intracranial carotid arteries.    No calvarial lesion or fracture.  Mastoid air cells are clear.  Mucosal thickening involving frontal sinuses and left maxillary sinus.  Impression: No CT evidence of acute intracranial pathology.    Findings called to Dr. Craig at the time of dictation.    Electronically signed by: Krish Centeno  Date:    11/17/2024  Time:    08:57

## 2024-11-17 NOTE — CARE UPDATE
11/17/24 1613   Patient Assessment/Suction   Level of Consciousness (AVPU) alert   Respiratory Effort Normal;Unlabored   Expansion/Accessory Muscles/Retractions no use of accessory muscles   All Lung Fields Breath Sounds clear   Rhythm/Pattern, Respiratory unlabored   Cough Frequency infrequent   Cough Type dry   PRE-TX-O2   Device (Oxygen Therapy) room air   SpO2 95 %   Pulse Oximetry Type Continuous   $ Pulse Oximetry - Multiple Charge Pulse Oximetry - Multiple   Pulse 62   Resp 20   Tobacco Cessation Intervention   Do you use any type of tobacco product? No   Respiratory Evaluation   $ Care Plan Tech Time 15 min   $ Respiratory Evaluation Complete   Evaluation For New Orders   Admitting Diagnosis cva   Cardiac Diagnosis htn, afib,   Pulmonary Diagnosis na   Current Surgeries na   Home Oxygen   Has Home Oxygen? No   Home Aerosol, MDI, DPI, and Other Treatments/Therapies   Home Respiratory Therapy Per Patient/Review of Chart No   Oxygen Care Plan   Rationale No rational found   Bronchodilator Care Plan   Rationale No Rationale found   Atelectasis Care Plan   Rationale No Rational Found   Airway Clearance Care Plan   Rationale No rationale found

## 2024-11-17 NOTE — HPI
78 y/o male with pMHx of CEA, PAF, T2DM, HTN, and HLD who presented to the ED via POV accompanied by wife with reports of dizziness and and epigastric queasiness.  Patient reports that he noticed he was dizzy when turning his head or moving a certain way at approximately 10:00 a.m. Saturday morning.  Patient then began with intermittent diplopia.  Then patient noticed that he was having then mid epigastric nausea/queasiness after he took his a.m. medications.  Patient states that he was previously on aspirin but was recently taken off by his cardiologist.  On arrival to the ED pulse 52, /86, temp 97.5°, SpO2 99% on room air.  Blood work performed in the ED with CO2 22, glucose 180, creatinine 1.6, GFR 43, triglycerides 327.  CTA head without contrast with no evidence of acute intracranial pathology.  Additional testing of carotid ultrasound bilaterally with no Doppler findings of hemodynamically significant carotid arterial stenosis or vascular occlusion.  MRI without contrast with equivocal small (5 mm) acute ischemic infarct involving the leftward aspect of the posterior allan.  Otherwise negative for acute intracranial pathology.  Mild white matter microangiopathic changes.  MRI brain negative for LVO.  Decision made to admit patient for further evaluation.

## 2024-11-17 NOTE — CONSULTS
"On license of [facility]  Department of Neurology  Neurology Consultation Note        PATIENT NAME: Ruy Ramos Jr.  MRN: [id number]  CSN: [id number]      TODAY'S DATE: 11/17/2024  ADMIT DATE: 11/17/2024                            CONSULTING PROVIDER: Elsa Brito NP  CONSULT REQUESTED BY: Jonathan Mena MD      Reason for consult:  His in this and intermittent diplopia      History obtained from chart review, the patient, and ED MD.     HPI per EMR: Ruy Ramos Jr. is a 79 y.o. male who presents to the emergency department complaining of dizziness that began around 10:00 a.m. yesterday.  Dizziness is described as vertigo with associated double vision that is intermittent.  Patient had associated nausea and a " queasiness" in the epigastric region this morning after taking his medications.  He has a past medical history of type 2 diabetes hypertension, hyperlipidemia.  Denies any associated weakness, no trouble speaking.  Denies abdominal pain, chest pain, shortness breath.     Neurology consult:  79-year-old male with pMHx of T2 DM, HTN, HLD who presented to the ED with reports of dizziness that began at 10:00 a.m. Saturday morning.  The patient states that he has had 1 episode of nausea after taking his home medications.  He also is with reports of right upper lip numbness and tingling and intermittent diplopia    PREVIOUS MEDICAL HISTORY:  Past Medical History:   Diagnosis Date    Diabetes mellitus, type 2     DM type 2 without retinopathy     Hypertension     Kidney stone      PREVIOUS SURGICAL HISTORY:  Past Surgical History:   Procedure Laterality Date    APPENDECTOMY  5/30/2020    Procedure: APPENDECTOMY;  Surgeon: Eusebio Gil III, MD;  Location: [facility] OR;  Service: General;;    arm fracture surgery      FRACTURE SURGERY      Left arm    LAPAROSCOPIC APPENDECTOMY N/A 5/30/2020    Procedure: APPENDECTOMY, LAPAROSCOPIC VS OPEN;  Surgeon: Eusebio Gil III, MD;  Location: [facility] " OR;  Service: General;  Laterality: N/A;    Open Appendectomy w/ partial cecectomy   05/31/2020    Dr. Gil      FAMILY MEDICAL HISTORY:  No family history on file.  ALLERGIES:  Review of patient's allergies indicates:  No Known Allergies  HOME MEDICATIONS:  Prior to Admission medications    Medication Sig Start Date End Date Taking? Authorizing Provider   ascorbic acid, vitamin C, (VITAMIN C) 500 MG tablet Take 1 tablet by mouth once daily.    Provider, Historical   blood sugar diagnostic (ACCU-CHEK AUDREY PLUS TEST STRP) Strp 1 strip by Misc.(Non-Drug; Combo Route) route once daily. 2/7/24   Rigoberto Bunch MD   blood sugar diagnostic Strp To check BG 1 times daily, to use with insurance preferred meter 7/30/24   Rigoberto Bunch MD   blood-glucose meter kit To check BG 1 times daily, to use with insurance preferred meter 7/30/24 7/30/25  Rigoberto Bunch MD   canagliflozin (INVOKANA) 300 mg Tab tablet Take 1 tablet (300 mg total) by mouth once daily. 3/12/24   Rigoberto Bunch MD   ezetimibe (ZETIA) 10 mg tablet Take 1 tablet (10 mg total) by mouth once daily. 7/30/24   Rigoberto Bunch MD   folic acid-vit B6-vit B12 (FOLPLEX 2.2) 2.2-25-0.5 mg Tab Take 1 tablet by mouth once daily. 3/12/24   Rigoberto Bunch MD   lancets [facility] To check BG 1 times daily, to use with insurance preferred meter 7/30/24   Rigoberto Bunch MD   metoprolol succinate (TOPROL-XL) 25 MG 24 hr tablet Take 1 tablet (25 mg total) by mouth once daily. 7/30/24   Rigoberto Bunch MD   MULTIVITAMIN ORAL Take 1 tablet by mouth once daily.     Provider, Historical   omega 3-dha-epa-fish oil 900-1,400 mg CpDR Take 2 capsules by mouth 2 (two) times daily.     Provider, Historical   pioglitazone (ACTOS) 15 MG tablet Take 1 tablet (15 mg total) by mouth once daily. 7/30/24   Rigoberto Bunch MD   pravastatin (PRAVACHOL) 20 MG tablet Take 1 tablet (20 mg total) by mouth once daily. For cholesterol. 7/30/24 1/26/25  Rigoberto Bunch  "MD [place]   telmisartan (MICARDIS) 40 MG Tab Take 40 mg by mouth once daily.    Provider, Historical   vitamin D (VITAMIN D3) 1000 units Tab Take 1,000 Units by mouth once daily.    Provider, Historical   zinc acetate 50 mg (zinc) Cap Take by mouth.    Provider, Historical     CURRENT SCHEDULED MEDICATIONS:   meclizine  50 mg Oral ED 1 Time    ondansetron  4 mg Intravenous ED 1 Time     CURRENT INFUSIONS:    CURRENT PRN MEDICATIONS:      REVIEW OF SYSTEMS:  Please refer to the HPI for all pertinent positive and negative findings. A comprehensive review of all other systems was negative.    PHYSICAL EXAM:  Patient Vitals for the past 24 hrs:   BP Temp Temp src Pulse Resp SpO2 Height Weight   11/17/24 0908 -- -- -- (!) 52 10 98 % -- --   11/17/24 0833 (!) 184/86 97.5 °F (36.4 °C) Oral (!) 52 16 99 % 5' 7" (1.702 m) 81.6 kg (180 lb)       GENERAL APPEARANCE: Alert, well-developed, well-nourished male in no acute distress.  HEENT: Normocephalic and atraumatic. PERRL. Oropharynx unremarkable.  PULM: Normal respiratory effort. No accessory muscle use.  CV: RRR.  ABDOMEN: Soft, nontender.  EXTREMITIES: No obvious signs of vascular compromise. Pulses present. No cyanosis, clubbing or edema.  SKIN: Clear; no rashes, lesions or skin breaks in exposed areas.    NEURO:  MENTAL STATUS: Patient awake and oriented to time, place, and person, recent/remote memory normal, attention span/concentration normal, speech fluent without paraphasic errors, good comprehension with appropriate thought content, and fund of knowledge appropriate for patient's level of education.  Affect euthymic.    CRANIAL NERVES:  CN I: Not tested.  CN II: Fundoscopic exam deferred.  CN III, IV, VI: Pupils equal, round and reactive to light.  Extraocular movements full and intact.  CN V: Facial sensation normal.  CN VII: Facial asymmetry absent.  CN VIII: Hearing grossly normal and equal bilaterally.  No skew deviation or pathologic nystagmus.  CN IX, X: Palate " elevates symmetrically. Speech/articulation is clear without dysarthria.  CN XI: Shoulder shrug and chin rotation equal with good strength.  CN XII: Tongue protrusion midline.    MOTOR:  Bulk normal. Tone normal and symmetric throughout.  Abnormal movements absent.  Tremor: none present.  Strength 5/5 throughout except/unless specified below:.    REFLEXES:  DTRs 2+ throughout.  Plantar response not tested.  SENSATION: grossly intact throughout.  COORDINATION: normal finger-to-nose.  STATION: not tested.  GAIT: not tested.      NIHSS:  1a      Level of Consciousness (alert, drowsy, etc.):   0=alert; keenly responsive  1b.     Level of Consciousness Questions (month, age): Is able to answer both questions  1c.     Level of Consciousness Commands (open, close eyes, make fist, let go):  0=Answers both tasks correctly  2.      Best Gaze (eyes open - patient follows examiner's finger or face):      0=normal  3.      Visual (introduce visual stimulus/threat to patient's visual field quadrants):  0=No visual loss  4.      Facial Palsy (show teeth, raise eyebrows and squeeze eyes shut):        0=Normal symmetric movement  5a.     Motor Arm - Left (elevate extremity 90 degrees and score drift/movement):       0=No drift, limb holds 90 (or 45) degrees for full 10 seconds  5b.     Motor Arm - Right (elevate extremity 90 degrees and score drift/movement):      0=No drift, limb holds 90 (or 45) degrees for full 10 seconds  6a.     Motor Leg - Left (elevate extremity 30 degrees and score drift/movement):       0=No drift, limb holds 90 (or 45) degrees for full 10 seconds  6b      Motor Leg - Right (elevate extremity 30 degrees and score drift/movement):      0=No drift, limb holds 90 (or 45) degrees for full 10 seconds  7.      Limb Ataxia (finger-nose, heel down shin):      0=Absent  8.      Sensory (pin prick to face, arm, trunk, and leg - compare side to side):        0=Normal; no sensory loss  9.      Best Language (name  "items, describe a picture and read sentences):      0=No aphasia, normal  10.     Dysarthria (evaluate speech clarity by patient repeating listed words): 0=Normal  11.     Extinction and Inattention (use prior testing to identify neglect or double simultaneous stimuli testing):      0=No abnormality          NIH Stroke Scale Total:         0      Labs:  Recent Labs   Lab 11/17/24  0853      K 4.0      CO2 22*   BUN 20   CREATININE 1.6*   *   CALCIUM 9.5     Recent Labs   Lab 11/17/24  0853   WBC 7.43   HGB 15.9   HCT 47.5        Recent Labs   Lab 11/17/24  0853   ALBUMIN 4.6   PROT 7.5   BILITOT 0.5   ALKPHOS 105   ALT 20   AST 20     Lab Results   Component Value Date    INR 1.0 11/17/2024     Lab Results   Component Value Date    TRIG 327 (H) 11/17/2024    HDL 45 11/17/2024    CHOLHDL 23.8 11/17/2024     Lab Results   Component Value Date    HGBA1C 7.2 (H) 06/20/2024     No results found for: "PROTEINCSF", "GLUCCSF", "WBCCSF"    Imaging:  I have reviewed and interpreted the pertinent imaging and lab results.      CT HEAD FOR STROKE  Narrative: EXAMINATION:  CT HEAD FOR STROKE    CLINICAL HISTORY:  Neuro deficit, acute, stroke suspected;    TECHNIQUE:  Axial imaging obtained through the brain without IV contrast    CMS Mandated Quality Data-CT Radiation Dose-436    All CT scans at this facility dose modulation, iterative reconstruction, and or weight-based dosing when appropriate to reduce radiation dose to as low as reasonably achievable.    COMPARISON:  None    FINDINGS:  Negative for acute intracranial hemorrhage, midline shift, or mass effect.  Ventricles and sulci are normal in size.  Gray-white differentiation is maintained.  Cerebellar hemispheres and brainstem are unremarkable.  Atherosclerotic calcification of intracranial carotid arteries.    No calvarial lesion or fracture.  Mastoid air cells are clear.  Mucosal thickening involving frontal sinuses and left maxillary " sinus.  Impression: No CT evidence of acute intracranial pathology.    Findings called to Dr. Craig at the time of dictation.    Electronically signed by: Krish Centeno  Date:    11/17/2024  Time:    08:57         ASSESSMENT & PLAN:    Problem   Dizziness   Transient Diplopia   Facial Paresthesia     Neuro  Facial paresthesia  Assessment & Plan  Numbness to right upper lip    Ophtho  Transient diplopia  Assessment & Plan  Intermittent  Outpatient Ophthalmology    Other  * Dizziness  Assessment & Plan  CT head - negative for acute intracranial abnormality  MRI brain ordered  MRA brain ordered  Carotid US ordered        Follow up Neurology in 2 weeks. Call [phone] to make an appointment.    Medication side effects discussed with the patient and/or caregiver.        Thank you kindly for including us in the care of this patient. Please do not hesitate to contact us with any questions.        Elsa Brito NP  Neurology/vascular Neurology  Date of Service: 11/17/2024  10:11 AM    --------------------------------------------------------------------------------------------------------------------------------------------------------------------------------------------------------------------------------------------------------------  Please note: This note was transcribed using voice recognition software. Because of this technology there are often uinintended grammatical, spelling, and other transcription errors. Please disregard these errors.     I, Dr. Jose Lynch, have personally seen and examined the patient with my advanced provider and agree with above. I personally did a focused exam, and reviewed all necessary clinical information. I discussed my management plan with my NP and agree with above.   All side effects of medications were discussed with the patient and/or next of kin including severe mood changes, organ failure, lab abnormalities, rash, passing out, low blood pressure, glaucoma, cognitive changes,  life threatening bleeding, passing out, glaucoma, rash, fatigue, weight gain and or weight loss, and death.  No driving until follow up at [facility].  Follow up at [facility] 3 days post discharge. Telehealth available.   Stroke (Including any acute neurological symptoms to return to the ER immediately and call 911, like acute weakness, severe headaches, sensory, visual or speech changes)  and seizure education provided.  ?Pontine infarct, still has dizziness, and decreased sensation in the upper lip (for >24 hrs now). Called acutely for stroke care. CC 45 mins.    Severiano Lynch MD. FAAN.    [facility]  [phone]

## 2024-11-17 NOTE — ASSESSMENT & PLAN NOTE
CT head - negative for acute intracranial abnormality  MRI brain ordered  MRA brain ordered  Carotid US ordered

## 2024-11-17 NOTE — ASSESSMENT & PLAN NOTE
Patients blood pressure range in the last 24 hours was: BP  Min: 168/77  Max: 189/83.The patient's inpatient anti-hypertensive regimen is listed below:  Current Antihypertensives  labetaloL injection 10 mg, Every 15 min PRN, Intravenous    Plan  - PERMISSIVE HYPERTENSIVE FOR 24-48 HOURS POST EVENT  - resume BP medications when permissive hypertension timeframe complete

## 2024-11-17 NOTE — ASSESSMENT & PLAN NOTE
Patient's FSGs are controlled on current medication regimen.  Last A1c reviewed-   Lab Results   Component Value Date    HGBA1C 7.2 (H) 06/20/2024     Most recent fingerstick glucose reviewed-   Recent Labs   Lab 11/17/24  0842   POCTGLUCOSE 186*     Current correctional scale  Medium  Maintain anti-hyperglycemic dose as follows-   Antihyperglycemics (From admission, onward)      Start     Stop Route Frequency Ordered    11/17/24 1428  insulin aspart U-100 pen 0-10 Units         -- SubQ Before meals & nightly PRN 11/17/24 1328          Hold Oral hypoglycemics while patient is in the hospital.    Creatine stable for now. BMP reviewed- noted Estimated Creatinine Clearance: 37 mL/min (A) (based on SCr of 1.6 mg/dL (H)). according to latest data. Based on current GFR, CKD stage is stage 3 - GFR 30-59.  Monitor UOP and serial BMP and adjust therapy as needed. Renally dose meds. Avoid nephrotoxic medications and procedures.

## 2024-11-17 NOTE — ED PROVIDER NOTES
"Encounter Date: 11/17/2024       History     Chief Complaint   Patient presents with    Dizziness     Onset yesterday 10 am, getting worse. Sudden movts make it worse    CHEST DISCOMFORT     EPIGASTRIC AREA, "FEELS FUNNY"     HPI patient is a 79-year-old man who presents to the emergency department complaining of dizziness that began around 10:00 a.m. yesterday.  Dizziness is described as vertigo with associated double vision that is intermittent.  Patient had associated nausea and a " queasiness" in the epigastric region this morning after taking his medications.  He has a past medical history of type 2 diabetes hypertension, hyperlipidemia.  Denies any associated weakness, no trouble speaking.  Denies abdominal pain, chest pain, shortness breath.  Review of patient's allergies indicates:  No Known Allergies  Past Medical History:   Diagnosis Date    Diabetes mellitus, type 2     DM type 2 without retinopathy     Hypertension     Kidney stone      Past Surgical History:   Procedure Laterality Date    APPENDECTOMY  5/30/2020    Procedure: APPENDECTOMY;  Surgeon: Eusebio Gil III, MD;  Location: [facility] OR;  Service: General;;    arm fracture surgery      FRACTURE SURGERY      Left arm    LAPAROSCOPIC APPENDECTOMY N/A 5/30/2020    Procedure: APPENDECTOMY, LAPAROSCOPIC VS OPEN;  Surgeon: Eusebio Gil III, MD;  Location: [facility];  Service: General;  Laterality: N/A;    Open Appendectomy w/ partial cecectomy   05/31/2020    Dr. Gil      No family history on file.  Social History     Tobacco Use    Smoking status: Never    Smokeless tobacco: Never   Substance Use Topics    Alcohol use: No    Drug use: No     Review of Systems   Constitutional:  Negative for fever.   HENT:  Negative for sore throat.    Eyes:  Positive for visual disturbance.   Respiratory:  Negative for shortness of breath.    Cardiovascular:  Negative for chest pain.   Gastrointestinal:  Positive for nausea.   Genitourinary:  Negative for " dysuria.   Musculoskeletal:  Negative for back pain.   Skin:  Negative for rash.   Neurological:  Positive for dizziness. Negative for weakness.   Hematological:  Does not bruise/bleed easily.       Physical Exam     Initial Vitals [11/17/24 0833]   BP Pulse Resp Temp SpO2   (!) 184/86 (!) 52 16 97.5 °F (36.4 °C) 99 %      MAP       --         Physical Exam    Constitutional: Vital signs are normal. He appears well-developed and well-nourished.  Non-toxic appearance. No distress.   HENT:   Head: Normocephalic and atraumatic.   Eyes: EOM are normal. Pupils are equal, round, and reactive to light.   Neck: Neck supple. No JVD present.   Normal range of motion.  Cardiovascular:  Normal rate, regular rhythm, normal heart sounds and intact distal pulses.     Exam reveals no gallop and no friction rub.       No murmur heard.  Pulmonary/Chest: Breath sounds normal. He has no wheezes. He has no rhonchi. He has no rales.   Abdominal: Abdomen is soft. Bowel sounds are normal. There is no abdominal tenderness. There is no rebound and no guarding.   Musculoskeletal:         General: Normal range of motion.      Cervical back: Normal range of motion and neck supple. No rigidity.     Neurological: He is alert and oriented to person, place, and time. He has normal strength and normal reflexes. No cranial nerve deficit or sensory deficit. He exhibits normal muscle tone. Coordination normal. GCS eye subscore is 4. GCS verbal subscore is 5. GCS motor subscore is 6.   No dysmetria on finger-to-nose or heel-to-shin.  Negative Romberg.  No visual field cuts.  Positive Buckner-Hallpike with left ear down.   Skin: Skin is warm and dry.   Psychiatric: He has a normal mood and affect. His speech is normal and behavior is normal. He is not actively hallucinating.         ED Course   Procedures  Labs Reviewed   CBC W/ AUTO DIFFERENTIAL - Abnormal       Result Value    WBC 7.43      RBC 5.20      Hemoglobin 15.9      Hematocrit 47.5      MCV 91       MCH 30.6      MCHC 33.5      RDW 14.5      Platelets 197      MPV 10.3      Immature Granulocytes 0.9 (*)     Gran # (ANC) 5.0      Immature Grans (Abs) 0.07 (*)     Lymph # 1.7      Mono # 0.6      Eos # 0.1      Baso # 0.08      nRBC 0      Gran % 67.1      Lymph % 22.3      Mono % 7.8      Eosinophil % 0.8      Basophil % 1.1      Differential Method Automated     COMPREHENSIVE METABOLIC PANEL - Abnormal    Sodium 140      Potassium 4.0      Chloride 106      CO2 22 (*)     Glucose 180 (*)     BUN 20      Creatinine 1.6 (*)     Calcium 9.5      Total Protein 7.5      Albumin 4.6      Total Bilirubin 0.5      Alkaline Phosphatase 105      AST 20      ALT 20      eGFR 43.0 (*)     Anion Gap 12     LIPID PANEL - Abnormal    Cholesterol 189      Triglycerides 327 (*)     HDL 45      LDL Cholesterol 78.6      HDL/Cholesterol Ratio 23.8      Total Cholesterol/HDL Ratio 4.2      Non-HDL Cholesterol 144     POCT GLUCOSE - Abnormal    POCT Glucose 186 (*)    ISTAT CREATININE - Abnormal    POC Creatinine 1.8 (*)     Sample VENOUS     PROTIME-INR    Prothrombin Time 10.9      INR 1.0     TSH    TSH 2.630     LIPASE    Lipase 22     TROPONIN I HIGH SENSITIVITY    Troponin I High Sensitivity 5.8     POCT GLUCOSE MONITORING CONTINUOUS   POCT CREATININE   POCT GLUCOSE MONITORING CONTINUOUS        ECG Results              ECG 12 lead (In process)        Collection Time Result Time QRS Duration [facility]HS QTC Calculation    11/17/24 08:35:44 11/17/24 09:11:48 82 446                     In process by Interface, Lab In [facility] (11/17/24 09:11:55)                   Narrative:    Test Reason : R29.818,    Vent. Rate :  53 BPM     Atrial Rate :  53 BPM     P-R Int : 200 ms          QRS Dur :  82 ms      QT Int : 476 ms       P-R-T Axes :  58  61  64 degrees    QTcB Int : 446 ms    Sinus bradycardia  Cannot rule out Anterior infarct ,age undetermined  Abnormal ECG  When compared with ECG of 02-Jun-2020 02:28,  Sinus rhythm has replaced  Atrial fibrillation  Vent. rate has decreased by  77 bpm  ST elevation now present in Inferior leads  ST no longer depressed in Anterior-lateral leads  T wave inversion no longer evident in Inferior leads  T wave inversion no longer evident in Anterior-lateral leads    Referred By: AAAREFERRAL SELF           Confirmed By:                                   Imaging Results              MRI Brain Without Contrast (Final result)  Result time 11/17/24 11:53:42      Final result by Krish Centeno MD (11/17/24 11:53:42)                   Impression:      Equivocal small (5 mm) acute ischemic infarct involving the leftward aspect of the posterior allan.    Otherwise, negative for acute intracranial pathology.    Mild white matter microangiopathic changes.      Electronically signed by: Krish Centeno  Date:    11/17/2024  Time:    11:53               Narrative:    EXAMINATION:  MRI BRAIN WITHOUT CONTRAST    CLINICAL HISTORY:  Dizziness, persistent/recurrent, cardiac or vascular cause suspected;    TECHNIQUE:  Multisequence, multiplanar imaging through the brain performed without IV contrast.    COMPARISON:  CT head 11/17/2024    FINDINGS:  Diffusion-weighted imaging is negative for cerebral or cerebellar ischemia or focal lesion.  Along the posterior aspect of the allan, to the left of midline, there is a 5 mm linear focus of hyperintensity on the diffusion-weighted sequence, which appears to show corresponding hypointensity on the ADC map.  There is equivocal small T2/FLAIR corresponding signal on routine image sequences and this could potentially represent a tiny acute ischemic infarction.    No intracranial hemorrhage.  No midline shift or mass effect.  Ventricles and sulci are normal in size for age.  Mild periventricular and deep white matter T2/FLAIR hyperintensity, most likely on the basis of microangiopathic change.  Cerebellar hemispheres are unremarkable.  Major intracranial T2 flow voids appear  patent.    Mastoid air cells are clear.  Paranasal sinuses are clear.  No bone marrow signal abnormality.  Pituitary gland is within normal limits.                                       MRA Brain without contrast (Final result)  Result time 11/17/24 11:57:13      Final result by Krish Centeno MD (11/17/24 11:57:13)                   Impression:      Negative for large vessel intracranial arterial occlusion.      Electronically signed by: Krish Centeno  Date:    11/17/2024  Time:    11:57               Narrative:    EXAMINATION:  MRA BRAIN WITHOUT CONTRAST    CLINICAL HISTORY:  Diplopia;Dizziness, persistent/recurrent, cardiac or vascular cause suspected;    TECHNIQUE:  3D TOF MRA without contrast was performed, with maximum intensity projections reviewed.    COMPARISON:  None    FINDINGS:  The internal carotid and vertebrobasilar arteries are patent, without evidence of aneurysm, significant stenosis, or vascular malformation.  The left vertebral artery is dominant, noting tortuosity of intracranial V4 segment.  The right vertebral artery terminates as the right PICA, a normal variant.  The anterior, middle and posterior cerebral arteries are patent and demonstrate no aneurysm, significant stenosis or vascular malformation.                                       US Carotid Bilateral (Final result)  Result time 11/17/24 10:15:07      Final result by Krish Centeno MD (11/17/24 10:15:07)                   Impression:      No Doppler findings of hemodynamically significant carotid arterial stenosis or vascular occlusion.    Patent vertebral arteries with antegrade flow bilaterally.      Electronically signed by: Krish Centeno  Date:    11/17/2024  Time:    10:15               Narrative:    EXAMINATION:  US CAROTID BILATERAL    CLINICAL HISTORY:  diplopia, vertigo;    TECHNIQUE:  Grayscale, color and spectral Doppler analysis was performed.    CMS Mandated Quality [id number]    Criteria for stenosis is  based upon the Society of Radiologist in Ultrasound Consensus Conference, 2003 (Radiology, November 2003, 229, 340-346). This is in accordance with NASCET criteria.    COMPARISON:  None    FINDINGS:  Right: Peak systolic velocity in the CCA is 67.7 cm/sec, and peak systolic velocity within the ICA 35.8 cm/sec, with ICA/CCA ratio of less than 2.  Maximum end diastolic velocities are within normal limits. ECA is patent.    Left: Peak systolic velocity in the CCA is 70.2 cm/sec, and peak systolic velocity within the .0 cm/sec, with ICA/CCA ratio of less than 2.  Maximum end diastolic velocities are within normal limits.  ECA is patent.    The vertebral arteries are patent, with normal waveforms, and normal antegrade flow bilaterally.                                       CT HEAD FOR STROKE (Final result)  Result time 11/17/24 08:57:56      Final result by Krish Centeno MD (11/17/24 08:57:56)                   Impression:      No CT evidence of acute intracranial pathology.    Findings called to Dr. Craig at the time of dictation.      Electronically signed by: Krish Centeno  Date:    11/17/2024  Time:    08:57               Narrative:    EXAMINATION:  CT HEAD FOR STROKE    CLINICAL HISTORY:  Neuro deficit, acute, stroke suspected;    TECHNIQUE:  Axial imaging obtained through the brain without IV contrast    CMS Mandated Quality Data-CT Radiation Dose-436    All CT scans at this facility dose modulation, iterative reconstruction, and or weight-based dosing when appropriate to reduce radiation dose to as low as reasonably achievable.    COMPARISON:  None    FINDINGS:  Negative for acute intracranial hemorrhage, midline shift, or mass effect.  Ventricles and sulci are normal in size.  Gray-white differentiation is maintained.  Cerebellar hemispheres and brainstem are unremarkable.  Atherosclerotic calcification of intracranial carotid arteries.    No calvarial lesion or fracture.  Mastoid air cells are  clear.  Mucosal thickening involving frontal sinuses and left maxillary sinus.                                       Medications   ondansetron injection 4 mg (0 mg Intravenous Hold 11/17/24 0900)   sodium chloride 0.9% flush 10 mL (has no administration in time range)   sodium chloride 0.9% bolus 500 mL 500 mL (has no administration in time range)   labetaloL injection 10 mg (has no administration in time range)   bisacodyL suppository 10 mg (has no administration in time range)   enoxaparin injection 40 mg (40 mg Subcutaneous Given 11/17/24 1512)   aspirin EC tablet 325 mg (has no administration in time range)   atorvastatin tablet 40 mg (has no administration in time range)   ondansetron injection 4 mg (has no administration in time range)   promethazine tablet 25 mg (has no administration in time range)   ezetimibe tablet 10 mg (has no administration in time range)   folic acid-vit B6-vit B12 2.5-25-1 mg Tab (has no administration in time range)   multivitamin tablet 1 tablet (has no administration in time range)   vitamin D 1000 units tablet 1,000 Units (has no administration in time range)   meclizine tablet 25 mg (has no administration in time range)   glucose chewable tablet 16 g (has no administration in time range)   glucose chewable tablet 24 g (has no administration in time range)   dextrose 50% injection 12.5 g (has no administration in time range)   dextrose 50% injection 25 g (has no administration in time range)   glucagon (human recombinant) injection 1 mg (has no administration in time range)   insulin aspart U-100 pen 0-10 Units (has no administration in time range)   meclizine tablet 50 mg (50 mg Oral Given 11/17/24 1052)   aspirin chewable tablet 81 mg (81 mg Oral Given 11/17/24 1512)     Medical Decision Making  79-year-old man who presents to the emergency department complaining of dizziness that began around 10:00 a.m. yesterday.  Dizziness is described as vertigo with associated double vision  "that is intermittent.  Patient had associated nausea and a " queasiness" in the epigastric region this morning after taking his medications.  He has a past medical history of type 2 diabetes hypertension, hyperlipidemia.  Denies any associated weakness, no trouble speaking.  Denies abdominal pain, chest pain, shortness breath.  Stroke workup initiated.  Suspect peripheral vertigo more likely than central vertigo given that the symptoms are intermittent and worsened with position changes with a positive Tanner-Hallpike on exam however patient does complain of diplopia and of numbness on his right upper lip.  CT head showed no evidence of acute abnormalities, MRI brain and MRA was ordered and he did have an equivocal small acute ischemic infarct involving the left aspect of the allan.  Discussed with neurology recommends starting aspirin.  Van negative.    Thrombolysis Candidate? No, Out of window - Symptom onset > 4.5 hours    Delays to Thrombolysis?  Not Applicable    Discussed Hospital Medicine will admit the patient for further stroke workup.      Amount and/or Complexity of Data Reviewed  Labs: ordered.  Radiology: ordered.    Risk  OTC drugs.  Prescription drug management.  Decision regarding hospitalization.    Critical Care  Total time providing critical care: 50 minutes                                      Clinical Impression:  Final diagnoses:  [R29.818] Acute focal neurological deficit  [I63.9, R29.818] Neurologic deficit due to acute ischemic cerebrovascular accident (CVA) (Primary)          ED Disposition Condition    Admit Stable                Jonathan Mena MD  11/17/24 1805       Jonathan Mena MD  11/17/24 1805    "

## 2024-11-17 NOTE — H&P
"  [facility] - Emergency Dept  Hospital Medicine  History & Physical    Patient Name: Ruy Ramos Jr.  MRN: [id number]  Patient Class: IP- Inpatient  Admission Date: 11/17/2024  Attending Physician: Raheem Stanton MD   Primary Care Provider: Rigoberto Bunch MD         Patient information was obtained from patient, spouse/SO, and ER records.     Subjective:     Principal Problem:Neurologic deficit due to acute ischemic stroke    Chief Complaint:   Chief Complaint   Patient presents with    Dizziness     Onset yesterday 10 am, getting worse. Sudden movts make it worse    CHEST DISCOMFORT     EPIGASTRIC AREA, "FEELS FUNNY"        HPI: 80 y/o male with pMHx of CEA, PAF, T2DM, HTN, and HLD who presented to the ED via POV accompanied by wife with reports of dizziness and and epigastric queasiness.  Patient reports that he noticed he was dizzy when turning his head or moving a certain way at approximately 10:00 a.m. Saturday morning.  Patient then began with intermittent diplopia.  Then patient noticed that he was having then mid epigastric nausea/queasiness after he took his a.m. medications.  Patient states that he was previously on aspirin but was recently taken off by his cardiologist.  On arrival to the ED pulse 52, /86, temp 97.5°, SpO2 99% on room air.  Blood work performed in the ED with CO2 22, glucose 180, creatinine 1.6, GFR 43, triglycerides 327.  CTA head without contrast with no evidence of acute intracranial pathology.  Additional testing of carotid ultrasound bilaterally with no Doppler findings of hemodynamically significant carotid arterial stenosis or vascular occlusion.  MRI without contrast with equivocal small (5 mm) acute ischemic infarct involving the leftward aspect of the posterior allan.  Otherwise negative for acute intracranial pathology.  Mild white matter microangiopathic changes.  MRI brain negative for LVO.  Decision made to admit patient for further evaluation.   "     Past Medical History:   Diagnosis Date    Diabetes mellitus, type 2     DM type 2 without retinopathy     Hypertension     Kidney stone        Past Surgical History:   Procedure Laterality Date    APPENDECTOMY  5/30/2020    Procedure: APPENDECTOMY;  Surgeon: Eusebio Gil III, MD;  Location: [facility] OR;  Service: General;;    arm fracture surgery      FRACTURE SURGERY      Left arm    LAPAROSCOPIC APPENDECTOMY N/A 5/30/2020    Procedure: APPENDECTOMY, LAPAROSCOPIC VS OPEN;  Surgeon: Eusebio Gil III, MD;  Location: [facility];  Service: General;  Laterality: N/A;    Open Appendectomy w/ partial cecectomy   05/31/2020    Dr. iGl        Review of patient's allergies indicates:  No Known Allergies    No current facility-administered medications on file prior to encounter.     Current Outpatient Medications on File Prior to Encounter   Medication Sig    ascorbic acid, vitamin C, (VITAMIN C) 500 MG tablet Take 1 tablet by mouth once daily.    blood sugar diagnostic (ACCU-CHEK AUDREY PLUS TEST STRP) Strp 1 strip by Misc.(Non-Drug; Combo Route) route once daily.    blood sugar diagnostic Strp To check BG 1 times daily, to use with insurance preferred meter    blood-glucose meter kit To check BG 1 times daily, to use with insurance preferred meter    canagliflozin (INVOKANA) 300 mg Tab tablet Take 1 tablet (300 mg total) by mouth once daily.    ezetimibe (ZETIA) 10 mg tablet Take 1 tablet (10 mg total) by mouth once daily.    folic acid-vit B6-vit B12 (FOLPLEX 2.2) 2.2-25-0.5 mg Tab Take 1 tablet by mouth once daily.    lancets Misc To check BG 1 times daily, to use with insurance preferred meter    metoprolol succinate (TOPROL-XL) 25 MG 24 hr tablet Take 1 tablet (25 mg total) by mouth once daily.    MULTIVITAMIN ORAL Take 1 tablet by mouth once daily.     omega 3-dha-epa-fish oil 900-1,400 mg CpDR Take 2 capsules by mouth 2 (two) times daily.     pioglitazone (ACTOS) 15 MG tablet Take 1 tablet (15 mg total) by  mouth once daily.    pravastatin (PRAVACHOL) 20 MG tablet Take 1 tablet (20 mg total) by mouth once daily. For cholesterol.    telmisartan (MICARDIS) 40 MG Tab Take 40 mg by mouth once daily.    vitamin D (VITAMIN D3) 1000 units Tab Take 1,000 Units by mouth once daily.    zinc acetate 50 mg (zinc) Cap Take by mouth.     Family History    None       Tobacco Use    Smoking status: Never    Smokeless tobacco: Never   Substance and Sexual Activity    Alcohol use: No    Drug use: No    Sexual activity: Not on file     Review of Systems   Eyes:  Positive for visual disturbance.   Gastrointestinal:  Positive for nausea.   Neurological:  Positive for dizziness and numbness (right upper lip).     Objective:     Vital Signs (Most Recent):  Temp: 97.5 °F (36.4 °C) (11/17/24 0833)  Pulse: (!) 56 (11/17/24 1108)  Resp: 20 (11/17/24 1108)  BP: (!) 170/81 (11/17/24 1103)  SpO2: 98 % (11/17/24 1108) Vital Signs (24h Range):  Temp:  [97.5 °F (36.4 °C)] 97.5 °F (36.4 °C)  Pulse:  [52-58] 56  Resp:  [10-22] 20  SpO2:  [94 %-99 %] 98 %  BP: (168-189)/(76-86) 170/81     Weight: 81.6 kg (180 lb)  Body mass index is 28.19 kg/m².     Physical Exam  Vitals reviewed.   Constitutional:       Appearance: Normal appearance.   HENT:      Head: Normocephalic and atraumatic.      Nose: No congestion.      Mouth/Throat:      Mouth: Mucous membranes are moist.      Pharynx: Oropharynx is clear.   Eyes:      Extraocular Movements: Extraocular movements intact.      Pupils: Pupils are equal, round, and reactive to light.   Cardiovascular:      Rate and Rhythm: Normal rate and regular rhythm.      Pulses: Normal pulses.      Heart sounds: Normal heart sounds.   Pulmonary:      Effort: Pulmonary effort is normal.      Breath sounds: Normal breath sounds.   Abdominal:      General: Bowel sounds are normal.      Palpations: Abdomen is soft.   Musculoskeletal:         General: Normal range of motion.      Cervical back: Normal range of motion and neck  supple.   Skin:     General: Skin is warm and dry.      Capillary Refill: Capillary refill takes less than 2 seconds.   Neurological:      General: No focal deficit present.      Mental Status: He is alert and oriented to person, place, and time. Mental status is at baseline.   Psychiatric:         Mood and Affect: Mood normal.         Behavior: Behavior normal.         Judgment: Judgment normal.              CRANIAL NERVES     CN III, IV, VI   Pupils are equal, round, and reactive to light.       Significant Labs: All pertinent labs within the past 24 hours have been reviewed.  Recent Lab Results         11/17/24  0853   11/17/24  0851   11/17/24  0842        Albumin 4.6                      ALT 20           Anion Gap 12           AST 20           Baso # 0.08           Basophil % 1.1           BILIRUBIN TOTAL 0.5  Comment: For infants and newborns, interpretation of results should be based  on gestational age, weight and in agreement with clinical  observations.    Premature Infant recommended reference ranges:  Up to 24 hours.............<8.0 mg/dL  Up to 48 hours............<12.0 mg/dL  3-5 days..................<15.0 mg/dL  6-29 days.................<15.0 mg/dL             BUN 20           Calcium 9.5           Chloride 106           Cholesterol Total 189  Comment: The National Cholesterol Education Program (NCEP) has set the  following guidelines (reference ranges) for Cholesterol:  Optimal.....................<200 mg/dL  Borderline High.............200-239 mg/dL  High........................> or = 240 mg/dL             CO2 22           Creatinine 1.6           Differential Method Automated           eGFR 43.0           Eos # 0.1           Eos % 0.8           Glucose 180           Gran # (ANC) 5.0           Gran % 67.1           HDL 45  Comment: The National Cholesterol Education Program (NCEP) has set the  following guidelines (reference values) for HDL Cholesterol:  Low...............<40  mg/dL  Optimal...........>60 mg/dL             HDL/Cholesterol Ratio 23.8           Hematocrit 47.5           Hemoglobin 15.9           Immature Grans (Abs) 0.07  Comment: Mild elevation in immature granulocytes is non specific and   can be seen in a variety of conditions including stress response,   acute inflammation, trauma and pregnancy. Correlation with other   laboratory and clinical findings is essential.             Immature Granulocytes 0.9           INR 1.0  Comment: Coumadin Therapy:  2.0 - 3.0 for INR for all indicators except mechanical heart valves  and antiphospholipid syndromes which should use 2.5 - 3.5.             LDL Cholesterol 78.6  Comment: The National Cholesterol Education Program (NCEP) has set the  following guidelines (reference values) for LDL Cholesterol:  Optimal.......................<130 mg/dL  Borderline High...............130-159 mg/dL  High..........................160-189 mg/dL  Very High.....................>190 mg/dL             Lipase 22           Lymph # 1.7           Lymph % 22.3           MCH 30.6           MCHC 33.5           MCV 91           Mono # 0.6           Mono % 7.8           MPV 10.3           Non-HDL Cholesterol 144  Comment: Risk category and Non-HDL cholesterol goals:  Coronary heart disease (CHD)or equivalent (10-year risk of CHD >20%):  Non-HDL cholesterol goal     <130 mg/dL  Two or more CHD risk factors and 10-year risk of CHD <= 20%:  Non-HDL cholesterol goal     <160 mg/dL  0 to 1 CHD risk factor:  Non-HDL cholesterol goal     <190 mg/dL             nRBC 0           Platelet Count 197           POC Creatinine   1.8         POCT Glucose     186       Potassium 4.0           PROTEIN TOTAL 7.5           PT 10.9           RBC 5.20           RDW 14.5           Sample   VENOUS         Sodium 140           Total Cholesterol/HDL Ratio 4.2           Triglycerides 327  Comment: The National Cholesterol Education Program (NCEP) has set the  following guidelines  (reference values) for triglycerides:  Normal......................<150 mg/dL  Borderline High.............150-199 mg/dL  High........................200-499 mg/dL             Troponin I High Sensitivity 5.8  Comment: Troponin results differ between methods. Do not use   results between Troponin methods interchangeably.    Alkaline Phospatase levels above 400 U/L may   cause false positive results.    Access hsTnI should not be used for patients taking   Asfotase emmie (Strensiq).             TSH 2.630           WBC 7.43                   Significant Imaging: I have reviewed all pertinent imaging results/findings within the past 24 hours.    MRA Brain without contrast  Narrative: EXAMINATION:  MRA BRAIN WITHOUT CONTRAST    CLINICAL HISTORY:  Diplopia;Dizziness, persistent/recurrent, cardiac or vascular cause suspected;    TECHNIQUE:  3D TOF MRA without contrast was performed, with maximum intensity projections reviewed.    COMPARISON:  None    FINDINGS:  The internal carotid and vertebrobasilar arteries are patent, without evidence of aneurysm, significant stenosis, or vascular malformation.  The left vertebral artery is dominant, noting tortuosity of intracranial V4 segment.  The right vertebral artery terminates as the right PICA, a normal variant.  The anterior, middle and posterior cerebral arteries are patent and demonstrate no aneurysm, significant stenosis or vascular malformation.  Impression: Negative for large vessel intracranial arterial occlusion.    Electronically signed by: Krish Centeno  Date:    11/17/2024  Time:    11:57  MRI Brain Without Contrast  Narrative: EXAMINATION:  MRI BRAIN WITHOUT CONTRAST    CLINICAL HISTORY:  Dizziness, persistent/recurrent, cardiac or vascular cause suspected;    TECHNIQUE:  Multisequence, multiplanar imaging through the brain performed without IV contrast.    COMPARISON:  CT head 11/17/2024    FINDINGS:  Diffusion-weighted imaging is negative for cerebral or cerebellar  ischemia or focal lesion.  Along the posterior aspect of the allan, to the left of midline, there is a 5 mm linear focus of hyperintensity on the diffusion-weighted sequence, which appears to show corresponding hypointensity on the ADC map.  There is equivocal small T2/FLAIR corresponding signal on routine image sequences and this could potentially represent a tiny acute ischemic infarction.    No intracranial hemorrhage.  No midline shift or mass effect.  Ventricles and sulci are normal in size for age.  Mild periventricular and deep white matter T2/FLAIR hyperintensity, most likely on the basis of microangiopathic change.  Cerebellar hemispheres are unremarkable.  Major intracranial T2 flow voids appear patent.    Mastoid air cells are clear.  Paranasal sinuses are clear.  No bone marrow signal abnormality.  Pituitary gland is within normal limits.  Impression: Equivocal small (5 mm) acute ischemic infarct involving the leftward aspect of the posterior allan.    Otherwise, negative for acute intracranial pathology.    Mild white matter microangiopathic changes.    Electronically signed by: Krish Centeno  Date:    11/17/2024  Time:    11:53  US Carotid Bilateral  Narrative: EXAMINATION:  US CAROTID BILATERAL    CLINICAL HISTORY:  diplopia, vertigo;    TECHNIQUE:  Grayscale, color and spectral Doppler analysis was performed.    CMS Mandated Quality [id number]    Criteria for stenosis is based upon the Society of Radiologist in Ultrasound Consensus Conference, 2003 (Radiology, November 2003, 229, [phone]). This is in accordance with NASCET criteria.    COMPARISON:  None    FINDINGS:  Right: Peak systolic velocity in the CCA is 67.7 cm/sec, and peak systolic velocity within the ICA 35.8 cm/sec, with ICA/CCA ratio of less than 2.  Maximum end diastolic velocities are within normal limits. ECA is patent.    Left: Peak systolic velocity in the CCA is 70.2 cm/sec, and peak systolic velocity within the .0  cm/sec, with ICA/CCA ratio of less than 2.  Maximum end diastolic velocities are within normal limits.  ECA is patent.    The vertebral arteries are patent, with normal waveforms, and normal antegrade flow bilaterally.  Impression: No Doppler findings of hemodynamically significant carotid arterial stenosis or vascular occlusion.    Patent vertebral arteries with antegrade flow bilaterally.    Electronically signed by: Krish Centeno  Date:    11/17/2024  Time:    10:15  CT HEAD FOR STROKE  Narrative: EXAMINATION:  CT HEAD FOR STROKE    CLINICAL HISTORY:  Neuro deficit, acute, stroke suspected;    TECHNIQUE:  Axial imaging obtained through the brain without IV contrast    CMS Mandated Quality Data-CT Radiation Dose-436    All CT scans at this facility dose modulation, iterative reconstruction, and or weight-based dosing when appropriate to reduce radiation dose to as low as reasonably achievable.    COMPARISON:  None    FINDINGS:  Negative for acute intracranial hemorrhage, midline shift, or mass effect.  Ventricles and sulci are normal in size.  Gray-white differentiation is maintained.  Cerebellar hemispheres and brainstem are unremarkable.  Atherosclerotic calcification of intracranial carotid arteries.    No calvarial lesion or fracture.  Mastoid air cells are clear.  Mucosal thickening involving frontal sinuses and left maxillary sinus.  Impression: No CT evidence of acute intracranial pathology.    Findings called to Dr. Craig at the time of dictation.    Electronically signed by: Krish Centeno  Date:    11/17/2024  Time:    08:57      Assessment/Plan:     * Neurologic deficit due to acute ischemic stroke  Patient with dizziness, nausea, right upper lip nausea and  intermittent diplopia,   Antithrombotics for secondary stroke prevention: Antiplatelets: Aspirin: 81 mg daily    Statins for secondary stroke prevention and hyperlipidemia, if present:   Statins: Atorvastatin- 40 mg daily    Aggressive risk factor  modification: HTN, DM, HLD     Rehab efforts: The patient has been evaluated by a stroke team provider and the therapy needs have been fully considered based off the presenting complaints and exam findings. The following therapy evaluations are needed: PT evaluate and treat, OT evaluate and treat    Diagnostics ordered/pending: Carotid ultrasound to assess vasculature, CT scan of head without contrast to asses brain parenchyma, HgbA1C to assess blood glucose levels, Lipid Profile to assess cholesterol levels, MRA head to assess vasculature, MRI head without contrast to assess brain parenchyma, TTE to assess cardiac function/status , TSH to assess thyroid function    VTE prophylaxis: Enoxaparin 40 mg SQ every 24 hours    BP parameters: Infarct: No intervention, SBP <220        Dizziness  S/p acute CVA  Meclizine PRN      Transient diplopia  S/p acute CVA   Outpatient f/u with Ophthalmology      Facial paresthesia  S/p acute CVA  Bedside Vizcaino ordered      Type 2 diabetes mellitus with stage 3b chronic kidney disease, without long-term current use of insulin  Patient's FSGs are controlled on current medication regimen.  Last A1c reviewed-   Lab Results   Component Value Date    HGBA1C 7.2 (H) 06/20/2024     Most recent fingerstick glucose reviewed-   Recent Labs   Lab 11/17/24  0842   POCTGLUCOSE 186*     Current correctional scale  Medium  Maintain anti-hyperglycemic dose as follows-   Antihyperglycemics (From admission, onward)      Start     Stop Route Frequency Ordered    11/17/24 1428  insulin aspart U-100 pen 0-10 Units         -- SubQ Before meals & nightly PRN 11/17/24 1328          Hold Oral hypoglycemics while patient is in the hospital.    Creatine stable for now. BMP reviewed- noted Estimated Creatinine Clearance: 37 mL/min (A) (based on SCr of 1.6 mg/dL (H)). according to latest data. Based on current GFR, CKD stage is stage 3 - GFR 30-59.  Monitor UOP and serial BMP and adjust therapy as needed. Renally  dose meds. Avoid nephrotoxic medications and procedures.    Hyperlipidemia   Patient is chronically on statin.will continue for now. Monitor clinically. Last LDL was   Lab Results   Component Value Date    LDLCALC 78.6 11/17/2024      Start atorvastatin Atorvastatin 40 mg in place of home medication of pravastatin 20    Primary hypertension  Patients blood pressure range in the last 24 hours was: BP  Min: 168/77  Max: 189/83.The patient's inpatient anti-hypertensive regimen is listed below:  Current Antihypertensives  labetaloL injection 10 mg, Every 15 min PRN, Intravenous    Plan  - PERMISSIVE HYPERTENSIVE FOR 24-48 HOURS POST EVENT  - resume BP medications when permissive hypertension timeframe complete      VTE Risk Mitigation (From admission, onward)           Ordered     enoxaparin injection 40 mg  Daily         11/17/24 1306     IP VTE HIGH RISK PATIENT  Once         11/17/24 1306     Place sequential compression device  Until discontinued         11/17/24 1306                                    Elsa Brito NP  Department of Hospital Medicine  [facility] - Emergency Dept

## 2024-11-18 VITALS
TEMPERATURE: 97 F | SYSTOLIC BLOOD PRESSURE: 168 MMHG | BODY MASS INDEX: 27.71 KG/M2 | OXYGEN SATURATION: 97 % | DIASTOLIC BLOOD PRESSURE: 83 MMHG | WEIGHT: 176.56 LBS | RESPIRATION RATE: 17 BRPM | HEART RATE: 62 BPM | HEIGHT: 67 IN

## 2024-11-18 LAB
ALBUMIN SERPL BCP-MCNC: 3.9 G/DL (ref 3.5–5.2)
ALP SERPL-CCNC: 87 U/L (ref 55–135)
ALT SERPL W/O P-5'-P-CCNC: 16 U/L (ref 10–44)
ANION GAP SERPL CALC-SCNC: 8 MMOL/L (ref 8–16)
AORTIC ROOT ANNULUS: 3.1 CM
AORTIC VALVE CUSP SEPERATION: 2 CM
APICAL FOUR CHAMBER EJECTION FRACTION: 70 %
APTT PPP: 30.8 SEC (ref 21–32)
AST SERPL-CCNC: 16 U/L (ref 10–40)
AV INDEX (PROSTH): 0.82
AV MEAN GRADIENT: 4 MMHG
AV PEAK GRADIENT: 7.8 MMHG
AV VALVE AREA BY VELOCITY RATIO: 2.7 CM²
AV VALVE AREA: 2.6 CM²
AV VELOCITY RATIO: 0.86
BASOPHILS # BLD AUTO: 0.08 K/UL (ref 0–0.2)
BASOPHILS NFR BLD: 1.1 % (ref 0–1.9)
BILIRUB SERPL-MCNC: 0.4 MG/DL (ref 0.1–1)
BSA FOR ECHO PROCEDURE: 1.96 M2
BUN SERPL-MCNC: 26 MG/DL (ref 8–23)
CALCIUM SERPL-MCNC: 8.6 MG/DL (ref 8.7–10.5)
CHLORIDE SERPL-SCNC: 109 MMOL/L (ref 95–110)
CO2 SERPL-SCNC: 23 MMOL/L (ref 23–29)
CREAT SERPL-MCNC: 1.6 MG/DL (ref 0.5–1.4)
CV ECHO LV RWT: 0.56 CM
DIFFERENTIAL METHOD BLD: ABNORMAL
DOP CALC AO PEAK VEL: 1.4 M/S
DOP CALC AO VTI: 28.4 CM
DOP CALC LVOT AREA: 3.1 CM2
DOP CALC LVOT DIAMETER: 2 CM
DOP CALC LVOT PEAK VEL: 1.2 M/S
DOP CALC LVOT STROKE VOLUME: 73.5 CM3
DOP CALC MV VTI: 29.3 CM
DOP CALCLVOT PEAK VEL VTI: 23.4 CM
E WAVE DECELERATION TIME: 243 MSEC
E/A RATIO: 0.69
E/E' RATIO: 7.87 M/S
ECHO LV POSTERIOR WALL: 1 CM (ref 0.6–1.1)
EOSINOPHIL # BLD AUTO: 0.1 K/UL (ref 0–0.5)
EOSINOPHIL NFR BLD: 1.5 % (ref 0–8)
ERYTHROCYTE [DISTWIDTH] IN BLOOD BY AUTOMATED COUNT: 14.5 % (ref 11.5–14.5)
EST. GFR  (NO RACE VARIABLE): 43 ML/MIN/1.73 M^2
ESTIMATED AVG GLUCOSE: 163 MG/DL (ref 68–131)
FRACTIONAL SHORTENING: 33.3 % (ref 28–44)
GLUCOSE SERPL-MCNC: 155 MG/DL (ref 70–110)
HBA1C MFR BLD: 7.3 % (ref 4.5–6.2)
HCT VFR BLD AUTO: 43.3 % (ref 40–54)
HGB BLD-MCNC: 14 G/DL (ref 14–18)
IMM GRANULOCYTES # BLD AUTO: 0.06 K/UL (ref 0–0.04)
IMM GRANULOCYTES NFR BLD AUTO: 0.8 % (ref 0–0.5)
INR PPP: 0.9 (ref 0.8–1.2)
INTERVENTRICULAR SEPTUM: 1.2 CM (ref 0.6–1.1)
IVC DIAMETER: 1.3 CM
LEFT ATRIUM AREA SYSTOLIC (APICAL 2 CHAMBER): 16.6 CM2
LEFT ATRIUM AREA SYSTOLIC (APICAL 4 CHAMBER): 13.8 CM2
LEFT ATRIUM SIZE: 3.7 CM
LEFT ATRIUM VOLUME INDEX MOD: 21.3 ML/M2
LEFT ATRIUM VOLUME MOD: 41.1 ML
LEFT INTERNAL DIMENSION IN SYSTOLE: 2.4 CM (ref 2.1–4)
LEFT VENTRICLE DIASTOLIC VOLUME INDEX: 28.2 ML/M2
LEFT VENTRICLE DIASTOLIC VOLUME: 54.43 ML
LEFT VENTRICLE END DIASTOLIC VOLUME APICAL 4 CHAMBER: 88.8 ML
LEFT VENTRICLE END SYSTOLIC VOLUME APICAL 2 CHAMBER: 49.6 ML
LEFT VENTRICLE END SYSTOLIC VOLUME APICAL 4 CHAMBER: 33.2 ML
LEFT VENTRICLE MASS INDEX: 64.3 G/M2
LEFT VENTRICLE SYSTOLIC VOLUME INDEX: 10.4 ML/M2
LEFT VENTRICLE SYSTOLIC VOLUME: 20.16 ML
LEFT VENTRICULAR INTERNAL DIMENSION IN DIASTOLE: 3.6 CM (ref 3.5–6)
LEFT VENTRICULAR MASS: 124.1 G
LV LATERAL E/E' RATIO: 6.56 M/S
LV SEPTAL E/E' RATIO: 9.83 M/S
LVED V (TEICH): 54.43 ML
LVES V (TEICH): 20.16 ML
LVOT MG: 3 MMHG
LVOT MV: 0.73 CM/S
LYMPHOCYTES # BLD AUTO: 2.3 K/UL (ref 1–4.8)
LYMPHOCYTES NFR BLD: 31.3 % (ref 18–48)
MAGNESIUM SERPL-MCNC: 2.1 MG/DL (ref 1.6–2.6)
MCH RBC QN AUTO: 29.7 PG (ref 27–31)
MCHC RBC AUTO-ENTMCNC: 32.3 G/DL (ref 32–36)
MCV RBC AUTO: 92 FL (ref 82–98)
MONOCYTES # BLD AUTO: 0.7 K/UL (ref 0.3–1)
MONOCYTES NFR BLD: 9.3 % (ref 4–15)
MV MEAN GRADIENT: 1 MMHG
MV PEAK A VEL: 0.85 M/S
MV PEAK E VEL: 0.59 M/S
MV PEAK GRADIENT: 5 MMHG
MV STENOSIS PRESSURE HALF TIME: 73 MS
MV VALVE AREA BY CONTINUITY EQUATION: 2.51 CM2
MV VALVE AREA P 1/2 METHOD: 3.01 CM2
NEUTROPHILS # BLD AUTO: 4.1 K/UL (ref 1.8–7.7)
NEUTROPHILS NFR BLD: 56 % (ref 38–73)
NRBC BLD-RTO: 0 /100 WBC
PLATELET # BLD AUTO: 170 K/UL (ref 150–450)
PMV BLD AUTO: 11.3 FL (ref 9.2–12.9)
POCT GLUCOSE: 171 MG/DL (ref 70–110)
POCT GLUCOSE: 192 MG/DL (ref 70–110)
POTASSIUM SERPL-SCNC: 4.1 MMOL/L (ref 3.5–5.1)
PROT SERPL-MCNC: 6.2 G/DL (ref 6–8.4)
PROTHROMBIN TIME: 10.6 SEC (ref 9–12.5)
PV MV: 0.71 M/S
PV PEAK GRADIENT: 4 MMHG
PV PEAK VELOCITY: 1.06 M/S
RA PRESSURE ESTIMATED: 3 MMHG
RBC # BLD AUTO: 4.72 M/UL (ref 4.6–6.2)
RIGHT ATRIUM VOLUME AREA LENGTH APICAL 4 CHAMBER: 34.3 ML
RV TISSUE DOPPLER FREE WALL SYSTOLIC VELOCITY 1 (APICAL 4 CHAMBER VIEW): 13.5 CM/S
SODIUM SERPL-SCNC: 140 MMOL/L (ref 136–145)
TDI LATERAL: 0.09 M/S
TDI SEPTAL: 0.06 M/S
TDI: 0.08 M/S
TRICUSPID ANNULAR PLANE SYSTOLIC EXCURSION: 2.21 CM
WBC # BLD AUTO: 7.28 K/UL (ref 3.9–12.7)
Z-SCORE OF LEFT VENTRICULAR DIMENSION IN END DIASTOLE: -4.17
Z-SCORE OF LEFT VENTRICULAR DIMENSION IN END SYSTOLE: -2.67

## 2024-11-18 PROCEDURE — 96372 THER/PROPH/DIAG INJ SC/IM: CPT | Performed by: NURSE PRACTITIONER

## 2024-11-18 PROCEDURE — 92523 SPEECH SOUND LANG COMPREHEN: CPT

## 2024-11-18 PROCEDURE — 94761 N-INVAS EAR/PLS OXIMETRY MLT: CPT

## 2024-11-18 PROCEDURE — 63600175 PHARM REV CODE 636 W HCPCS: Performed by: NURSE PRACTITIONER

## 2024-11-18 PROCEDURE — 80053 COMPREHEN METABOLIC PANEL: CPT | Performed by: NURSE PRACTITIONER

## 2024-11-18 PROCEDURE — 85730 THROMBOPLASTIN TIME PARTIAL: CPT | Performed by: NURSE PRACTITIONER

## 2024-11-18 PROCEDURE — G0378 HOSPITAL OBSERVATION PER HR: HCPCS

## 2024-11-18 PROCEDURE — 85025 COMPLETE CBC W/AUTO DIFF WBC: CPT | Performed by: NURSE PRACTITIONER

## 2024-11-18 PROCEDURE — 25000003 PHARM REV CODE 250: Performed by: INTERNAL MEDICINE

## 2024-11-18 PROCEDURE — 85610 PROTHROMBIN TIME: CPT | Performed by: NURSE PRACTITIONER

## 2024-11-18 PROCEDURE — 97165 OT EVAL LOW COMPLEX 30 MIN: CPT

## 2024-11-18 PROCEDURE — 97161 PT EVAL LOW COMPLEX 20 MIN: CPT

## 2024-11-18 PROCEDURE — 92610 EVALUATE SWALLOWING FUNCTION: CPT

## 2024-11-18 PROCEDURE — 83735 ASSAY OF MAGNESIUM: CPT | Performed by: NURSE PRACTITIONER

## 2024-11-18 PROCEDURE — 25000003 PHARM REV CODE 250: Performed by: NURSE PRACTITIONER

## 2024-11-18 PROCEDURE — 83036 HEMOGLOBIN GLYCOSYLATED A1C: CPT | Performed by: NURSE PRACTITIONER

## 2024-11-18 RX ORDER — NAPROXEN SODIUM 220 MG/1
81 TABLET, FILM COATED ORAL DAILY
Status: DISCONTINUED | OUTPATIENT
Start: 2024-11-18 | End: 2024-11-18 | Stop reason: HOSPADM

## 2024-11-18 RX ORDER — ATORVASTATIN CALCIUM 80 MG/1
80 TABLET, FILM COATED ORAL DAILY
Qty: 7 TABLET | Refills: 0 | Status: SHIPPED | OUTPATIENT
Start: 2024-11-18 | End: 2024-11-18

## 2024-11-18 RX ORDER — CLOPIDOGREL BISULFATE 75 MG/1
75 TABLET ORAL DAILY
Qty: 7 TABLET | Refills: 0 | Status: SHIPPED | OUTPATIENT
Start: 2024-11-19 | End: 2024-11-18

## 2024-11-18 RX ORDER — ATORVASTATIN CALCIUM 80 MG/1
80 TABLET, FILM COATED ORAL DAILY
Qty: 90 TABLET | Refills: 0 | Status: SHIPPED | OUTPATIENT
Start: 2024-11-18 | End: 2025-02-16

## 2024-11-18 RX ORDER — NAPROXEN SODIUM 220 MG/1
81 TABLET, FILM COATED ORAL DAILY
Start: 2024-11-19 | End: 2025-11-19

## 2024-11-18 RX ORDER — CLOPIDOGREL BISULFATE 75 MG/1
75 TABLET ORAL DAILY
Qty: 90 TABLET | Refills: 0 | Status: SHIPPED | OUTPATIENT
Start: 2024-11-19 | End: 2024-11-18 | Stop reason: HOSPADM

## 2024-11-18 RX ORDER — CLOPIDOGREL BISULFATE 75 MG/1
75 TABLET ORAL DAILY
Status: DISCONTINUED | OUTPATIENT
Start: 2024-11-18 | End: 2024-11-18 | Stop reason: HOSPADM

## 2024-11-18 RX ADMIN — THERA TABS 1 TABLET: TAB at 08:11

## 2024-11-18 RX ADMIN — ASPIRIN 325 MG: 325 TABLET, COATED ORAL at 08:11

## 2024-11-18 RX ADMIN — Medication 1 TABLET: at 08:11

## 2024-11-18 RX ADMIN — FAMOTIDINE 20 MG: 20 TABLET ORAL at 08:11

## 2024-11-18 RX ADMIN — CLOPIDOGREL BISULFATE 75 MG: 75 TABLET, FILM COATED ORAL at 11:11

## 2024-11-18 RX ADMIN — INSULIN ASPART 2 UNITS: 100 INJECTION, SOLUTION INTRAVENOUS; SUBCUTANEOUS at 12:11

## 2024-11-18 RX ADMIN — INSULIN ASPART 2 UNITS: 100 INJECTION, SOLUTION INTRAVENOUS; SUBCUTANEOUS at 08:11

## 2024-11-18 RX ADMIN — ASPIRIN 81 MG 81 MG: 81 TABLET ORAL at 11:11

## 2024-11-18 NOTE — RESPIRATORY THERAPY
11/17/24 2109   Patient Assessment/Suction   Level of Consciousness (AVPU) alert   Respiratory Effort Unlabored   Expansion/Accessory Muscles/Retractions no use of accessory muscles;no retractions   All Lung Fields Breath Sounds Anterior:;Lateral:;clear   Rhythm/Pattern, Respiratory no shortness of breath reported;unlabored   PRE-TX-O2   Device (Oxygen Therapy) room air   SpO2 95 %   Pulse Oximetry Type Intermittent   $ Pulse Oximetry - Multiple Charge Pulse Oximetry - Multiple   Pulse 62   Resp 17   Education   $ Education 15 min

## 2024-11-18 NOTE — DISCHARGE SUMMARY
Formerly [facility], [facility] Medicine  Discharge Summary      Patient Name: Ruy Ramos Jr.  MRN: [id number]  LESLIE: [id number]  Patient Class: IP- Inpatient  Admission Date: 11/17/2024  Hospital Length of Stay: 1 days  Discharge Date and Time:  11/18/2024 2:47 PM  Attending Physician: Marcihuy Mejía MD   Discharging Provider: Marichuy Mejía MD, MD  Primary Care Provider: Rigoberto Bunch MD    Primary Care Team: Networked reference to record PCT     HPI:   78 y/o male with pMHx of CEA, PAF, T2DM, HTN, and HLD who presented to the ED via POV accompanied by wife with reports of dizziness and and epigastric queasiness.  Patient reports that he noticed he was dizzy when turning his head or moving a certain way at approximately 10:00 a.m. Saturday morning.  Patient then began with intermittent diplopia.  Then patient noticed that he was having then mid epigastric nausea/queasiness after he took his a.m. medications.  Patient states that he was previously on aspirin but was recently taken off by his cardiologist.  On arrival to the ED pulse 52, /86, temp 97.5°, SpO2 99% on room air.  Blood work performed in the ED with CO2 22, glucose 180, creatinine 1.6, GFR 43, triglycerides 327.  CTA head without contrast with no evidence of acute intracranial pathology.  Additional testing of carotid ultrasound bilaterally with no Doppler findings of hemodynamically significant carotid arterial stenosis or vascular occlusion.  MRI without contrast with equivocal small (5 mm) acute ischemic infarct involving the leftward aspect of the posterior allan.  Otherwise negative for acute intracranial pathology.  Mild white matter microangiopathic changes.  MRI brain negative for LVO.  Decision made to admit patient for further evaluation.       * No surgery found *      Hospital Course:   The patient was admitted and monitored with serial neurologic checks. He was continued on aspirin and plavix. Neurology was  consulted. The patient had complete resolution of symptoms and required no therapy. Neurology cleared him for d/c, and he was discharged home in stable condition.     Goals of Care Treatment Preferences:  Code Status: Full Code      SDOH Screening:  The patient was screened for utility difficulties, food insecurity, transport difficulties, housing insecurity, and interpersonal safety and there were no concerns identified this admission.     Consults:   Consults (From admission, onward)          Status Ordering Provider     Inpatient consult to Registered Dietitian/Nutritionist  Once        Provider:  (Not yet assigned)    Completed SHAWNEE PRECIADO A     IP consult to case management/social work  Once        Provider:  (Not yet assigned)    Acknowledged SHAWNEE PRECIADO A     Inpatient consult to Neurology  Once        Provider:  Severiano Rich MD    Completed GORDON VORA            Neuro  * Neurologic deficit due to acute ischemic stroke  F/U outpatient with neurology      Facial paresthesia  Resolved      Cardiac/Vascular  Hyperlipidemia  Home med    Primary hypertension  Home meds    Endocrine  Type 2 diabetes mellitus with stage 3b chronic kidney disease, without long-term current use of insulin  Home meds    Other  Transient diplopia  Resolved      Dizziness  Resolved        Final Active Diagnoses:    Diagnosis Date Noted POA    PRINCIPAL PROBLEM:  Neurologic deficit due to acute ischemic stroke [I63.9, R29.818] 11/17/2024 Yes    Dizziness [R42] 11/17/2024 Yes    Transient diplopia [H53.2] 11/17/2024 Yes    Facial paresthesia [R20.2] 11/17/2024 Yes    Primary hypertension [I10] 06/29/2021 Yes    Hyperlipidemia [E78.5] 06/29/2021 Yes    Type 2 diabetes mellitus with stage 3b chronic kidney disease, without long-term current use of insulin [E11.22, N18.32] 03/10/2020 Yes      Problems Resolved During this Admission:       Discharged Condition: stable    Disposition: Home or Self Care    Follow Up:    Follow-up Information       Rigoberto Bunch MD Follow up.    Specialties: Family Medicine, Home Health Services, Hospice Services  Contact information:  [address] [facility]  [address]  [facility] [address]  [phone]                           Patient Instructions:      Ambulatory referral/consult to Neurology   Standing Status: Future   Referral Priority: Routine Referral Type: Consultation   Referral Reason: Specialty Services Required   Requested Specialty: Neurology   Number of Visits Requested: 1     Diet diabetic     Activity as tolerated       Significant Diagnostic Studies: N/A    Pending Diagnostic Studies:       None           Medications:  Reconciled Home Medications:      Medication List        START taking these medications      aspirin 81 MG Chew  Take 1 tablet (81 mg total) by mouth once daily.  Start taking on: November 19, 2024     clopidogreL 75 mg tablet  Commonly known as: PLAVIX  Take 1 tablet (75 mg total) by mouth once daily.  Start taking on: November 19, 2024            CHANGE how you take these medications      metoprolol succinate 25 MG 24 hr tablet  Commonly known as: TOPROL-XL  Take 1 tablet (25 mg total) by mouth once daily.  What changed: when to take this            CONTINUE taking these medications      ascorbic acid (vitamin C) 500 MG tablet  Commonly known as: VITAMIN C  Take 1 tablet by mouth once daily.     * blood sugar diagnostic Strp  Commonly known as: ACCU-CHEK AUDREY PLUS TEST STRP  1 strip by Misc.(Non-Drug; Combo Route) route once daily.     * blood sugar diagnostic Strp  To check BG 1 times daily, to use with insurance preferred meter     blood-glucose meter kit  To check BG 1 times daily, to use with insurance preferred meter     canagliflozin 300 mg Tab tablet  Commonly known as: INVOKANA  Take 1 tablet (300 mg total) by mouth once daily.     ezetimibe 10 mg tablet  Commonly known as: ZETIA  Take 1 tablet (10 mg total) by mouth once daily.     FOLPLEX 2.2 2.2-25-0.5 mg  Tab  Generic drug: folic acid-vit B6-vit B12  Take 1 tablet by mouth once daily.     lancets Misc  To check BG 1 times daily, to use with insurance preferred meter     MULTIVITAMIN ORAL  Take 1 tablet by mouth once daily.     omega 3-dha-epa-fish oil 900-1,400 mg Cpdr  Take 2 capsules by mouth 2 (two) times daily.     pioglitazone 15 MG tablet  Commonly known as: ACTOS  Take 1 tablet (15 mg total) by mouth once daily.     pravastatin 20 MG tablet  Commonly known as: PRAVACHOL  Take 1 tablet (20 mg total) by mouth once daily. For cholesterol.     telmisartan 40 MG Tab  Commonly known as: MICARDIS  Take 40 mg by mouth once daily.     vitamin D 1000 units Tab  Commonly known as: VITAMIN D3  Take 2,000 Units by mouth every evening.     zinc acetate 50 mg (zinc) Cap  Take 1 capsule by mouth Daily.           * This list has 2 medication(s) that are the same as other medications prescribed for you. Read the directions carefully, and ask your doctor or other care provider to review them with you.                  Indwelling Lines/Drains at time of discharge:   Lines/Drains/Airways       None                   Time spent on the discharge of patient: 35 minutes         Marichuy Mejía MD, MD  Department of Hospital Medicine  [facility]

## 2024-11-18 NOTE — PLAN OF CARE
Pt clear for DC from case management standpoint. Discharging to home.  PCP hospital follow up scheduled and added to AVS.  Spouse to provide transportation home at discharge.       11/18/24 1500   Final Note   Assessment Type Final Discharge Note   Anticipated Discharge Disposition Home   Hospital Resources/Appts/Education Provided Appointments scheduled and added to AVS

## 2024-11-18 NOTE — HOSPITAL COURSE
The patient was admitted and monitored with serial neurologic checks. He was continued on aspirin and plavix. Neurology was consulted. The patient had complete resolution of symptoms and required no therapy. Neurology cleared him for d/c, and he was discharged home in stable condition.

## 2024-11-18 NOTE — CONSULTS
Educated pt on heart healthy diet. Education focused on including healthy fats- gave examples and lowering LDL levels by excluding saturated/trans fat in the diet. Went over types of foods that have saturated/trans fat. Encouraged cooking with olive oil instead of butter. Choosing whole grains over refined grains, choosing canned foods with no salt added and plain frozen veggies instead of veggies cooked in butter and cream sauces. Encouraged patient to choose leaner cuts of meat and decreasing high fat meats such as portillo, sausage, hot dogs, etc. Educated pt on decreasing sodium in diet. To try not to cook with salt and use herbs and spices to make food more flavorful. To limit eating out-if patient chooses to eat out, informed patient to discuss with  to cook meats and veggies with no salt and olive oil instead of butter. Provided handouts on all of these topics for pt to use in the future. Also provided RD contact information for pt to call with future questions.

## 2024-11-18 NOTE — PT/OT/SLP EVAL
Speech Language Pathology Evaluation  Cognitive/Bedside Swallow    Patient Name:  Ruy Ramos Jr.   MRN:  [id number]  Admitting Diagnosis: Neurologic deficit due to acute ischemic stroke    Recommendations:                  General Recommendations:  Follow-up not indicated  Diet recommendations:  Regular, Thin   Aspiration Precautions: Standard aspiration precautions   General Precautions: Standard,    Communication strategies:  none    Assessment:     Ruy Ramos Jr. is a 79 y.o. male with an admitting diagnosis of Neurologic deficit due to acute ischemic stroke. Clinical swallow and cognitive communication evaluations completed. Oropharyngeal swallow judged to be WFL. REC Regular textures (IDDSI 7) with thin liquids. Speech, language and cognition appear to be at baseline and WFL. Diet recs communicated with patients nurse, Odette (secure chat), at 1213. Skilled ST not indicated at this time. Please re consult PRN.       History:   PER HPI:  78 y/o male with pMHx of CEA, PAF, T2DM, HTN, and HLD who presented to the ED via POV accompanied by wife with reports of dizziness and epigastric queasiness.  Patient reports that he noticed he was dizzy when turning his head or moving a certain way at approximately 10:00 a.m. Saturday morning.  Patient then began with intermittent diplopia.  Then patient noticed that he was having then mid epigastric nausea/queasiness after he took his a.m. medications.  Patient states that he was previously on aspirin but was recently taken off by his cardiologist.  On arrival to the ED pulse 52, /86, temp 97.5°, SpO2 99% on room air.  Blood work performed in the ED with CO2 22, glucose 180, creatinine 1.6, GFR 43, triglycerides 327.  CTA head without contrast with no evidence of acute intracranial pathology.  Additional testing of carotid ultrasound bilaterally with no Doppler findings of hemodynamically significant carotid arterial stenosis or vascular occlusion.  MRI  without contrast with equivocal small (5 mm) acute ischemic infarct involving the leftward aspect of the posterior allan.  Otherwise negative for acute intracranial pathology.  Mild white matter microangiopathic changes.  MRI brain negative for LVO.  Decision made to admit patient for further evaluation.     Past Medical History:   Diagnosis Date    Diabetes mellitus, type 2     DM type 2 without retinopathy     Hypertension     Kidney stone        Past Surgical History:   Procedure Laterality Date    APPENDECTOMY  5/30/2020    Procedure: APPENDECTOMY;  Surgeon: Eusebio Gil III, MD;  Location: [facility] OR;  Service: General;;    arm fracture surgery      FRACTURE SURGERY      Left arm    LAPAROSCOPIC APPENDECTOMY N/A 5/30/2020    Procedure: APPENDECTOMY, LAPAROSCOPIC VS OPEN;  Surgeon: Eusebio Gil III, MD;  Location: [facility] OR;  Service: General;  Laterality: N/A;    Open Appendectomy w/ partial cecectomy   05/31/2020    Dr. Gil        Social History: Patient lives with wife.    Prior Intubation HX:  None this admit    Modified Barium Swallow: NA    Imaging:  Imaging Results              MRI Brain Without Contrast (Final result)  Result time 11/17/24 11:53:42      Final result by Krish Centeno MD (11/17/24 11:53:42)                   Impression:      Equivocal small (5 mm) acute ischemic infarct involving the leftward aspect of the posterior allan.    Otherwise, negative for acute intracranial pathology.    Mild white matter microangiopathic changes.      Electronically signed by: Krish Centeno  Date:    11/17/2024  Time:    11:53               Narrative:    EXAMINATION:  MRI BRAIN WITHOUT CONTRAST    CLINICAL HISTORY:  Dizziness, persistent/recurrent, cardiac or vascular cause suspected;    TECHNIQUE:  Multisequence, multiplanar imaging through the brain performed without IV contrast.    COMPARISON:  CT head 11/17/2024    FINDINGS:  Diffusion-weighted imaging is negative for cerebral or cerebellar  ischemia or focal lesion.  Along the posterior aspect of the allan, to the left of midline, there is a 5 mm linear focus of hyperintensity on the diffusion-weighted sequence, which appears to show corresponding hypointensity on the ADC map.  There is equivocal small T2/FLAIR corresponding signal on routine image sequences and this could potentially represent a tiny acute ischemic infarction.    No intracranial hemorrhage.  No midline shift or mass effect.  Ventricles and sulci are normal in size for age.  Mild periventricular and deep white matter T2/FLAIR hyperintensity, most likely on the basis of microangiopathic change.  Cerebellar hemispheres are unremarkable.  Major intracranial T2 flow voids appear patent.    Mastoid air cells are clear.  Paranasal sinuses are clear.  No bone marrow signal abnormality.  Pituitary gland is within normal limits.                                       MRA Brain without contrast (Final result)  Result time 11/17/24 11:57:13      Final result by Krish Centeno MD (11/17/24 11:57:13)                   Impression:      Negative for large vessel intracranial arterial occlusion.      Electronically signed by: Krish Centeno  Date:    11/17/2024  Time:    11:57               Narrative:    EXAMINATION:  MRA BRAIN WITHOUT CONTRAST    CLINICAL HISTORY:  Diplopia;Dizziness, persistent/recurrent, cardiac or vascular cause suspected;    TECHNIQUE:  3D TOF MRA without contrast was performed, with maximum intensity projections reviewed.    COMPARISON:  None    FINDINGS:  The internal carotid and vertebrobasilar arteries are patent, without evidence of aneurysm, significant stenosis, or vascular malformation.  The left vertebral artery is dominant, noting tortuosity of intracranial V4 segment.  The right vertebral artery terminates as the right PICA, a normal variant.  The anterior, middle and posterior cerebral arteries are patent and demonstrate no aneurysm, significant stenosis or vascular  malformation.                                       US Carotid Bilateral (Final result)  Result time 11/17/24 10:15:07      Final result by Krish Centeno MD (11/17/24 10:15:07)                   Impression:      No Doppler findings of hemodynamically significant carotid arterial stenosis or vascular occlusion.    Patent vertebral arteries with antegrade flow bilaterally.      Electronically signed by: Krish Centeno  Date:    11/17/2024  Time:    10:15               Narrative:    EXAMINATION:  US CAROTID BILATERAL    CLINICAL HISTORY:  diplopia, vertigo;    TECHNIQUE:  Grayscale, color and spectral Doppler analysis was performed.    CMS Mandated Quality [id number]    Criteria for stenosis is based upon the Society of Radiologist in Ultrasound Consensus Conference, 2003 (Radiology, November 2003, 229, 340-346). This is in accordance with NASCET criteria.    COMPARISON:  None    FINDINGS:  Right: Peak systolic velocity in the CCA is 67.7 cm/sec, and peak systolic velocity within the ICA 35.8 cm/sec, with ICA/CCA ratio of less than 2.  Maximum end diastolic velocities are within normal limits. ECA is patent.    Left: Peak systolic velocity in the CCA is 70.2 cm/sec, and peak systolic velocity within the .0 cm/sec, with ICA/CCA ratio of less than 2.  Maximum end diastolic velocities are within normal limits.  ECA is patent.    The vertebral arteries are patent, with normal waveforms, and normal antegrade flow bilaterally.                                       CT HEAD FOR STROKE (Final result)  Result time 11/17/24 08:57:56      Final result by Krish Centeno MD (11/17/24 08:57:56)                   Impression:      No CT evidence of acute intracranial pathology.    Findings called to Dr. Craig at the time of dictation.      Electronically signed by: Krish Centeno  Date:    11/17/2024  Time:    08:57               Narrative:    EXAMINATION:  CT HEAD FOR STROKE    CLINICAL HISTORY:  Neuro deficit,  "acute, stroke suspected;    TECHNIQUE:  Axial imaging obtained through the brain without IV contrast    CMS Mandated Quality Data-CT Radiation Dose-436    All CT scans at this facility dose modulation, iterative reconstruction, and or weight-based dosing when appropriate to reduce radiation dose to as low as reasonably achievable.    COMPARISON:  None    FINDINGS:  Negative for acute intracranial hemorrhage, midline shift, or mass effect.  Ventricles and sulci are normal in size.  Gray-white differentiation is maintained.  Cerebellar hemispheres and brainstem are unremarkable.  Atherosclerotic calcification of intracranial carotid arteries.    No calvarial lesion or fracture.  Mastoid air cells are clear.  Mucosal thickening involving frontal sinuses and left maxillary sinus.                                       Prior diet: Regular/thin.    Occupation/hobbies/homemaking: Pt/family report PLOF as independent with ambulation and ADLS. Pt does drive. Level of education is HS graduate + 3 years of college. Retired .    Subjective     No c/o.   "I dont have any trouble swallowing."    Respiratory Status: Room air    Objective:   Pt seen for clinical swallow and cognitive communication evaluations. He is AAOx4 and cooperative. Daughter at bedside. Pt and daughter deny any changes in speech, language or cognition. Denies dysphagia. Reports diplopia has resolved.     Cognitive Status:   MoCA (Version 8.1) administered with pt achieving a score of 22/30 suggesting possible mild cognitive-linguistic impairment, however, further evaluation indicated cognitive linguistic skills to be WFL for current functional status. Pt earned 3 of 5 points on visuospatial/executive functions, 3 of 3 points on naming, 5 of 6 points for attention, 2 of 3 points for language, 2 of 2 points for abstraction, 2 of 5 points for delayed recall and 4 of 6 points for orientation.      Behavioral Observations: Pleasant, alert & " appropriate  Memory; WFL    Immediate: Intact as demonstrated by Pt's ability to repeat sentences, numerical items, and 5/5 unrelated words    Working: Intact as demonstrated by Pt's ability to apply verbally provided content to answer functional math/time calculations   Short-term: Pt able to recall 3/5 items ind'ly increasing to 5/5 with category cue. Pt demonstrates functional recall by providing detailed information such as recent medical history and recent daily events (meals/previous therapies/etc.).  Long-term: Intact; Pt provided detailed biographical and medical history   Orientation: orientedx4  Attention: Intact; Pt able to demonstrate adequate focused, sustained and alternating attention within MOCA tasks including target detection using tapping, serial subtractions,  symbol trails   Problem Solving: Intact; Pt provided appropriate responses to judgement/safety situations and completed time and money word problems with 100% acc.   Pragmatics: WNL    Verbal Fluency: Given phonemic category x1 and one minute time constraint Pt generated 10 items ind'ly (average >11);    Language: Fluent and appropriate at the conversational level; no evident deficits     Receptive Language:   Comprehension: WFL  Follows complex commands   Answers complex Y/N questions     Expressive Language:  Verbal:    Repetition: 3/3 unrelated items and sentences w/ 100% acc   Naming: confrontational namin% acc of common objects present at bedside  Conversational speech: Fluent and appropriate at the conversational level without evidence of word retrieval, semantic or syntactic error       Motor Speech:  No evidence of Apraxia of Speech or Dysarthria  100% intelligible     Voice: WNL    Visual-Spatial: WNL  Attending to R and L planes w/out evidence of inattention, neglect or preferential gaze     Oral Musculature Evaluation  Oral Musculature: WFL  Dentition: present and adequate  Secretion Management: adequate  Mucosal Quality:  good  Mandibular Strength and Mobility: WFL  Oral Labial Strength and Mobility: WFL  Lingual Strength and Mobility: WFL  Velar Elevation: WFL  Buccal Strength and Mobility: WFL  Voice Prior to PO Intake: clear    Bedside Swallow Eval:   Consistencies Assessed:  Thin liquids --via cup and straw  Puree --applesauce  Mixed consistencies --diced peaches  Solids --hao cracker      Oral Phase:   WNL    Pharyngeal Phase:   no overt clinical signs/symptoms of aspiration  no overt clinical signs/symptoms of pharyngeal dysphagia    Compensatory Strategies  None    Treatment: Pt/family educated re: results/recs of evaluation. Receptive to information provided.     Plan:     Patient to be seen:      Plan of Care expires:     Plan of Care reviewed with:  patient, daughter   SLP Follow-Up:  No       Discharge recommendations:  Therapy Intensity Recommendations at Discharge: No Therapy Indicated   Barriers to Discharge:  None    Time Tracking:     SLP Treatment Date:   11/18/24  Speech Start Time:  1129  Speech Stop Time:  1148     Speech Total Time (min):  19 min    Billable Minutes: Eval 11 , Eval Swallow and Oral Function 8, and Total Time 19    11/18/2024

## 2024-11-18 NOTE — PROGRESS NOTES
[facility] Medicine  Progress Note    Patient Name: Ruy Ramos Jr.  MRN: [id number]  Patient Class: IP- Inpatient   Admission Date: 11/17/2024  Length of Stay: 1 days  Attending Physician: Marichuy Mejía MD  Primary Care Provider: Rigoberto Bunch MD        Subjective:     Principal Problem:Neurologic deficit due to acute ischemic stroke        HPI:  80 y/o male with pMHx of CEA, PAF, T2DM, HTN, and HLD who presented to the ED via POV accompanied by wife with reports of dizziness and and epigastric queasiness.  Patient reports that he noticed he was dizzy when turning his head or moving a certain way at approximately 10:00 a.m. Saturday morning.  Patient then began with intermittent diplopia.  Then patient noticed that he was having then mid epigastric nausea/queasiness after he took his a.m. medications.  Patient states that he was previously on aspirin but was recently taken off by his cardiologist.  On arrival to the ED pulse 52, /86, temp 97.5°, SpO2 99% on room air.  Blood work performed in the ED with CO2 22, glucose 180, creatinine 1.6, GFR 43, triglycerides 327.  CTA head without contrast with no evidence of acute intracranial pathology.  Additional testing of carotid ultrasound bilaterally with no Doppler findings of hemodynamically significant carotid arterial stenosis or vascular occlusion.  MRI without contrast with equivocal small (5 mm) acute ischemic infarct involving the leftward aspect of the posterior allan.  Otherwise negative for acute intracranial pathology.  Mild white matter microangiopathic changes.  MRI brain negative for LVO.  Decision made to admit patient for further evaluation.       Overview/Hospital Course:  The patient was admitted and monitored with serial neurologic checks. He was continued on aspirin and plavix. Neurology was consulted.    Interval History: Patient states he feels okay    Review of Systems   Constitutional:  Negative for  activity change, appetite change, chills and fever.   HENT:  Negative for congestion, ear pain, nosebleeds and sinus pain.    Eyes:  Positive for visual disturbance. Negative for discharge and itching.   Respiratory:  Negative for apnea, cough, chest tightness and shortness of breath.    Cardiovascular:  Negative for chest pain, palpitations and leg swelling.   Gastrointestinal:  Positive for nausea. Negative for abdominal distention, abdominal pain and vomiting.   Genitourinary:  Negative for difficulty urinating, dysuria, flank pain and frequency.   Musculoskeletal:  Negative for arthralgias, back pain, joint swelling and myalgias.   Skin:  Negative for color change, pallor and rash.   Neurological:  Positive for numbness. Negative for dizziness, weakness, light-headedness and headaches.   Psychiatric/Behavioral:  Negative for agitation, behavioral problems, confusion and suicidal ideas.      Objective:     Vital Signs (Most Recent):  Temp: 97.4 °F (36.3 °C) (11/18/24 1156)  Pulse: 62 (11/18/24 1156)  Resp: 17 (11/18/24 1156)  BP: (!) 168/83 (11/18/24 1156)  SpO2: 97 % (11/18/24 1156) Vital Signs (24h Range):  Temp:  [97.4 °F (36.3 °C)-98.3 °F (36.8 °C)] 97.4 °F (36.3 °C)  Pulse:  [55-76] 62  Resp:  [17-22] 17  SpO2:  [93 %-97 %] 97 %  BP: (130-168)/() 168/83     Weight: 80.1 kg (176 lb 9.4 oz)  Body mass index is 27.66 kg/m².    Intake/Output Summary (Last 24 hours) at 11/18/2024 1316  Last data filed at 11/18/2024 1235  Gross per 24 hour   Intake 460 ml   Output 400 ml   Net 60 ml         Physical Exam  Vitals reviewed.   Constitutional:       General: He is not in acute distress.     Appearance: Normal appearance. He is normal weight. He is not ill-appearing.   HENT:      Head: Normocephalic and atraumatic.      Nose: Nose normal.      Mouth/Throat:      Mouth: Mucous membranes are moist.      Pharynx: Oropharynx is clear.   Eyes:      General: No scleral icterus.     Extraocular Movements: Extraocular  movements intact.      Conjunctiva/sclera: Conjunctivae normal.      Pupils: Pupils are equal, round, and reactive to light.   Cardiovascular:      Rate and Rhythm: Normal rate and regular rhythm.      Pulses: Normal pulses.      Heart sounds: Normal heart sounds. No murmur heard.     No gallop.   Pulmonary:      Effort: Pulmonary effort is normal. No respiratory distress.      Breath sounds: Normal breath sounds. No wheezing, rhonchi or rales.   Chest:      Chest wall: No tenderness.   Abdominal:      General: Abdomen is flat. There is no distension.      Palpations: Abdomen is soft.      Tenderness: There is no abdominal tenderness.   Musculoskeletal:         General: No swelling or tenderness.      Cervical back: Normal range of motion and neck supple. No rigidity or tenderness.      Right lower leg: No edema.      Left lower leg: No edema.   Skin:     General: Skin is warm and dry.   Neurological:      General: No focal deficit present.      Mental Status: He is alert and oriented to person, place, and time. Mental status is at baseline.      Motor: No weakness.   Psychiatric:         Mood and Affect: Mood normal.         Behavior: Behavior normal.         Thought Content: Thought content normal.             Significant Labs: All pertinent labs within the past 24 hours have been reviewed.    Significant Imaging: I have reviewed all pertinent imaging results/findings within the past 24 hours.    Assessment/Plan:      * Neurologic deficit due to acute ischemic stroke  Patient with dizziness, nausea, right upper lip nausea and  intermittent diplopia,   Antithrombotics for secondary stroke prevention: Antiplatelets: Aspirin: 81 mg daily    Statins for secondary stroke prevention and hyperlipidemia, if present:   Statins: Atorvastatin- 40 mg daily    Aggressive risk factor modification: HTN, DM, HLD     Rehab efforts: The patient has been evaluated by a stroke team provider and the therapy needs have been fully  considered based off the presenting complaints and exam findings. The following therapy evaluations are needed: PT evaluate and treat, OT evaluate and treat    Diagnostics ordered/pending: Carotid ultrasound to assess vasculature, CT scan of head without contrast to asses brain parenchyma, HgbA1C to assess blood glucose levels, Lipid Profile to assess cholesterol levels, MRA head to assess vasculature, MRI head without contrast to assess brain parenchyma, TTE to assess cardiac function/status , TSH to assess thyroid function    VTE prophylaxis: Enoxaparin 40 mg SQ every 24 hours    BP parameters: Infarct: No intervention, SBP <220        Facial paresthesia  S/p acute CVA  Bedside Vizcaino ordered      Transient diplopia  S/p acute CVA   Outpatient f/u with Ophthalmology      Dizziness  S/p acute CVA  Meclizine PRN      Type 2 diabetes mellitus with stage 3b chronic kidney disease, without long-term current use of insulin  Patient's FSGs are controlled on current medication regimen.  Last A1c reviewed-   Lab Results   Component Value Date    HGBA1C 7.2 (H) 06/20/2024     Most recent fingerstick glucose reviewed-   Recent Labs   Lab 11/17/24  0842   POCTGLUCOSE 186*     Current correctional scale  Medium  Maintain anti-hyperglycemic dose as follows-   Antihyperglycemics (From admission, onward)      Start     Stop Route Frequency Ordered    11/17/24 1428  insulin aspart U-100 pen 0-10 Units         -- SubQ Before meals & nightly PRN 11/17/24 1328          Hold Oral hypoglycemics while patient is in the hospital.    Creatine stable for now. BMP reviewed- noted Estimated Creatinine Clearance: 37 mL/min (A) (based on SCr of 1.6 mg/dL (H)). according to latest data. Based on current GFR, CKD stage is stage 3 - GFR 30-59.  Monitor UOP and serial BMP and adjust therapy as needed. Renally dose meds. Avoid nephrotoxic medications and procedures.    Hyperlipidemia   Patient is chronically on statin.will continue for now. Monitor  clinically. Last LDL was   Lab Results   Component Value Date    LDLCALC 78.6 11/17/2024      Start atorvastatin Atorvastatin 40 mg in place of home medication of pravastatin 20    Primary hypertension  Patients blood pressure range in the last 24 hours was: BP  Min: 168/77  Max: 189/83.The patient's inpatient anti-hypertensive regimen is listed below:  Current Antihypertensives  labetaloL injection 10 mg, Every 15 min PRN, Intravenous    Plan  - PERMISSIVE HYPERTENSIVE FOR 24-48 HOURS POST EVENT  - resume BP medications when permissive hypertension timeframe complete      VTE Risk Mitigation (From admission, onward)           Ordered     enoxaparin injection 40 mg  Daily         11/17/24 1306     IP VTE HIGH RISK PATIENT  Once         11/17/24 1306     Place sequential compression device  Until discontinued         11/17/24 1306                    Discharge Planning   MYRTLE: 11/19/2024     Code Status: Full Code   Is the patient medically ready for discharge?:     Reason for patient still in hospital (select all that apply): Patient trending condition  Discharge Plan A: Home with family                  Marichuy Mejía MD, MD  Department of Hospital Medicine   [facility]

## 2024-11-18 NOTE — PLAN OF CARE
[facility]  Initial Discharge Assessment       Primary Care Provider: Rigoberto Bunch MD    Admission Diagnosis: Neurologic deficit due to acute ischemic cerebrovascular accident (CVA) [I63.9, R29.818]    Admission Date: 11/17/2024  Expected Discharge Date: 11/19/2024    Transition of Care Barriers: None    Payor: HU[employer] MANAGED MEDICARE / Plan: HU[employer] MEDICARE PPO / Product Type: Medicare Advantage /     Extended Emergency Contact Information  Primary Emergency Contact: RichardLizz  Address: [address] [place] [address]           [place] [address] [facility]  Home Phone: [phone]  Mobile Phone: [phone]  Relation: Spouse  Preferred language: English   needed? No    Discharge Plan A: Home with family  Discharge Plan B: Home      [id number] [place] - [address] AT [facility]  [address]  [place] [address]  Phone: [phone] Fax: [phone]    [facility] - [place] [address] [facility]  [address] [facility] [address]  Phone: [phone] Fax: [phone]    Discharge assessment completed at bedside with pt, spouse and daughter.  Verified information on facesheet.  Reports lives at listed address with spouse, is independent with activities and drives self to apts.  Denies HH, HD, coumadin, recent hospital stay within 30 days, opt services and DME other than glucometer.  Discharge plan is home.  Spouse to provide transportation home at discharge.    Initial Assessment (most recent)       Adult Discharge Assessment - 11/18/24 1028          Discharge Assessment    Assessment Type Discharge Planning Assessment     Confirmed/corrected address, phone number and insurance Yes     Confirmed Demographics Correct on Facesheet     Source of Information patient     Does patient/caregiver understand observation status Yes     People in Home spouse     Do you expect to return to your current living  situation? Yes     Do you have help at home or someone to help you manage your care at home? No     Prior to hospitilization cognitive status: Alert/Oriented     Current cognitive status: Alert/Oriented     Walking or Climbing Stairs Difficulty no     Dressing/Bathing Difficulty no     Equipment Currently Used at Home glucometer     Readmission within 30 days? No     Patient currently being followed by outpatient case management? No     Do you currently have service(s) that help you manage your care at home? No     Do you take prescription medications? Yes     Do you have prescription coverage? Yes     Do you have any problems affording any of your prescribed medications? No     Is the patient taking medications as prescribed? yes     Who is going to help you get home at discharge? spouse     How do you get to doctors appointments? car, drives self     Are you on dialysis? No     Do you take coumadin? No     Discharge Plan A Home with family     Discharge Plan B Home     DME Needed Upon Discharge  none     Discharge Plan discussed with: Patient;Spouse/sig other;Adult children     Transition of Care Barriers None

## 2024-11-18 NOTE — PT/OT/SLP EVAL
Occupational Therapy   Evaluation and Discharge Note    Name: Ruy Ramos Jr.  MRN: [id number]  Admitting Diagnosis: Neurologic deficit due to acute ischemic stroke  Recent Surgery: * No surgery found *      Recommendations:     Discharge Recommendations: No Therapy Indicated  Discharge Equipment Recommendations: none  Barriers to discharge:  None    Assessment:     Ruy Ramos Jr. is a 79 y.o. male with a medical diagnosis of Neurologic deficit due to acute ischemic stroke. At this time, patient is functioning at their prior level of function and does not require further acute OT services.     Plan:     During this hospitalization, patient does not require further acute OT services.  Please re-consult if situation changes.    Plan of Care Reviewed with: patient    Subjective     Chief Complaint: none  Patient/Family Comments/goals: none stated    Occupational Profile:  Living Environment: Lives with spouse SSH no steps  Previous level of function: Independent, active, driving   Equipment Used at home: built-in shower seat  Assistance upon Discharge: wife    Pain/Comfort:  Pain Rating 1: 0/10  Pain Rating Post-Intervention 1: 0/10    Objective:     Communicated with: nurse prior to session.  Patient found supine with telemetry upon OT entry to room.    General Precautions: Standard, fall, diabetic  Respiratory Status: Room air     Occupational Performance:    Bed Mobility:    Patient completed Supine to Sit with independence  Patient completed Sit to Supine with independence    Activities of Daily Living:  Pt declined; reported having no deficits     Cognitive/Visual Perceptual:  Cognitive/Psychosocial Skills:     -       Oriented to: Person, Place, Time, and Situation   -       Follows Commands/attention:Follows multistep  commands  -       Communication: clear/fluent  -       Memory: No Deficits noted    Physical Exam:  Upper Extremity Range of Motion:     -       Right Upper Extremity: WFL  -        Left Upper Extremity: WFL  Upper Extremity Strength:    -       Right Upper Extremity: WFL  -       Left Upper Extremity: WFL   Strength:    -       Right Upper Extremity: WFL  -       Left Upper Extremity: WFL  Fine Motor Coordination:    -       Intact  Gross motor coordination:   WFL    AMPAC 6 Click ADL:  AMPAC Total Score: 24    Treatment & Education:  Pt educated on OT role/discharge    Patient left HOB elevated with all lines intact, call button in reach, and bed alarm on      History:     Past Medical History:   Diagnosis Date    Diabetes mellitus, type 2     DM type 2 without retinopathy     Hypertension     Kidney stone          Past Surgical History:   Procedure Laterality Date    APPENDECTOMY  5/30/2020    Procedure: APPENDECTOMY;  Surgeon: Eusebio Gil III, MD;  Location: [facility];  Service: General;;    arm fracture surgery      FRACTURE SURGERY      Left arm    LAPAROSCOPIC APPENDECTOMY N/A 5/30/2020    Procedure: APPENDECTOMY, LAPAROSCOPIC VS OPEN;  Surgeon: Eusebio Gil III, MD;  Location: [facility];  Service: General;  Laterality: N/A;    Open Appendectomy w/ partial cecectomy   05/31/2020    Dr. Gil        Time Tracking:     OT Date of Treatment: 11/18/24  OT Start Time: 1049  OT Stop Time: 1057  OT Total Time (min): 8 min    Billable Minutes:Evaluation 08 11/18/2024

## 2024-11-18 NOTE — PLAN OF CARE
Problem: Stroke, Ischemic (Includes Transient Ischemic Attack)  Goal: Optimal Coping  Outcome: Progressing     Problem: Stroke, Ischemic (Includes Transient Ischemic Attack)  Goal: Effective Bowel Elimination  Outcome: Progressing     Problem: Stroke, Ischemic (Includes Transient Ischemic Attack)  Goal: Optimal Cerebral Tissue Perfusion  Outcome: Progressing     Problem: Stroke, Ischemic (Includes Transient Ischemic Attack)  Goal: Optimal Cognitive Function  Outcome: Progressing

## 2024-11-18 NOTE — PT/OT/SLP EVAL
Physical Therapy Evaluation and Discharge Note    Patient Name:  Ruy Ramos Jr.   MRN:  [id number]    Recommendations:     Discharge Recommendations: No Therapy Indicated  Discharge Equipment Recommendations:     Barriers to discharge: None    Assessment:     Ruy Ramos Jr. is a 79 y.o. male admitted with a medical diagnosis of Neurologic deficit due to acute ischemic stroke. .  At this time, patient is functioning at their prior level of function and does not require further acute PT services.     Recent Surgery: * No surgery found *      Plan:     During this hospitalization, patient does not require further acute PT services.  Please re-consult if situation changes.      Subjective     Chief Complaint: no complaints. No longer dizzy  Patient/Family Comments/goals: home  Pain/Comfort:  Pain Rating 1: 0/10  Pain Rating Post-Intervention 1: 0/10    Patients cultural, spiritual, Adventist conflicts given the current situation: no    Living Environment:  Lives w spouse in , no EN[employer], drives,active  Prior to admission, patients level of function was independent.  Equipment used at home:  built in shower seat.  DME owned (not currently used): none.  Upon discharge, patient will have assistance from spouse.    Objective:     Communicated with nurse prior to session.  Patient found HOB elevated with telemetry, peripheral IV upon PT entry to room.    General Precautions: Standard, fall    Orthopedic Precautions:N/A   Braces: N/A  Respiratory Status: Room air    Exams:  Cognitive Exam:  Patient is oriented to Person, Place, Time, and Situation  Fine Motor Coordination:    -       Intact  Gross Motor Coordination:  WFL  Sensation:    -       Intact  RUE Strength: WNL  LUE Strength: WNL  RLE Strength: WNL  LLE Strength: WNL    Functional Mobility:  Bed Mobility:     Supine to Sit: independence  Sit to Supine: independence  Transfers:     Sit to Stand:  independence with no AD  Gait: 350 ft without AD, good  shanika, steady    AM-PAC 6 CLICK MOBILITY  Total Score:24       Treatment and Education:  Pt and spouse educated in PT roles and goals, fall prevention, use of call light    AM-PAC 6 CLICK MOBILITY  Total Score:24     Patient left HOB elevated with all lines intact and call button in reach.    GOALS:   Multidisciplinary Problems       Physical Therapy Goals       Not on file                    History:     Past Medical History:   Diagnosis Date    Diabetes mellitus, type 2     DM type 2 without retinopathy     Hypertension     Kidney stone        Past Surgical History:   Procedure Laterality Date    APPENDECTOMY  5/30/2020    Procedure: APPENDECTOMY;  Surgeon: Eusebio Gil III, MD;  Location: [facility];  Service: General;;    arm fracture surgery      FRACTURE SURGERY      Left arm    LAPAROSCOPIC APPENDECTOMY N/A 5/30/2020    Procedure: APPENDECTOMY, LAPAROSCOPIC VS OPEN;  Surgeon: Eusebio Gil III, MD;  Location: [facility];  Service: General;  Laterality: N/A;    Open Appendectomy w/ partial cecectomy   05/31/2020    Dr. Gil        Time Tracking:     PT Received On: 11/18/24  PT Start Time: 0930     PT Stop Time: 0943  PT Total Time (min): 13 min     Billable Minutes: Evaluation 13      11/18/2024

## 2024-11-18 NOTE — PROGRESS NOTES
"[facility]  Department of Neurology  Progress Note  Date: 2024 10:25 AM          Patient Name: Ruy Ramos Jr.   MRN: [id number]   : 1945    AGE: 79 y.o.    LOS: 1 days Hospital Day: 2  Admit date: 2024  8:27 AM         2024: No acute events overnight. Patient was seen and examined by me this morning. Neuro exam is normal.  I discussed with the wife who states that he had a history of AFib only when he had appendectomy done and he was on anticoagulation with Eliquis for about 3 months.  No further history of AFib was documented (however patient did not have any loop recorder).  MRI brain now shows small left pontine infarct.    Vitals:  Patient Vitals for the past 24 hrs:   BP Temp Temp src Pulse Resp SpO2 Height Weight   24 0720 (!) 157/115 98.1 °F (36.7 °C) Oral (!) 59 -- 96 % -- --   24 0618 -- -- -- -- -- 97 % -- --   24 0341 131/69 97.8 °F (36.6 °C) Oral 60 17 97 % -- --   24 0253 -- -- -- (!) 55 -- -- -- --   24 2348 -- -- -- 62 -- -- -- --   24 2327 130/71 97.8 °F (36.6 °C) Oral 67 17 (!) 93 % -- --   24 -- -- -- 62 17 95 % -- --   24 1935 (!) 142/73 98.3 °F (36.8 °C) Oral 63 17 95 % -- --   24 -- -- -- 64 -- -- -- --   24 -- -- -- 64 -- -- -- --   24 172 -- -- -- -- -- -- 5' 7" (1.702 m) 80.1 kg (176 lb 9.4 oz)   24 1719 (!) 160/76 98 °F (36.7 °C) Oral 62 -- 95 % -- --   24 1619 -- -- -- 64 (!) 22 95 % -- --   24 1613 -- -- -- 62 20 95 % -- --   24 1108 -- -- -- (!) 56 20 98 % -- --   24 1103 (!) 170/81 -- -- (!) 54 20 96 % -- --   24 1058 -- -- -- (!) 56 16 99 % -- --   24 1048 -- -- -- (!) 54 12 96 % -- --   24 1036 -- -- -- (!) 53 12 97 % -- --   24 1031 (!) 187/76 -- -- (!) 54 15 97 % -- --     PHYSICAL EXAM:     GENERAL APPEARANCE: Well-developed, well-nourished male in no acute distress.  HEENT: Normocephalic and " "atraumatic. PERRL. Oropharynx unremarkable.  PULM: Comfortable on room air.  CV: RRR.  ABDOMEN: Soft, nontender.  EXTREMITIES: No signs of vascular compromise. Pulses present. No cyanosis, clubbing or edema.  SKIN: Clear; no rashes, lesions or skin breaks in exposed areas.      NEURO:   MENTAL STATUS: Patient awake and oriented to time, place, and person. Affect normal.  CRANIAL NERVES II-XII: Pupils equal, round and reactive to light. Extraocular movements full and intact. No facial asymmetry.  MOTOR: Normal bulk. Tone normal and symmetrical throughout.  No abnormal movements. No tremor.   Strength 5/5 throughout unless specified below.  REFLEXES: DTRs 2+; normal and symmetric throughout.   SENSATION: Sensation grossly intact to fine touch.  COORDINATION: Finger-to-nose normal for age and symmetric.  STATION: Romberg deferred.  GAIT: Deferred.    CURRENT SCHEDULED MEDICATIONS:   aspirin  325 mg Oral Daily    atorvastatin  40 mg Oral QHS    enoxparin  40 mg Subcutaneous Daily    ezetimibe  10 mg Oral QHS    famotidine  20 mg Oral Daily    folic acid-vit B6-vit B12   Oral Daily    multivitamin  1 tablet Oral Daily     CURRENT INFUSIONS:    DATA:  Recent Labs   Lab 11/17/24  0853 11/18/24  0518    140   K 4.0 4.1    109   CO2 22* 23   BUN 20 26*   CREATININE 1.6* 1.6*   * 155*   CALCIUM 9.5 8.6*   MG  --  2.1   AST 20 16   ALT 20 16     Recent Labs   Lab 11/17/24  0853 11/18/24  0518   WBC 7.43 7.28   HGB 15.9 14.0   HCT 47.5 43.3    170     No results found for: "PROTEINCSF", "GLUCCSF", "WBCCSF", "RBCCSF", "LYMPHCSF", "PMNCSF"  Hemoglobin A1C   Date Value Ref Range Status   11/18/2024 7.3 (H) 4.5 - 6.2 % Final     Comment:     According to ADA guidelines, hemoglobin A1C <7.0% represents  optimal control in non-pregnant diabetic patients.  Different  metrics may apply to specific populations.   Standards of Medical Care in Diabetes - 2016.    For the purpose of screening for the presence of " diabetes:  <5.7%     Consistent with the absence of diabetes  5.7-6.4%  Consistent with increasing risk for diabetes   (prediabetes)  >or=6.5%  Consistent with diabetes    Currently no consensus exists for use of hemoglobin A1C  for diagnosis of diabetes for children.     06/20/2024 7.2 (H) <5.7 % of total Hgb Final     Comment:     For someone without known diabetes, a hemoglobin A1c  value of 6.5% or greater indicates that they may have   diabetes and this should be confirmed with a follow-up   test.     For someone with known diabetes, a value <7% indicates   that their diabetes is well controlled and a value   greater than or equal to 7% indicates suboptimal   control. A1c targets should be individualized based on   duration of diabetes, age, comorbid conditions, and   other considerations.     Currently, no consensus exists regarding use of  hemoglobin A1c for diagnosis of diabetes for children.         This test was performed on the Roche mayra c503 platform.  Effective 11/13/23, a change in test platforms from the  [employer]  to the Roche mayra c503 may have shifted  HbA1c results compared to historical results.  Based on laboratory validation testing conducted at  [facility], the Roche platform relative to the Abbott  platform had an average increase in HbA1c value of  < or = 0.3%. This difference is within accepted   variability established by the National Glycohemoglobin  Standardization Program. Note that not all individuals  will have had a shift in their results and direct  comparisons between historical and current results for  testing conducted on different platforms is not  recommended.         06/14/2023 6.6 (H) 4.0 - 5.6 % Final     Comment:     ADA Screening Guidelines:  5.7-6.4%  Consistent with prediabetes  >or=6.5%  Consistent with diabetes    High levels of fetal hemoglobin interfere with the HbA1C  assay. Heterozygous hemoglobin variants (HbS, HgC, etc)do  not significantly interfere with  this assay.   However, presence of multiple variants may affect accuracy.              I have personally reviewed and interpreted the pertinent imaging and lab results.  Imaging Results              MRI Brain Without Contrast (Final result)  Result time 11/17/24 11:53:42      Final result by Krish Centeno MD (11/17/24 11:53:42)                   Impression:      Equivocal small (5 mm) acute ischemic infarct involving the leftward aspect of the posterior allan.    Otherwise, negative for acute intracranial pathology.    Mild white matter microangiopathic changes.      Electronically signed by: Krish Centeno  Date:    11/17/2024  Time:    11:53               Narrative:    EXAMINATION:  MRI BRAIN WITHOUT CONTRAST    CLINICAL HISTORY:  Dizziness, persistent/recurrent, cardiac or vascular cause suspected;    TECHNIQUE:  Multisequence, multiplanar imaging through the brain performed without IV contrast.    COMPARISON:  CT head 11/17/2024    FINDINGS:  Diffusion-weighted imaging is negative for cerebral or cerebellar ischemia or focal lesion.  Along the posterior aspect of the allan, to the left of midline, there is a 5 mm linear focus of hyperintensity on the diffusion-weighted sequence, which appears to show corresponding hypointensity on the ADC map.  There is equivocal small T2/FLAIR corresponding signal on routine image sequences and this could potentially represent a tiny acute ischemic infarction.    No intracranial hemorrhage.  No midline shift or mass effect.  Ventricles and sulci are normal in size for age.  Mild periventricular and deep white matter T2/FLAIR hyperintensity, most likely on the basis of microangiopathic change.  Cerebellar hemispheres are unremarkable.  Major intracranial T2 flow voids appear patent.    Mastoid air cells are clear.  Paranasal sinuses are clear.  No bone marrow signal abnormality.  Pituitary gland is within normal limits.                                       MRA Brain without  contrast (Final result)  Result time 11/17/24 11:57:13      Final result by Krish Centeno MD (11/17/24 11:57:13)                   Impression:      Negative for large vessel intracranial arterial occlusion.      Electronically signed by: Krish Centeno  Date:    11/17/2024  Time:    11:57               Narrative:    EXAMINATION:  MRA BRAIN WITHOUT CONTRAST    CLINICAL HISTORY:  Diplopia;Dizziness, persistent/recurrent, cardiac or vascular cause suspected;    TECHNIQUE:  3D TOF MRA without contrast was performed, with maximum intensity projections reviewed.    COMPARISON:  None    FINDINGS:  The internal carotid and vertebrobasilar arteries are patent, without evidence of aneurysm, significant stenosis, or vascular malformation.  The left vertebral artery is dominant, noting tortuosity of intracranial V4 segment.  The right vertebral artery terminates as the right PICA, a normal variant.  The anterior, middle and posterior cerebral arteries are patent and demonstrate no aneurysm, significant stenosis or vascular malformation.                                       US Carotid Bilateral (Final result)  Result time 11/17/24 10:15:07      Final result by Krish Centeno MD (11/17/24 10:15:07)                   Impression:      No Doppler findings of hemodynamically significant carotid arterial stenosis or vascular occlusion.    Patent vertebral arteries with antegrade flow bilaterally.      Electronically signed by: Krish Centeno  Date:    11/17/2024  Time:    10:15               Narrative:    EXAMINATION:  US CAROTID BILATERAL    CLINICAL HISTORY:  diplopia, vertigo;    TECHNIQUE:  Grayscale, color and spectral Doppler analysis was performed.    CMS Mandated Quality [id number]    Criteria for stenosis is based upon the Society of Radiologist in Ultrasound Consensus Conference, 2003 (Radiology, November 2003, 229, [phone]). This is in accordance with NASCET criteria.    COMPARISON:  None    FINDINGS:  Right:  Peak systolic velocity in the CCA is 67.7 cm/sec, and peak systolic velocity within the ICA 35.8 cm/sec, with ICA/CCA ratio of less than 2.  Maximum end diastolic velocities are within normal limits. ECA is patent.    Left: Peak systolic velocity in the CCA is 70.2 cm/sec, and peak systolic velocity within the .0 cm/sec, with ICA/CCA ratio of less than 2.  Maximum end diastolic velocities are within normal limits.  ECA is patent.    The vertebral arteries are patent, with normal waveforms, and normal antegrade flow bilaterally.                                       CT HEAD FOR STROKE (Final result)  Result time 11/17/24 08:57:56      Final result by Krish Centeno MD (11/17/24 08:57:56)                   Impression:      No CT evidence of acute intracranial pathology.    Findings called to Dr. Craig at the time of dictation.      Electronically signed by: Krish Centeno  Date:    11/17/2024  Time:    08:57               Narrative:    EXAMINATION:  CT HEAD FOR STROKE    CLINICAL HISTORY:  Neuro deficit, acute, stroke suspected;    TECHNIQUE:  Axial imaging obtained through the brain without IV contrast    CMS Mandated Quality Data-CT Radiation Dose-436    All CT scans at this facility dose modulation, iterative reconstruction, and or weight-based dosing when appropriate to reduce radiation dose to as low as reasonably achievable.    COMPARISON:  None    FINDINGS:  Negative for acute intracranial hemorrhage, midline shift, or mass effect.  Ventricles and sulci are normal in size.  Gray-white differentiation is maintained.  Cerebellar hemispheres and brainstem are unremarkable.  Atherosclerotic calcification of intracranial carotid arteries.    No calvarial lesion or fracture.  Mastoid air cells are clear.  Mucosal thickening involving frontal sinuses and left maxillary sinus.                                           ASSESSMENT AND PLAN:     Acute ischemic infarct   Paroxysmal atrial  fibrillation    Plan  Etiology of infarct could likely be small-vessel disease.  Patient has a prior history of atrial fibrillation and given now has a ischemic infarct, will likely benefit from anticoagulation for atrial fibrillation.    Start with Eliquis 5 mg b.i.d. and continue aspirin 81 mg and Lipitor 40 mg daily for stroke prevention.  I explained the risks versus benefits(risks of bleeding intracranially and systemic bleeding explained) with the family and they agreed for anticoagulation.   Recommend outpatient loop recorder to assess burden of AFib.    Follow-up with Cardiology outpatient.    Risk factor stratification with 2D echo, hemoglobin A1c and lipid panel.    PT OT evaluate and treat.    Outpatient neurology follow-up         Gregory Schultz MD  Neurology/vascular Neurology  Date of Service: 11/18/2024  10:25 AM    Please note: This note was transcribed using voice recognition software. Because of this technology there are often uinintended grammatical, spelling, and other transcription errors. Please disregard these errors.

## 2024-11-18 NOTE — SUBJECTIVE & OBJECTIVE
Interval History: Patient states he feels okay    Review of Systems   Constitutional:  Negative for activity change, appetite change, chills and fever.   HENT:  Negative for congestion, ear pain, nosebleeds and sinus pain.    Eyes:  Positive for visual disturbance. Negative for discharge and itching.   Respiratory:  Negative for apnea, cough, chest tightness and shortness of breath.    Cardiovascular:  Negative for chest pain, palpitations and leg swelling.   Gastrointestinal:  Positive for nausea. Negative for abdominal distention, abdominal pain and vomiting.   Genitourinary:  Negative for difficulty urinating, dysuria, flank pain and frequency.   Musculoskeletal:  Negative for arthralgias, back pain, joint swelling and myalgias.   Skin:  Negative for color change, pallor and rash.   Neurological:  Positive for numbness. Negative for dizziness, weakness, light-headedness and headaches.   Psychiatric/Behavioral:  Negative for agitation, behavioral problems, confusion and suicidal ideas.      Objective:     Vital Signs (Most Recent):  Temp: 97.4 °F (36.3 °C) (11/18/24 1156)  Pulse: 62 (11/18/24 1156)  Resp: 17 (11/18/24 1156)  BP: (!) 168/83 (11/18/24 1156)  SpO2: 97 % (11/18/24 1156) Vital Signs (24h Range):  Temp:  [97.4 °F (36.3 °C)-98.3 °F (36.8 °C)] 97.4 °F (36.3 °C)  Pulse:  [55-76] 62  Resp:  [17-22] 17  SpO2:  [93 %-97 %] 97 %  BP: (130-168)/() 168/83     Weight: 80.1 kg (176 lb 9.4 oz)  Body mass index is 27.66 kg/m².    Intake/Output Summary (Last 24 hours) at 11/18/2024 1316  Last data filed at 11/18/2024 1235  Gross per 24 hour   Intake 460 ml   Output 400 ml   Net 60 ml         Physical Exam  Vitals reviewed.   Constitutional:       General: He is not in acute distress.     Appearance: Normal appearance. He is normal weight. He is not ill-appearing.   HENT:      Head: Normocephalic and atraumatic.      Nose: Nose normal.      Mouth/Throat:      Mouth: Mucous membranes are moist.      Pharynx:  Oropharynx is clear.   Eyes:      General: No scleral icterus.     Extraocular Movements: Extraocular movements intact.      Conjunctiva/sclera: Conjunctivae normal.      Pupils: Pupils are equal, round, and reactive to light.   Cardiovascular:      Rate and Rhythm: Normal rate and regular rhythm.      Pulses: Normal pulses.      Heart sounds: Normal heart sounds. No murmur heard.     No gallop.   Pulmonary:      Effort: Pulmonary effort is normal. No respiratory distress.      Breath sounds: Normal breath sounds. No wheezing, rhonchi or rales.   Chest:      Chest wall: No tenderness.   Abdominal:      General: Abdomen is flat. There is no distension.      Palpations: Abdomen is soft.      Tenderness: There is no abdominal tenderness.   Musculoskeletal:         General: No swelling or tenderness.      Cervical back: Normal range of motion and neck supple. No rigidity or tenderness.      Right lower leg: No edema.      Left lower leg: No edema.   Skin:     General: Skin is warm and dry.   Neurological:      General: No focal deficit present.      Mental Status: He is alert and oriented to person, place, and time. Mental status is at baseline.      Motor: No weakness.   Psychiatric:         Mood and Affect: Mood normal.         Behavior: Behavior normal.         Thought Content: Thought content normal.             Significant Labs: All pertinent labs within the past 24 hours have been reviewed.    Significant Imaging: I have reviewed all pertinent imaging results/findings within the past 24 hours.

## 2024-11-18 NOTE — NURSING
Patient and spouse given discharge instructions. Both verbalized understanding of follow up appointments and medication changes. Week supply of medications sent to local pharmacy, with monthly supply sent to mail order per patient request from Dr. Mejía. PIV removed from left AC without difficulty, catheter tip intact. Telemetry monitor removed and returned to monitor room. Patient to discharge home via private vehicle.

## 2024-11-19 ENCOUNTER — PATIENT OUTREACH (OUTPATIENT)
Dept: FAMILY MEDICINE | Facility: CLINIC | Age: 79
End: 2024-11-19
Payer: MEDICARE

## 2024-11-19 ENCOUNTER — TELEPHONE (OUTPATIENT)
Dept: FAMILY MEDICINE | Facility: CLINIC | Age: 79
End: 2024-11-19
Payer: MEDICARE

## 2024-11-19 NOTE — TELEPHONE ENCOUNTER
----- Message from Camryn sent at 11/19/2024  9:06 AM CST -----  Contact: Andreia  Pt needs to reschedule his hospital follow up appt for 12/3. Andreia @[phone]

## 2024-11-19 NOTE — TELEPHONE ENCOUNTER
----- Message from Nurse Singleton sent at 11/19/2024  1:53 PM CST -----  Call patient - needs post-hospital phone call within 2 business days and hospital follow up visit scheduled within 7-14 days.

## 2024-11-19 NOTE — PROGRESS NOTES
Discharge Information     Discharge Date:   11/18/2024    Primary Discharge Diagnosis:  Neurologic deficit due to acute ischemic stroke        Discharge Summary:  Reviewed      Medication & Order Review     Were medication changes made or new medications added?   Yes    If so, has the patient filled the prescriptions?  Yes     Was Home Health ordered? No    If so, has Home Health contacted patient and/or initiated services?  No    Name of Home Health Agency? N/A    Durable Medical Equipment ordered?  No     If so, has the DME provider contacted patient and delivered equipment?  N/A    Follow Up               Any problems since discharge? No    How is the patient feeling since returning home?      Have you set up recommended follow up appointments?  (cardiology, surgery, etc.)    Schedule Hospital Follow-up appointment within 7-14 days (preferably 7).      Notes:  Spoke to pts wife and she stated pt is doing good. Pt is is tired from being in the hospital. Pt is scheduled to Follow up with  and Dr. Crisostomo. Pt can not come in for a HFU with Dr. Bunch due to having surgery. Pt rescheduled for a regular appointment with Dr. Julio C Castillo

## 2024-11-19 NOTE — TELEPHONE ENCOUNTER
----- Message from Camryn sent at 11/19/2024  9:06 AM CST -----  Contact: Andreia  Pt needs to reschedule his hospital follow up appt for 12/3. Andreia @[phone]

## 2024-11-23 LAB
OHS QRS DURATION: 82 MS
OHS QTC CALCULATION: 446 MS

## 2024-11-26 NOTE — PLAN OF CARE
Through communication with [facility], the Inpatient order is being changed to observation as the payer will not authorize Inpatient and the facility agrees not to appeal or challenge the change in status. The account will be changed to Observation for billing purposes.       Deborah Conley MD  Physician Advisor

## 2024-12-17 ENCOUNTER — OFFICE VISIT (OUTPATIENT)
Dept: FAMILY MEDICINE | Facility: CLINIC | Age: 79
End: 2024-12-17
Payer: MEDICARE

## 2024-12-17 VITALS
BODY MASS INDEX: 28.09 KG/M2 | OXYGEN SATURATION: 98 % | HEIGHT: 67 IN | DIASTOLIC BLOOD PRESSURE: 70 MMHG | HEART RATE: 70 BPM | WEIGHT: 179 LBS | SYSTOLIC BLOOD PRESSURE: 118 MMHG

## 2024-12-17 DIAGNOSIS — I10 PRIMARY HYPERTENSION: ICD-10-CM

## 2024-12-17 DIAGNOSIS — I48.0 PAROXYSMAL ATRIAL FIBRILLATION: ICD-10-CM

## 2024-12-17 DIAGNOSIS — E11.8 CONTROLLED TYPE 2 DIABETES MELLITUS WITH COMPLICATION, WITHOUT LONG-TERM CURRENT USE OF INSULIN: ICD-10-CM

## 2024-12-17 DIAGNOSIS — Z98.890 HISTORY OF CAROTID ENDARTERECTOMY: ICD-10-CM

## 2024-12-17 DIAGNOSIS — R29.818 NEUROLOGIC DEFICIT DUE TO ACUTE ISCHEMIC STROKE: Primary | ICD-10-CM

## 2024-12-17 DIAGNOSIS — E78.2 MIXED HYPERLIPIDEMIA: ICD-10-CM

## 2024-12-17 DIAGNOSIS — E11.22 TYPE 2 DIABETES MELLITUS WITH STAGE 3B CHRONIC KIDNEY DISEASE, WITHOUT LONG-TERM CURRENT USE OF INSULIN: ICD-10-CM

## 2024-12-17 DIAGNOSIS — E08.21 DIABETIC NEPHROPATHY ASSOCIATED WITH DIABETES MELLITUS DUE TO UNDERLYING CONDITION: ICD-10-CM

## 2024-12-17 DIAGNOSIS — N18.32 STAGE 3B CHRONIC KIDNEY DISEASE: ICD-10-CM

## 2024-12-17 DIAGNOSIS — N20.0 NEPHROLITHIASIS: ICD-10-CM

## 2024-12-17 DIAGNOSIS — I77.9 CAROTID ARTERY DISEASE, UNSPECIFIED LATERALITY, UNSPECIFIED TYPE: ICD-10-CM

## 2024-12-17 DIAGNOSIS — N18.32 TYPE 2 DIABETES MELLITUS WITH STAGE 3B CHRONIC KIDNEY DISEASE, WITHOUT LONG-TERM CURRENT USE OF INSULIN: ICD-10-CM

## 2024-12-17 DIAGNOSIS — H53.2 TRANSIENT DIPLOPIA: ICD-10-CM

## 2024-12-17 DIAGNOSIS — R97.20 ELEVATED PSA: ICD-10-CM

## 2024-12-17 DIAGNOSIS — I63.9 NEUROLOGIC DEFICIT DUE TO ACUTE ISCHEMIC STROKE: Primary | ICD-10-CM

## 2024-12-17 PROCEDURE — 1160F RVW MEDS BY RX/DR IN RCRD: CPT | Mod: CPTII,S$GLB,, | Performed by: FAMILY MEDICINE

## 2024-12-17 PROCEDURE — 1101F PT FALLS ASSESS-DOCD LE1/YR: CPT | Mod: CPTII,S$GLB,, | Performed by: FAMILY MEDICINE

## 2024-12-17 PROCEDURE — 1159F MED LIST DOCD IN RCRD: CPT | Mod: CPTII,S$GLB,, | Performed by: FAMILY MEDICINE

## 2024-12-17 PROCEDURE — 3288F FALL RISK ASSESSMENT DOCD: CPT | Mod: CPTII,S$GLB,, | Performed by: FAMILY MEDICINE

## 2024-12-17 PROCEDURE — 3078F DIAST BP <80 MM HG: CPT | Mod: CPTII,S$GLB,, | Performed by: FAMILY MEDICINE

## 2024-12-17 PROCEDURE — 99214 OFFICE O/P EST MOD 30 MIN: CPT | Mod: S$GLB,,, | Performed by: FAMILY MEDICINE

## 2024-12-17 PROCEDURE — 3074F SYST BP LT 130 MM HG: CPT | Mod: CPTII,S$GLB,, | Performed by: FAMILY MEDICINE

## 2024-12-17 PROCEDURE — 1111F DSCHRG MED/CURRENT MED MERGE: CPT | Mod: CPTII,S$GLB,, | Performed by: FAMILY MEDICINE

## 2024-12-17 PROCEDURE — 2023F DILAT RTA XM W/O RTNOPTHY: CPT | Mod: CPTII,S$GLB,, | Performed by: FAMILY MEDICINE

## 2024-12-17 RX ORDER — LISINOPRIL 5 MG/1
5 TABLET ORAL DAILY
Qty: 90 TABLET | Refills: 3 | Status: SHIPPED | OUTPATIENT
Start: 2024-12-17 | End: 2025-12-17

## 2024-12-17 NOTE — PROGRESS NOTES
SUBJECTIVE:    Patient ID: Ruy Ramos Jr. is a 79 y.o. male.    Chief Complaint: Diabetes (Brought med list, no complaints, abc )    Diabetes  Pertinent negatives for hypoglycemia include no confusion, dizziness, headaches, nervousness/anxiousness or seizures. Pertinent negatives for diabetes include no chest pain, no fatigue, no polydipsia, no polyuria and no weakness.       History of Present Illness    CHIEF COMPLAINT:  Ruy presents today for follow-up after a stroke in November.    HISTORY OF PRESENT ILLNESS:  In November, he experienced a stroke presenting with double vision and dizziness. The double vision resolved within 10-12 hours. He denies any loss of arm or leg function. CTs and MRIs were performed at that time.    CARDIOVASCULAR:  He is currently wearing a Zio monitor for 14 days to evaluate for atrial fibrillation. He has a history of AFib during appendicitis in 2020, which was treated with Eliquis for three months.    DIABETES:  A1C was 7.3 in November with most recent blood sugar reading of 154. He exercises 3 times per week, walking nearly two miles each session.    MEDICATIONS:  Current medications include Atorvastatin 80 mg (switched from Pravastatin), two baby aspirins daily, Ezetimibe 10 mg for cholesterol management, and Metoprolol 25 mg. He has discontinued Eliquis.    SURGICAL HISTORY:  History of right-sided Roto-Rooter procedure (year unknown) and appendectomy in 2020.      ROS:  Constitutional: -appetite change, -chills, -fatigue, -fever, -unexpected weight change  HENT: -ear pain, -trouble swallowing  Eyes: -pain, -discharge, -visual disturbance  Respiratory: -apnea, -cough, -shortness of breath, -wheezing  Cardiovascular: -chest pain, -leg swelling  Gastrointestinal: -abdominal pain, -blood in stool, -constipation, -diarrhea, -nausea, -vomiting, -reflux  Endocrine: -cold intolerance, -heat intolerance, -polydipsia  Genitourinary: -bladder incontinence, -dysuria,  -erectile dysfunction, -frequency, -hematuria, -testicular pain, -urgency, -nocturia  Musculoskeletal: -gait problem, -joint swelling, -myalgia  Neurological: +dizziness, -seizures, -numbness  Psychiatric/Behavioral: -agitation, -hallucinations, -nervous, -anxiety symptoms         Admission on 11/17/2024, Discharged on 11/18/2024   Component Date Value Ref Range Status    WBC 11/17/2024 7.43  3.90 - 12.70 K/uL Final    RBC 11/17/2024 5.20  4.60 - 6.20 M/uL Final    Hemoglobin 11/17/2024 15.9  14.0 - 18.0 g/dL Final    Hematocrit 11/17/2024 47.5  40.0 - 54.0 % Final    MCV 11/17/2024 91  82 - 98 fL Final    MCH 11/17/2024 30.6  27.0 - 31.0 pg Final    MCHC 11/17/2024 33.5  32.0 - 36.0 g/dL Final    RDW 11/17/2024 14.5  11.5 - 14.5 % Final    Platelets 11/17/2024 197  150 - 450 K/uL Final    MPV 11/17/2024 10.3  9.2 - 12.9 fL Final    Immature Granulocytes 11/17/2024 0.9 (H)  0.0 - 0.5 % Final    Gran # (ANC) 11/17/2024 5.0  1.8 - 7.7 K/uL Final    Immature Grans (Abs) 11/17/2024 0.07 (H)  0.00 - 0.04 K/uL Final    Lymph # 11/17/2024 1.7  1.0 - 4.8 K/uL Final    Mono # 11/17/2024 0.6  0.3 - 1.0 K/uL Final    Eos # 11/17/2024 0.1  0.0 - 0.5 K/uL Final    Baso # 11/17/2024 0.08  0.00 - 0.20 K/uL Final    nRBC 11/17/2024 0  0 /100 WBC Final    Gran % 11/17/2024 67.1  38.0 - 73.0 % Final    Lymph % 11/17/2024 22.3  18.0 - 48.0 % Final    Mono % 11/17/2024 7.8  4.0 - 15.0 % Final    Eosinophil % 11/17/2024 0.8  0.0 - 8.0 % Final    Basophil % 11/17/2024 1.1  0.0 - 1.9 % Final    Differential Method 11/17/2024 Automated   Final    Sodium 11/17/2024 140  136 - 145 mmol/L Final    Potassium 11/17/2024 4.0  3.5 - 5.1 mmol/L Final    Chloride 11/17/2024 106  95 - 110 mmol/L Final    CO2 11/17/2024 22 (L)  23 - 29 mmol/L Final    Glucose 11/17/2024 180 (H)  70 - 110 mg/dL Final    BUN 11/17/2024 20  8 - 23 mg/dL Final    Creatinine 11/17/2024 1.6 (H)  0.5 - 1.4 mg/dL Final    Calcium 11/17/2024 9.5  8.7 - 10.5 mg/dL Final     Total Protein 11/17/2024 7.5  6.0 - 8.4 g/dL Final    Albumin 11/17/2024 4.6  3.5 - 5.2 g/dL Final    Total Bilirubin 11/17/2024 0.5  0.1 - 1.0 mg/dL Final    Alkaline Phosphatase 11/17/2024 105  55 - 135 U/L Final    AST 11/17/2024 20  10 - 40 U/L Final    ALT 11/17/2024 20  10 - 44 U/L Final    eGFR 11/17/2024 43.0 (A)  >60 mL/min/1.73 m^2 Final    Anion Gap 11/17/2024 12  8 - 16 mmol/L Final    Prothrombin Time 11/17/2024 10.9  9.0 - 12.5 sec Final    INR 11/17/2024 1.0  0.8 - 1.2 Final    TSH 11/17/2024 2.630  0.340 - 5.600 uIU/mL Final    QRS Duration 11/17/2024 82  ms Final    [facility] QTC Calculation 11/17/2024 446  ms Final    Cholesterol 11/17/2024 189  120 - 199 mg/dL Final    Triglycerides 11/17/2024 327 (H)  30 - 150 mg/dL Final    HDL 11/17/2024 45  40 - 75 mg/dL Final    LDL Cholesterol 11/17/2024 78.6  63.0 - 159.0 mg/dL Final    HDL/Cholesterol Ratio 11/17/2024 23.8  20.0 - 50.0 % Final    Total Cholesterol/HDL Ratio 11/17/2024 4.2  2.0 - 5.0 Final    Non-HDL Cholesterol 11/17/2024 144  mg/dL Final    POCT Glucose 11/17/2024 186 (H)  70 - 110 mg/dL Final    Lipase 11/17/2024 22  4 - 60 U/L Final    Troponin I High Sensitivity 11/17/2024 5.8  0.0 - 14.9 pg/mL Final    POC Creatinine 11/17/2024 1.8 (H)  0.5 - 1.4 mg/dL Final    Sample 11/17/2024 VENOUS   Final    A4C EF 11/17/2024 70  % Final    LVOT stroke volume 11/17/2024 73.5  cm3 Final    LVIDd 11/17/2024 3.6  3.5 - 6.0 cm Final    LV Systolic Volume 11/17/2024 20.16  mL Final    LV Systolic Volume Index 11/17/2024 10.4  mL/m2 Final    LVIDs 11/17/2024 2.4  2.1 - 4.0 cm Final    LV ESV A2C 11/17/2024 49.60  mL Final    LV Diastolic Volume 11/17/2024 54.43  mL Final    LV ESV A4C 11/17/2024 33.20  mL Final    LV Diastolic Volume Index 11/17/2024 28.20  mL/m2 Final    LV EDV A4C 11/17/2024 88.80  mL Final    Left Ventricular End Systolic Volu* 11/17/2024 20.16  mL Final    Left Ventricular End Diastolic Vol* 11/17/2024 54.43  mL Final    IVS 11/17/2024  1.2 (A)  0.6 - 1.1 cm Final    LVOT diameter 11/17/2024 2.0  cm Final    LVOT area 11/17/2024 3.1  cm2 Final    FS 11/17/2024 33.3  28 - 44 % Final    Left Ventricle Relative Wall Thick* 11/17/2024 0.56  cm Final    PW 11/17/2024 1.0  0.6 - 1.1 cm Final    LV mass 11/17/2024 124.1  g Final    LV Mass Index 11/17/2024 64.3  g/m2 Final    MV Peak E Jeff 11/17/2024 0.59  m/s Final    TDI LATERAL 11/17/2024 0.09  m/s Final    TDI SEPTAL 11/17/2024 0.06  m/s Final    E/E' ratio 11/17/2024 7.87  m/s Final    MV Peak A Jeff 11/17/2024 0.85  m/s Final    E/A ratio 11/17/2024 0.69   Final    E wave deceleration time 11/17/2024 243.00  msec Final    LV SEPTAL E/E' RATIO 11/17/2024 9.83  m/s Final    LV LATERAL E/E' RATIO 11/17/2024 6.56  m/s Final    LVOT peak jeff 11/17/2024 1.2  m/s Final    Left Ventricular Outflow Tract Basilia* 11/17/2024 0.73  cm/s Final    Left Ventricular Outflow Tract Basilia* 11/17/2024 3.00  mmHg Final    RV S' 11/17/2024 13.50  cm/s Final    TAPSE 11/17/2024 2.21  cm Final    LA size 11/17/2024 3.70  cm Final    LA Vol (MOD) 11/17/2024 41.10  mL Final    CHANI (MOD) 11/17/2024 21.3  mL/m2 Final    RA area length vol 11/17/2024 34.30  mL Final    AV mean gradient 11/17/2024 4.0  mmHg Final    AV peak gradient 11/17/2024 7.8  mmHg Final    Ao peak jeff 11/17/2024 1.4  m/s Final    Ao VTI 11/17/2024 28.4  cm Final    LVOT peak VTI 11/17/2024 23.4  cm Final    AV valve area 11/17/2024 2.6  cm² Final    AV Velocity Ratio 11/17/2024 0.86   Final    AV index (prosthetic) 11/17/2024 0.82   Final    DEANNA by Velocity Ratio 11/17/2024 2.7  cm² Final    MV mean gradient 11/17/2024 1  mmHg Final    MV peak gradient 11/17/2024 5  mmHg Final    MV stenosis pressure 1/2 time 11/17/2024 73.00  ms Final    MV valve area p 1/2 method 11/17/2024 3.01  cm2 Final    MV valve area by continuity eq 11/17/2024 2.51  cm2 Final    MV VTI 11/17/2024 29.3  cm Final    PV PEAK VELOCITY 11/17/2024 1.06  m/s Final    PV peak gradient  11/17/2024 4  mmHg Final    Pulmonary Valve Mean Velocity 11/17/2024 0.71  m/s Final    Ao root annulus 11/17/2024 3.10  cm Final    IVC diameter 11/17/2024 1.30  cm Final    Mean e' 11/17/2024 0.08  m/s Final    ZLVIDS 11/17/2024 -2.67   Final    ZLVIDD 11/17/2024 -4.17   Final    LA area A4C 11/17/2024 13.80  cm2 Final    LA area A2C 11/17/2024 16.60  cm2 Final    AORTIC VALVE CUSP SEPERATION 11/17/2024 2.00  cm Final    BSA 11/17/2024 1.96  m2 Final    Est. RA pres 11/17/2024 3  mmHg Final    POCT Glucose 11/17/2024 209 (H)  70 - 110 mg/dL Final    Sodium 11/18/2024 140  136 - 145 mmol/L Final    Potassium 11/18/2024 4.1  3.5 - 5.1 mmol/L Final    Chloride 11/18/2024 109  95 - 110 mmol/L Final    CO2 11/18/2024 23  23 - 29 mmol/L Final    Glucose 11/18/2024 155 (H)  70 - 110 mg/dL Final    BUN 11/18/2024 26 (H)  8 - 23 mg/dL Final    Creatinine 11/18/2024 1.6 (H)  0.5 - 1.4 mg/dL Final    Calcium 11/18/2024 8.6 (L)  8.7 - 10.5 mg/dL Final    Total Protein 11/18/2024 6.2  6.0 - 8.4 g/dL Final    Albumin 11/18/2024 3.9  3.5 - 5.2 g/dL Final    Total Bilirubin 11/18/2024 0.4  0.1 - 1.0 mg/dL Final    Alkaline Phosphatase 11/18/2024 87  55 - 135 U/L Final    AST 11/18/2024 16  10 - 40 U/L Final    ALT 11/18/2024 16  10 - 44 U/L Final    eGFR 11/18/2024 43.0 (A)  >60 mL/min/1.73 m^2 Final    Anion Gap 11/18/2024 8  8 - 16 mmol/L Final    Magnesium 11/18/2024 2.1  1.6 - 2.6 mg/dL Final    Hemoglobin A1C 11/18/2024 7.3 (H)  4.5 - 6.2 % Final    Estimated Avg Glucose 11/18/2024 163 (H)  68 - 131 mg/dL Final    WBC 11/18/2024 7.28  3.90 - 12.70 K/uL Final    RBC 11/18/2024 4.72  4.60 - 6.20 M/uL Final    Hemoglobin 11/18/2024 14.0  14.0 - 18.0 g/dL Final    Hematocrit 11/18/2024 43.3  40.0 - 54.0 % Final    MCV 11/18/2024 92  82 - 98 fL Final    MCH 11/18/2024 29.7  27.0 - 31.0 pg Final    MCHC 11/18/2024 32.3  32.0 - 36.0 g/dL Final    RDW 11/18/2024 14.5  11.5 - 14.5 % Final    Platelets 11/18/2024 170  150 - 450 K/uL  Final    MPV 11/18/2024 11.3  9.2 - 12.9 fL Final    Immature Granulocytes 11/18/2024 0.8 (H)  0.0 - 0.5 % Final    Gran # (ANC) 11/18/2024 4.1  1.8 - 7.7 K/uL Final    Immature Grans (Abs) 11/18/2024 0.06 (H)  0.00 - 0.04 K/uL Final    Lymph # 11/18/2024 2.3  1.0 - 4.8 K/uL Final    Mono # 11/18/2024 0.7  0.3 - 1.0 K/uL Final    Eos # 11/18/2024 0.1  0.0 - 0.5 K/uL Final    Baso # 11/18/2024 0.08  0.00 - 0.20 K/uL Final    nRBC 11/18/2024 0  0 /100 WBC Final    Gran % 11/18/2024 56.0  38.0 - 73.0 % Final    Lymph % 11/18/2024 31.3  18.0 - 48.0 % Final    Mono % 11/18/2024 9.3  4.0 - 15.0 % Final    Eosinophil % 11/18/2024 1.5  0.0 - 8.0 % Final    Basophil % 11/18/2024 1.1  0.0 - 1.9 % Final    Differential Method 11/18/2024 Automated   Final    aPTT 11/18/2024 30.8  21.0 - 32.0 sec Final    Prothrombin Time 11/18/2024 10.6  9.0 - 12.5 sec Final    INR 11/18/2024 0.9  0.8 - 1.2 Final    POCT Glucose 11/18/2024 171 (H)  70 - 110 mg/dL Final    POCT Glucose 11/18/2024 192 (H)  70 - 110 mg/dL Final   Office Visit on 08/02/2024   Component Date Value Ref Range Status    Creatinine, Urine 11/04/2024 74  20 - 320 mg/dL Final    Protein/Creatinine Ratio 11/04/2024 527 (H)  25 - 148 mg/g creat Final    Protein/Creat Ratio 11/04/2024 0.527 (H)  0.025 - 0.148 mg/mg creat Final    Protein, Total Random Urine 11/04/2024 39 (H)  5 - 25 mg/dL Final    PTH, Intact 11/04/2024 20  16 - 77 pg/mL Final    Glucose 11/04/2024 137 (H)  65 - 99 mg/dL Final    BUN 11/04/2024 29 (H)  7 - 25 mg/dL Final    Creatinine 11/04/2024 1.73 (H)  0.70 - 1.22 mg/dL Final    eGFR 11/04/2024 39 (L)  > OR = 60 mL/min/1.73m2 Final    BUN/Creatinine Ratio 11/04/2024 17  6 - 22 (calc) Final    Sodium 11/04/2024 139  135 - 146 mmol/L Final    Potassium 11/04/2024 4.8  3.5 - 5.3 mmol/L Final    Chloride 11/04/2024 106  98 - 110 mmol/L Final    CO2 11/04/2024 24  20 - 32 mmol/L Final    Calcium 11/04/2024 9.9  8.6 - 10.3 mg/dL Final    Phosphate (as  Phosphorus) 11/04/2024 3.5  2.1 - 4.3 mg/dL Final    Albumin 11/04/2024 4.6  3.6 - 5.1 g/dL Final       Past Medical History:   Diagnosis Date    Diabetes mellitus, type 2     DM type 2 without retinopathy     Hypertension     Kidney stone      Social History     Socioeconomic History    Marital status:    Tobacco Use    Smoking status: Never    Smokeless tobacco: Never   Substance and Sexual Activity    Alcohol use: No    Drug use: No     Social Drivers of Health     Financial Resource Strain: Low Risk  (11/17/2024)    Overall Financial Resource Strain (CARDIA)     Difficulty of Paying Living Expenses: Not hard at all   Food Insecurity: No Food Insecurity (11/17/2024)    Hunger Vital Sign     Worried About Running Out of Food in the Last Year: Never true     Ran Out of Food in the Last Year: Never true   Transportation Needs: No Transportation Needs (11/17/2024)    TRANSPORTATION NEEDS     Transportation : No   Physical Activity: Insufficiently Active (1/22/2024)    Exercise Vital Sign     Days of Exercise per Week: 3 days     Minutes of Exercise per Session: 40 min   Stress: No Stress Concern Present (11/17/2024)    [employer] - Occupational Stress Questionnaire     Feeling of Stress : Not at all   Housing Stability: Low Risk  (11/17/2024)    Housing Stability Vital Sign     Unable to Pay for Housing in the Last Year: No     Homeless in the Last Year: No     Past Surgical History:   Procedure Laterality Date    APPENDECTOMY  5/30/2020    Procedure: APPENDECTOMY;  Surgeon: Eusebio Gil III, MD;  Location: [facility] OR;  Service: General;;    arm fracture surgery      FRACTURE SURGERY      Left arm    LAPAROSCOPIC APPENDECTOMY N/A 5/30/2020    Procedure: APPENDECTOMY, LAPAROSCOPIC VS OPEN;  Surgeon: Eusebio Gil III, MD;  Location: [facility];  Service: General;  Laterality: N/A;    Open Appendectomy w/ partial cecectomy   05/31/2020    Dr. Gil      No family history on  file.    The CVD Risk score (Adithya, et al., 2008) failed to calculate for the following reasons:    The 2008 CVD risk score is only valid for ages 30 to 74    The patient has a prior MI, stroke, CHF, or peripheral vascular disease diagnosis    All of your core healthy metrics are met.      Review of patient's allergies indicates:  No Known Allergies    Current Outpatient Medications:     ascorbic acid, vitamin C, (VITAMIN C) 500 MG tablet, Take 1 tablet by mouth once daily., Disp: , Rfl:     atorvastatin (LIPITOR) 80 MG tablet, Take 1 tablet (80 mg total) by mouth once daily., Disp: 90 tablet, Rfl: 0    blood sugar diagnostic Strp, To check BG 1 times daily, to use with insurance preferred meter, Disp: 100 strip, Rfl: 3    blood-glucose meter kit, To check BG 1 times daily, to use with insurance preferred meter, Disp: 1 each, Rfl: 0    canagliflozin (INVOKANA) 300 mg Tab tablet, Take 1 tablet (300 mg total) by mouth once daily., Disp: 90 tablet, Rfl: 3    ezetimibe (ZETIA) 10 mg tablet, Take 1 tablet (10 mg total) by mouth once daily., Disp: 90 tablet, Rfl: 3    folic acid-vit B6-vit B12 (FOLPLEX 2.2) 2.2-25-0.5 mg Tab, Take 1 tablet by mouth once daily., Disp: 90 each, Rfl: 3    lancets Misc, To check BG 1 times daily, to use with insurance preferred meter, Disp: 100 each, Rfl: 3    metoprolol succinate (TOPROL-XL) 25 MG 24 hr tablet, Take 1 tablet (25 mg total) by mouth once daily., Disp: 90 tablet, Rfl: 3    MULTIVITAMIN ORAL, Take 1 tablet by mouth once daily. , Disp: , Rfl:     omega 3-dha-epa-fish oil 900-1,400 mg CpDR, Take 2 capsules by mouth 2 (two) times daily. , Disp: , Rfl:     pioglitazone (ACTOS) 15 MG tablet, Take 1 tablet (15 mg total) by mouth once daily., Disp: 90 tablet, Rfl: 1    telmisartan (MICARDIS) 40 MG Tab, Take 40 mg by mouth once daily., Disp: , Rfl:     vitamin D (VITAMIN D3) 1000 units Tab, Take 2,000 Units by mouth every evening., Disp: , Rfl:     zinc acetate 50 mg (zinc) Cap,  "Take 1 capsule by mouth Daily., Disp: , Rfl:     apixaban (ELIQUIS) 5 mg Tab, Take 1 tablet (5 mg total) by mouth 2 (two) times daily. (Patient not taking: Reported on 12/17/2024), Disp: 180 tablet, Rfl: 0    aspirin 81 MG Chew, Take 1 tablet (81 mg total) by mouth once daily., Disp: , Rfl:     blood sugar diagnostic (ACCU-CHEK AUDREY PLUS TEST STRP) Strp, 1 strip by Misc.(Non-Drug; Combo Route) route once daily., Disp: 100 strip, Rfl: 1    lisinopriL (PRINIVIL,ZESTRIL) 5 MG tablet, Take 1 tablet (5 mg total) by mouth once daily., Disp: 90 tablet, Rfl: 3    Review of Systems   Constitutional:  Negative for activity change, appetite change, chills, fatigue, fever and unexpected weight change.   HENT:  Negative for ear pain, hearing loss, rhinorrhea and trouble swallowing.    Eyes:  Positive for visual disturbance. Negative for pain and discharge.   Respiratory:  Negative for apnea, cough, chest tightness, shortness of breath and wheezing.    Cardiovascular:  Negative for chest pain, palpitations and leg swelling.   Gastrointestinal:  Negative for abdominal pain, blood in stool, constipation, diarrhea, nausea, vomiting and reflux.   Endocrine: Negative for cold intolerance, heat intolerance, polydipsia and polyuria.   Genitourinary:  Negative for bladder incontinence, difficulty urinating, dysuria, erectile dysfunction, frequency, hematuria, testicular pain and urgency.   Musculoskeletal:  Negative for arthralgias, gait problem, joint swelling, myalgias and neck pain.   Neurological:  Negative for dizziness, seizures, weakness, numbness and headaches.   Psychiatric/Behavioral:  Negative for agitation, behavioral problems, confusion, dysphoric mood and hallucinations. The patient is not nervous/anxious.            Objective:      Vitals:    12/17/24 1031   BP: 118/70   Pulse: 70   SpO2: 98%   Weight: 81.2 kg (179 lb)   Height: 5' 7" (1.702 m)     Physical Exam  Vitals and nursing note reviewed.   Constitutional:       " General: He is not in acute distress.     Appearance: Normal appearance. He is well-developed. He is not toxic-appearing.   HENT:      Head: Normocephalic and atraumatic.      Right Ear: Tympanic membrane and external ear normal.      Left Ear: Tympanic membrane and external ear normal.      Nose: Nose normal.      Mouth/Throat:      Pharynx: Oropharynx is clear. No posterior oropharyngeal erythema.   Eyes:      Pupils: Pupils are equal, round, and reactive to light.   Neck:      Thyroid: No thyromegaly.      Vascular: No carotid bruit.   Cardiovascular:      Rate and Rhythm: Normal rate and regular rhythm.      Heart sounds: Normal heart sounds. No murmur heard.     Comments: Zio heart monitor in place  Pulmonary:      Effort: Pulmonary effort is normal.      Breath sounds: Normal breath sounds. No wheezing or rales.   Abdominal:      General: Bowel sounds are normal. There is no distension.      Palpations: Abdomen is soft.      Tenderness: There is no abdominal tenderness.   Musculoskeletal:         General: No tenderness or deformity. Normal range of motion.      Cervical back: Normal range of motion and neck supple.      Lumbar back: Normal. No spasms.      Comments: Bends 90 degrees at  waist, shoulders and knees have full range of motion, no pitting edema to lower extremities   Lymphadenopathy:      Cervical: No cervical adenopathy.   Skin:     General: Skin is warm and dry.      Findings: No rash.   Neurological:      General: No focal deficit present.      Mental Status: He is alert and oriented to person, place, and time. Mental status is at baseline.      Cranial Nerves: No cranial nerve deficit.      Coordination: Coordination normal.   Psychiatric:         Mood and Affect: Mood normal.         Behavior: Behavior normal.         Thought Content: Thought content normal.         Judgment: Judgment normal.       Physical Exam    Pulmonary: Pulmonary effort is normal. Normal breath sounds. No wheezing. No  rales.  Musculoskeletal: No swelling in legs.  Vitals: Normal blood pressure.             Assessment:       1. Neurologic deficit due to acute ischemic stroke    2. Elevated PSA    3. Controlled type 2 diabetes mellitus with complication, without long-term current use of insulin    4. Primary hypertension    5. Mixed hyperlipidemia    6. Transient diplopia    7. History of carotid endarterectomy    8. Paroxysmal atrial fibrillation    9. Stage 3b chronic kidney disease    10. Nephrolithiasis    11. Diabetic nephropathy associated with diabetes mellitus due to underlying condition    12. Type 2 diabetes mellitus with stage 3b chronic kidney disease, without long-term current use of insulin    13. Carotid artery disease, unspecified laterality, unspecified type         Plan:       Neurologic deficit due to acute ischemic stroke  Small CVA in the [place]an,Dr Skinner  rec  lisinopril addition  Elevated PSA  -     PSA, TOTAL AND FREE; Future; Expected date: 12/17/2024  Last PSA was  7.0  Controlled type 2 diabetes mellitus with complication, without long-term current use of insulin  -     PSA, TOTAL AND FREE; Future; Expected date: 12/17/2024  -     Comprehensive Metabolic Panel; Future; Expected date: 12/17/2024  -     Hemoglobin A1C; Future; Expected date: 12/17/2024  -     Lipid Panel; Future; Expected date: 12/17/2024  -     Microalbumin/Creatinine Ratio, Urine; Future; Expected date: 12/17/2024  -     CBC Auto Differential; Future; Expected date: 12/17/2024  -     Urinalysis Microscopic; Future; Expected date: 12/17/2024  A1C 7.3 recheck again in April  Primary hypertension  BP well controlled  Mixed hyperlipidemia  Lipids  due in April , cont  Atorvastatin 80 mg  hs  Transient diplopia  resolved  History of carotid endarterectomy  Rt [facility] Dr Richardson   Paroxysmal atrial fibrillation  NSR today  Stage 3b chronic kidney disease  Dr Scott Cano following  Nephrolithiasis    Diabetic nephropathy associated with diabetes  mellitus due to underlying condition    Type 2 diabetes mellitus with stage 3b chronic kidney disease, without long-term current use of insulin    Carotid artery disease, unspecified laterality, unspecified type    Other orders  -     lisinopriL (PRINIVIL,ZESTRIL) 5 MG tablet; Take 1 tablet (5 mg total) by mouth once daily.  Dispense: 90 tablet; Refill: 3      Assessment & Plan    IMPRESSION:  - Reviewed patient's recent stroke event in November, noting small posterior lalan infarct on MRI without major deficits  - Assessed current medication regimen, including transition from Pravastatin to Atorvastatin and discontinuation of Eliquis  - Evaluated recent carotid ultrasound and MRA results, showing no significant blockages or aneurysms  - Noted recent Holter monitor placement by Dr. Crisostomo to rule out AFib  - Reviewed blood sugar control, with recent A1C of 7.3  - Considered Dr. Knowles's recommendation for ACE inhibitor addition for stroke prevention    CEREBRAL INFARCTION PREVENTION:  - Explained ACE inhibitor's role in stroke prevention and kidney protection for diabetics.  - Discussed potential side effect of ACE inhibitor (cough).  - Started ACE inhibitor daily for stroke prevention.  - Continued two baby aspirin daily for stroke prevention.    HYPERLIPIDEMIA:  - Continued Atorvastatin 80 mg daily.  - Continued Ezetimibe 10 mg daily.    HYPERTENSION:  - Continued Metoprolol 25 mg daily.    TYPE 2 DIABETES MELLITUS:  - Explained ACE inhibitor's role in stroke prevention and kidney protection for diabetics.    EXERCISE AND ACTIVITY:  - Ruy to continue current exercise regimen of walking almost 2 miles 3 times per week.  - Ruy to maintain yard work activities as tolerated.         Follow up in about 4 months (around 4/17/2025), or CVA, for Diabetic Check-Up.        This note was generated with the assistance of ambient listening technology. Verbal consent was obtained by the patient and accompanying  visitor(s) for the recording of patient appointment to facilitate this note. I attest to having reviewed and edited the generated note for accuracy, though some syntax or spelling errors may persist. Please contact the author of this note for any clarification.      12/17/2024 Rigoberto Bunch

## 2025-01-05 NOTE — PROGRESS NOTES
Subjective:       Patient ID: Ruy Ramos Jr. is a 79 y.o. White male who presents for return patient evaluation for chronic renal failure.      He had a kidney a kidney stone on April 19th, 2 mm and 4 mm.   He had COVID in August on 2020.  His blood glucose has been well controlled.       Review of Systems   Constitutional:  Positive for fatigue. Negative for appetite change, chills and fever.   HENT:  Negative for congestion.    Eyes:  Positive for visual disturbance.   Respiratory:  Positive for cough (chronic since COVID) and shortness of breath (with exertion).    Cardiovascular:  Negative for chest pain and leg swelling.   Gastrointestinal:  Negative for abdominal pain, diarrhea, nausea and vomiting.   Genitourinary:  Negative for difficulty urinating, dysuria and hematuria.   Musculoskeletal:  Positive for arthralgias (B shoulders and fingers). Negative for myalgias.   Skin:  Negative for rash.   Neurological:  Positive for numbness (feet). Negative for dizziness and headaches.   Psychiatric/Behavioral:  Negative for sleep disturbance.        The past medical, family and social histories were reviewed for this encounter.     /66 (BP Location: Left arm, Patient Position: Sitting)     Objective:      Physical Exam  Vitals reviewed.   Constitutional:       General: He is not in acute distress.     Appearance: He is well-developed.   HENT:      Head: Normocephalic and atraumatic.   Eyes:      General: No scleral icterus.  Pulmonary:      Effort: Pulmonary effort is normal. No respiratory distress.   Neurological:      Mental Status: He is alert and oriented to person, place, and time.   Psychiatric:         Mood and Affect: Mood normal.         Behavior: Behavior normal.         Assessment:       1. Stage 3b chronic kidney disease    2. Primary hypertension    3. Type 2 diabetes mellitus with stage 4 chronic kidney disease, without long-term current use of insulin    4. Kidney stone    5.  Acquired cyst of kidney        Lab Results   Component Value Date    CREATININE 1.73 (H) 11/04/2024    BUN 29 (H) 11/04/2024     11/04/2024    K 4.8 11/04/2024     11/04/2024    CO2 24 11/04/2024     Lab Results   Component Value Date    PTH 20 11/04/2024    CALCIUM 9.9 11/04/2024    PHOS 3.0 04/27/2023     Lab Results   Component Value Date    HCT 42.6 06/14/2023     Prot/Creat Ratio, Urine   Date Value Ref Range Status   04/27/2023 0.29 (H) 0.00 - 0.20 Final   02/09/2023 0.25 (H) 0.00 - 0.20 Final   02/01/2023 0.34 (H) 0.00 - 0.20 Final     Plan:   Return to clinic in 6 months.  Labs for next visit include rp, pth, upc per standing orders q 3 months.    Baseline creatinine is 1.5-1.7 prior to 2020.  He has a new baseline since his COVID infection in August of 2020 of 2.0-2.3.  PTH is 20 with a calcium of 9.9.  Renal US shows R 9.2 cm L 9.4 cm.  He sees Urology in [place].  UPC is 0.29 on an ARB.  Your blood pressure is controlled on your current medications.  Keep in mind that weight loss often improves blood pressure.  If you do diet and lose weight we will likely need to adjust your medications.  The effects of diabetes as it relates to kidney function was discussed.  As for the control of your blood sugar, please follow up with your PCP or Endocrinology.  We discussed kidney cysts and that they are common (occur in 50% of patients over 50 years old).  We do like to monitor them with repeat ultrasound ever 1-2 years to document stability.  He did have a ruptured appendix in May of 2020 which could have been the event that affected his kidney function as well as COVID infection that same year in August.    KFRE 2-Year: 0.9% at 11/4/2024 12:00 AM  Calculated from:  Serum Creatinine: 1.73 mg/dL at 11/4/2024 12:00 AM  Urine Albumin Creatinine Ratio: 52 mg/g creat at 6/20/2024  8:07 AM  Age: 79 years  Sex: Male at 11/4/2024 12:00 AM  Has CKD-3 to CKD-5: Yes    KFRE 5-Year: 2.9% at 11/4/2024 12:00  AM  Calculated from:  Serum Creatinine: 1.73 mg/dL at 11/4/2024 12:00 AM  Urine Albumin Creatinine Ratio: 52 mg/g creat at 6/20/2024  8:07 AM  Age: 79 years  Sex: Male at 11/4/2024 12:00 AM  Has CKD-3 to CKD-5: Yes     Trauma

## 2025-01-13 DIAGNOSIS — Z00.00 ENCOUNTER FOR MEDICARE ANNUAL WELLNESS EXAM: ICD-10-CM

## 2025-02-12 RX ORDER — ATORVASTATIN CALCIUM 80 MG/1
80 TABLET, FILM COATED ORAL DAILY
Qty: 90 TABLET | Refills: 3 | Status: SHIPPED | OUTPATIENT
Start: 2025-02-12 | End: 2025-05-13

## 2025-02-12 NOTE — TELEPHONE ENCOUNTER
----- Message from Betty sent at 2/12/2025  8:19 AM CST -----  Refill Atorvastatin  80 mg. [facility] pt's # [phone] GH

## 2025-02-17 DIAGNOSIS — E11.8 CONTROLLED TYPE 2 DIABETES MELLITUS WITH COMPLICATION, WITHOUT LONG-TERM CURRENT USE OF INSULIN: ICD-10-CM

## 2025-02-17 DIAGNOSIS — E11.42 DIABETIC POLYNEUROPATHY ASSOCIATED WITH TYPE 2 DIABETES MELLITUS: ICD-10-CM

## 2025-02-17 RX ORDER — PIOGLITAZONEHYDROCHLORIDE 15 MG/1
15 TABLET ORAL DAILY
Qty: 90 TABLET | Refills: 1 | Status: SHIPPED | OUTPATIENT
Start: 2025-02-17

## 2025-02-17 NOTE — TELEPHONE ENCOUNTER
----- Message from Yolanda sent at 2/17/2025  2:56 PM CST -----  Pt needs a refill for pioglitazone 15mg to be sent to [facility].[phone]

## 2025-02-25 DIAGNOSIS — E11.42 DIABETIC POLYNEUROPATHY ASSOCIATED WITH TYPE 2 DIABETES MELLITUS: ICD-10-CM

## 2025-02-25 RX ORDER — CYANOCOBALAMIN/FOLIC AC/VIT B6 0.5-2.2-25
1 TABLET ORAL DAILY
Qty: 90 EACH | Refills: 3 | Status: SHIPPED | OUTPATIENT
Start: 2025-02-25

## 2025-02-25 NOTE — TELEPHONE ENCOUNTER
----- Message from Susan sent at 2/25/2025  2:13 PM CST -----  [phone]

## 2025-03-10 LAB
LEFT EYE DM RETINOPATHY: NEGATIVE
RIGHT EYE DM RETINOPATHY: NEGATIVE

## 2025-03-11 ENCOUNTER — PATIENT OUTREACH (OUTPATIENT)
Dept: ADMINISTRATIVE | Facility: HOSPITAL | Age: 80
End: 2025-03-11
Payer: MEDICARE

## 2025-03-26 DIAGNOSIS — E08.21 DIABETIC NEPHROPATHY ASSOCIATED WITH DIABETES MELLITUS DUE TO UNDERLYING CONDITION: ICD-10-CM

## 2025-03-26 NOTE — TELEPHONE ENCOUNTER
----- Message from Kath sent at 3/26/2025 10:02 AM CDT -----  Contact: josue  Wife josue calling to refill patient [facility] [phone]

## 2025-03-27 ENCOUNTER — TELEPHONE (OUTPATIENT)
Dept: FAMILY MEDICINE | Facility: CLINIC | Age: 80
End: 2025-03-27
Payer: MEDICARE

## 2025-04-10 ENCOUNTER — OFFICE VISIT (OUTPATIENT)
Dept: NEPHROLOGY | Facility: CLINIC | Age: 80
End: 2025-04-10
Payer: MEDICARE

## 2025-04-10 ENCOUNTER — OFFICE VISIT (OUTPATIENT)
Dept: FAMILY MEDICINE | Facility: CLINIC | Age: 80
End: 2025-04-10
Payer: MEDICARE

## 2025-04-10 VITALS
WEIGHT: 177 LBS | SYSTOLIC BLOOD PRESSURE: 118 MMHG | HEIGHT: 67 IN | DIASTOLIC BLOOD PRESSURE: 64 MMHG | BODY MASS INDEX: 27.78 KG/M2 | HEART RATE: 56 BPM

## 2025-04-10 VITALS
BODY MASS INDEX: 27.78 KG/M2 | HEART RATE: 52 BPM | SYSTOLIC BLOOD PRESSURE: 134 MMHG | DIASTOLIC BLOOD PRESSURE: 68 MMHG | WEIGHT: 177 LBS | HEIGHT: 67 IN | OXYGEN SATURATION: 98 %

## 2025-04-10 DIAGNOSIS — N18.4 TYPE 2 DIABETES MELLITUS WITH STAGE 4 CHRONIC KIDNEY DISEASE, WITHOUT LONG-TERM CURRENT USE OF INSULIN: ICD-10-CM

## 2025-04-10 DIAGNOSIS — I77.9 CAROTID ARTERY DISEASE, UNSPECIFIED LATERALITY, UNSPECIFIED TYPE: ICD-10-CM

## 2025-04-10 DIAGNOSIS — N18.32 STAGE 3B CHRONIC KIDNEY DISEASE: ICD-10-CM

## 2025-04-10 DIAGNOSIS — E11.42 DIABETIC POLYNEUROPATHY ASSOCIATED WITH TYPE 2 DIABETES MELLITUS: ICD-10-CM

## 2025-04-10 DIAGNOSIS — E11.22 TYPE 2 DIABETES MELLITUS WITH STAGE 3B CHRONIC KIDNEY DISEASE, WITHOUT LONG-TERM CURRENT USE OF INSULIN: ICD-10-CM

## 2025-04-10 DIAGNOSIS — E78.2 MIXED HYPERLIPIDEMIA: ICD-10-CM

## 2025-04-10 DIAGNOSIS — N20.0 KIDNEY STONE: ICD-10-CM

## 2025-04-10 DIAGNOSIS — N20.0 NEPHROLITHIASIS: ICD-10-CM

## 2025-04-10 DIAGNOSIS — N40.0 BENIGN PROSTATIC HYPERPLASIA WITHOUT LOWER URINARY TRACT SYMPTOMS: ICD-10-CM

## 2025-04-10 DIAGNOSIS — E08.21 DIABETIC NEPHROPATHY ASSOCIATED WITH DIABETES MELLITUS DUE TO UNDERLYING CONDITION: ICD-10-CM

## 2025-04-10 DIAGNOSIS — H53.2 TRANSIENT DIPLOPIA: ICD-10-CM

## 2025-04-10 DIAGNOSIS — R29.818 NEUROLOGIC DEFICIT DUE TO ACUTE ISCHEMIC STROKE: ICD-10-CM

## 2025-04-10 DIAGNOSIS — E11.22 TYPE 2 DIABETES MELLITUS WITH STAGE 4 CHRONIC KIDNEY DISEASE, WITHOUT LONG-TERM CURRENT USE OF INSULIN: ICD-10-CM

## 2025-04-10 DIAGNOSIS — I63.9 NEUROLOGIC DEFICIT DUE TO ACUTE ISCHEMIC STROKE: ICD-10-CM

## 2025-04-10 DIAGNOSIS — N28.1 ACQUIRED CYST OF KIDNEY: ICD-10-CM

## 2025-04-10 DIAGNOSIS — N18.32 STAGE 3B CHRONIC KIDNEY DISEASE: Primary | ICD-10-CM

## 2025-04-10 DIAGNOSIS — I10 PRIMARY HYPERTENSION: ICD-10-CM

## 2025-04-10 DIAGNOSIS — Z98.890 HISTORY OF CAROTID ENDARTERECTOMY: ICD-10-CM

## 2025-04-10 DIAGNOSIS — E11.8 CONTROLLED TYPE 2 DIABETES MELLITUS WITH COMPLICATION, WITHOUT LONG-TERM CURRENT USE OF INSULIN: Primary | ICD-10-CM

## 2025-04-10 DIAGNOSIS — N18.32 TYPE 2 DIABETES MELLITUS WITH STAGE 3B CHRONIC KIDNEY DISEASE, WITHOUT LONG-TERM CURRENT USE OF INSULIN: ICD-10-CM

## 2025-04-10 PROCEDURE — 99999 PR PBB SHADOW E&M-EST. PATIENT-LVL IV: CPT | Mod: PBBFAC,HCNC,, | Performed by: INTERNAL MEDICINE

## 2025-04-10 RX ORDER — ATORVASTATIN CALCIUM 80 MG/1
80 TABLET, FILM COATED ORAL DAILY
Qty: 90 TABLET | Refills: 3 | Status: SHIPPED | OUTPATIENT
Start: 2025-04-10 | End: 2025-07-09

## 2025-04-10 RX ORDER — ASPIRIN 81 MG/1
2 TABLET ORAL 2 TIMES DAILY
COMMUNITY

## 2025-04-10 RX ORDER — TELMISARTAN 80 MG/1
1 TABLET ORAL DAILY
COMMUNITY
Start: 2025-03-31

## 2025-04-10 RX ORDER — IPRATROPIUM BROMIDE 42 UG/1
SPRAY, METERED NASAL
COMMUNITY
Start: 2025-02-25

## 2025-04-10 RX ORDER — EZETIMIBE 10 MG/1
10 TABLET ORAL DAILY
Qty: 90 TABLET | Refills: 3 | Status: SHIPPED | OUTPATIENT
Start: 2025-04-10

## 2025-04-10 RX ORDER — METOPROLOL SUCCINATE 25 MG/1
25 TABLET, EXTENDED RELEASE ORAL DAILY
Qty: 90 TABLET | Refills: 3 | Status: SHIPPED | OUTPATIENT
Start: 2025-04-10

## 2025-04-10 RX ORDER — PIOGLITAZONEHYDROCHLORIDE 45 MG/1
45 TABLET ORAL DAILY
Qty: 90 TABLET | Refills: 3 | Status: SHIPPED | OUTPATIENT
Start: 2025-04-10 | End: 2026-04-10

## 2025-04-10 RX ORDER — PIOGLITAZONEHYDROCHLORIDE 15 MG/1
15 TABLET ORAL DAILY
Qty: 90 TABLET | Refills: 1 | Status: CANCELLED | OUTPATIENT
Start: 2025-04-10

## 2025-04-10 NOTE — PROGRESS NOTES
"  Subjective:       Patient ID: Ruy Ramos Jr. is a 80 y.o. White male who presents for return patient evaluation for chronic renal failure.      He had a kidney a kidney stone on April 19th, 2 mm and 4 mm.   He had COVID in August on 2020.  His blood glucose has been well controlled but his ARB dose was increased.      Review of Systems   Constitutional:  Positive for fatigue. Negative for appetite change, chills and fever.   HENT:  Negative for congestion.    Eyes:  Positive for visual disturbance.   Respiratory:  Positive for cough (chronic since COVID) and shortness of breath (with exertion).    Cardiovascular:  Negative for chest pain and leg swelling.   Gastrointestinal:  Negative for abdominal pain, diarrhea, nausea and vomiting.   Genitourinary:  Negative for difficulty urinating, dysuria and hematuria.   Musculoskeletal:  Positive for arthralgias (fingers). Negative for myalgias.   Skin:  Negative for rash.   Neurological:  Positive for numbness (feet). Negative for dizziness and headaches.   Psychiatric/Behavioral:  Negative for sleep disturbance.        The past medical, family and social histories were reviewed for this encounter.     /68 (BP Location: Left arm, Patient Position: Sitting)   Pulse (!) 52   Ht 5' 7" (1.702 m)   Wt 80.3 kg (177 lb 0.5 oz)   SpO2 98%   BMI 27.73 kg/m²     Objective:      Physical Exam  Vitals reviewed.   Constitutional:       General: He is not in acute distress.     Appearance: He is well-developed.   HENT:      Head: Normocephalic and atraumatic.   Eyes:      General: No scleral icterus.  Pulmonary:      Effort: Pulmonary effort is normal. No respiratory distress.   Neurological:      Mental Status: He is alert and oriented to person, place, and time.   Psychiatric:         Mood and Affect: Mood normal.         Behavior: Behavior normal.         Assessment:       1. Stage 3b chronic kidney disease    2. Primary hypertension    3. Type 2 diabetes " mellitus with stage 4 chronic kidney disease, without long-term current use of insulin    4. Kidney stone    5. Acquired cyst of kidney        Lab Results   Component Value Date    CREATININE 2.02 (H) 04/03/2025    CREATININE 1.99 (H) 04/03/2025    BUN 32 (H) 04/03/2025    BUN 33 (H) 04/03/2025     04/03/2025     04/03/2025    K 4.7 04/03/2025    K 4.7 04/03/2025     04/03/2025     04/03/2025    CO2 25 04/03/2025    CO2 26 04/03/2025     Lab Results   Component Value Date    PTH 24 04/03/2025    CALCIUM 9.4 04/03/2025    CALCIUM 9.3 04/03/2025    PHOS 3.0 04/27/2023     Lab Results   Component Value Date    HCT 44.0 04/03/2025     Prot/Creat Ratio, Urine   Date Value Ref Range Status   04/27/2023 0.29 (H) 0.00 - 0.20 Final   02/09/2023 0.25 (H) 0.00 - 0.20 Final   02/01/2023 0.34 (H) 0.00 - 0.20 Final     Plan:   Return to clinic in 6 months.  Labs for next visit include rp, pth, upc per standing orders q 3 months.    Baseline creatinine is 1.5-1.7 prior to 2020.  He has a new baseline since his COVID infection in August of 2020 of 2.0-2.3.  PTH is 20 with a calcium of 9.9.  Renal US shows R 9.2 cm L 9.4 cm.  He sees Urology in [place].  UPC is 0.29 on an ARB.  Your blood pressure is controlled on your current medications.  Keep in mind that weight loss often improves blood pressure.  If you do diet and lose weight we will likely need to adjust your medications.  The effects of diabetes as it relates to kidney function was discussed.  As for the control of your blood sugar, please follow up with your PCP or Endocrinology.  We discussed kidney cysts and that they are common (occur in 50% of patients over 50 years old).  We do like to monitor them with repeat ultrasound ever 1-2 years to document stability.  He did have a ruptured appendix in May of 2020 which could have been the event that affected his kidney function as well as COVID infection that same year in August.    KFRE 2-Year: 2.4% at  4/3/2025 12:00 AM  Calculated from:  Serum Creatinine: 2.02 mg/dL at 4/3/2025 12:00 AM  Urine Albumin Creatinine Ratio: 77 mg/g creat at 4/3/2025 12:00 AM  Age: 80 years  Sex: Male at 4/3/2025 12:00 AM  Has CKD-3 to CKD-5: Yes    KFRE 5-Year: 7.3% at 4/3/2025 12:00 AM  Calculated from:  Serum Creatinine: 2.02 mg/dL at 4/3/2025 12:00 AM  Urine Albumin Creatinine Ratio: 77 mg/g creat at 4/3/2025 12:00 AM  Age: 80 years  Sex: Male at 4/3/2025 12:00 AM  Has CKD-3 to CKD-5: Yes

## 2025-04-10 NOTE — PROGRESS NOTES
Subjective:       Patient ID: Ruy Ramos Jr. is a 80 y.o. male.    Chief Complaint: Follow-up (Brought bottles, medications had been changed by cardiologist - updated. Ac )    Follow-up  Pertinent negatives include no arthralgias, chest pain, headaches, joint swelling, neck pain, vomiting or weakness.     History of Present Illness    CHIEF COMPLAINT:  Ruy presents today for follow up.    RECENT SYMPTOMS:  He experienced swollen lymph nodes two weeks ago affecting swallowing ability and causing ear discomfort. Symptoms were associated with dizziness and possible concurrent sinus or ear infection. He denies having sore throat during this episode.    POST-COVID SYMPTOMS:  He reports experiencing persistent post-COVID symptoms for 5 years, including constant runny nose, postnasal drip, and cough present day and night. He uses Ipratropium nasal spray as needed for symptom management, typically once monthly or less frequently based on severity.    DIABETES:  Morning blood sugar was 138-139. A1c is 8.1, increased from previous readings of 7.3, 7.2, and 6.6. Current medications include Invokana 300mg and Pioglitazone 15mg. He walks about two miles daily, taking approximately 45 minutes.    CARDIOVASCULAR:  History of mild stroke in November with associated double vision and dizziness. He currently experiences occasional dizziness with positional changes and [place], but denies double vision, speech difficulties, or prolonged vertigo episodes. Cardiac history notable for single coronary artery blockage at 20%, not requiring stent placement. History of severe hypercholesterolemia with cholesterol levels reaching approximately 900 and triglycerides of 1200 thirty years ago.    MUSCULOSKELETAL:  History of bilateral shoulder problems treated with injections with improvement, now only experiencing pain with certain sleep positions. He reports chronic neck problems for approximately 50 years with a large  disc issue present for 36-50 years. He acknowledges that surgical intervention was indicated in the past but has been managing without it.      ROS:  Constitutional: -appetite change, -chills, -fatigue, -fever, -unexpected weight change  HENT: +ear pain, -trouble swallowing, +ear pressure, +runny nose, +post nasal drip  Eyes: -pain, -discharge, -visual disturbance  Respiratory: -apnea, +cough, -shortness of breath, -wheezing  Cardiovascular: -chest pain, -leg swelling  Gastrointestinal: -abdominal pain, -blood in stool, -constipation, -diarrhea, -nausea, -vomiting, -reflux  Endocrine: -cold intolerance, -heat intolerance, -polydipsia  Genitourinary: -bladder incontinence, -dysuria, -erectile dysfunction, -frequency, -hematuria, -testicular pain, -urgency, -nocturia  Musculoskeletal: -gait problem, -joint swelling, -myalgia  Neurological: +dizziness, -seizures, +numbness, +orthostatic symptoms, +tingling, +lightheadedness  Psychiatric/Behavioral: -agitation, -hallucinations, -nervous, -anxiety symptoms  Lymphatics: +swollen lymph nodes  Neck: +neck pain         Past Medical History:   Diagnosis Date    Diabetes mellitus, type 2     DM type 2 without retinopathy     Hypertension     Kidney stone      Past Surgical History:   Procedure Laterality Date    APPENDECTOMY  5/30/2020    Procedure: APPENDECTOMY;  Surgeon: Eusebio Gil III, MD;  Location: [facility];  Service: General;;    arm fracture surgery      FRACTURE SURGERY      Left arm    LAPAROSCOPIC APPENDECTOMY N/A 5/30/2020    Procedure: APPENDECTOMY, LAPAROSCOPIC VS OPEN;  Surgeon: Eusebio Gil III, MD;  Location: [facility] OR;  Service: General;  Laterality: N/A;    Open Appendectomy w/ partial cecectomy   05/31/2020    Dr. Gil      No family history on file.    Marital Status:   Alcohol History:  reports no history of alcohol use.  Tobacco History:  reports that he has never smoked. He has never used smokeless tobacco.  Drug History:  reports no  "history of drug use.    Review of patient's allergies indicates:  No Known Allergies  Current Medications[1]    Review of Systems   Constitutional:  Negative for activity change and unexpected weight change.   HENT:  Negative for hearing loss, rhinorrhea and trouble swallowing.    Eyes:  Negative for discharge and visual disturbance.   Respiratory:  Negative for chest tightness and wheezing.    Cardiovascular:  Negative for chest pain and palpitations.   Gastrointestinal:  Negative for blood in stool, constipation, diarrhea and vomiting.   Endocrine: Negative for polydipsia and polyuria.   Genitourinary:  Negative for difficulty urinating, hematuria and urgency.   Musculoskeletal:  Negative for arthralgias, joint swelling and neck pain.   Neurological:  Negative for weakness and headaches.   Psychiatric/Behavioral:  Negative for confusion and dysphoric mood.            Objective:      Vitals:    04/10/25 1342   BP: 118/64   Pulse: (!) 56   Weight: 80.3 kg (177 lb)   Height: 5' 7" (1.702 m)     Body mass index is 27.72 kg/m².  Physical Exam  Vitals and nursing note reviewed.   Constitutional:       General: He is not in acute distress.     Appearance: Normal appearance. He is well-developed. He is not toxic-appearing.   HENT:      Head: Normocephalic and atraumatic.      Right Ear: Tympanic membrane and external ear normal.      Left Ear: Tympanic membrane and external ear normal.      Nose: Nose normal.      Mouth/Throat:      Pharynx: Oropharynx is clear. No posterior oropharyngeal erythema.   Eyes:      Pupils: Pupils are equal, round, and reactive to light.   Neck:      Thyroid: No thyromegaly.      Vascular: No carotid bruit.   Cardiovascular:      Rate and Rhythm: Normal rate and regular rhythm.      Heart sounds: Normal heart sounds. No murmur heard.  Pulmonary:      Effort: Pulmonary effort is normal.      Breath sounds: Normal breath sounds. No wheezing or rales.   Abdominal:      General: Bowel sounds are " normal. There is no distension.      Palpations: Abdomen is soft.      Tenderness: There is no abdominal tenderness.   Musculoskeletal:         General: No tenderness or deformity. Normal range of motion.      Cervical back: Normal range of motion and neck supple.      Lumbar back: Normal. No spasms.      Comments: Bends 90 degrees at  waist, shoulders and knees have full range of motion, no pitting edema to lower extremities   Lymphadenopathy:      Cervical: No cervical adenopathy.   Skin:     General: Skin is warm and dry.      Findings: No rash.   Neurological:      General: No focal deficit present.      Mental Status: He is alert and oriented to person, place, and time. Mental status is at baseline.      Cranial Nerves: No cranial nerve deficit.      Coordination: Coordination normal.   Psychiatric:         Mood and Affect: Mood normal.         Behavior: Behavior normal.         Thought Content: Thought content normal.         Judgment: Judgment normal.       Physical Exam    Cardiovascular: Good circulation in feet.  Musculoskeletal: No tenderness. No deformity. Normal range of motion.  Cervical Back: Normal range of motion.  Neurological: Good sensation in toes.             LEFT: normal DP and PT pulses, no trophic changes or ulcerative lesions, and normal sensory exam    RIGHT: normal DP and PT pulses, no trophic changes or ulcerative lesions, and normal sensory exam    Assessment:       1. Controlled type 2 diabetes mellitus with complication, without long-term current use of insulin    2. Diabetic nephropathy associated with diabetes mellitus due to underlying condition    3. History of carotid endarterectomy    4. Mixed hyperlipidemia    5. Diabetic polyneuropathy associated with type 2 diabetes mellitus    6. Neurologic deficit due to acute ischemic stroke    7. Transient diplopia    8. Carotid artery disease, unspecified laterality, unspecified type    9. Primary hypertension    10. Nephrolithiasis     11. Stage 3b chronic kidney disease    12. Type 2 diabetes mellitus with stage 3b chronic kidney disease, without long-term current use of insulin    13. Benign prostatic hyperplasia without lower urinary tract symptoms        Plan:       Controlled type 2 diabetes mellitus with complication, without long-term current use of insulin  -     ezetimibe (ZETIA) 10 mg tablet; Take 1 tablet (10 mg total) by mouth once daily.  Dispense: 90 tablet; Refill: 3  -     metoprolol succinate (TOPROL-XL) 25 MG 24 hr tablet; Take 1 tablet (25 mg total) by mouth once daily.  Dispense: 90 tablet; Refill: 3  -     pioglitazone (ACTOS) 45 MG tablet; Take 1 tablet (45 mg total) by mouth once daily.  Dispense: 90 tablet; Refill: 3  He rising higher 7.2 up to 8.1.  Increase pioglitazone from 15 mg up to 45 mg daily.  Diabetic nephropathy associated with diabetes mellitus due to underlying condition  -     canagliflozin (INVOKANA) 300 mg Tab tablet; Take 1 tablet (300 mg total) by mouth once daily.  Dispense: 90 tablet; Refill: 3  Continue Invokana  History of carotid endarterectomy  -     ezetimibe (ZETIA) 10 mg tablet; Take 1 tablet (10 mg total) by mouth once daily.  Dispense: 90 tablet; Refill: 3    Mixed hyperlipidemia  -     atorvastatin (LIPITOR) 80 MG tablet; Take 1 tablet (80 mg total) by mouth once daily.  Dispense: 90 tablet; Refill: 3  -     ezetimibe (ZETIA) 10 mg tablet; Take 1 tablet (10 mg total) by mouth once daily.  Dispense: 90 tablet; Refill: 3  Cholesterol excellent at 107 HDL 33  LDL 49  Diabetic polyneuropathy associated with type 2 diabetes mellitus  Rarely has tingling in the feet  Neurologic deficit due to acute ischemic stroke  Resolved since stroke, no more vertigo  Transient diplopia  Vision is back to normal  Carotid artery disease, unspecified laterality, unspecified type    Primary hypertension  Blood pressure well controlled  Nephrolithiasis  Asymptomatic  Stage 3b chronic kidney disease  Followed  by Dr. Scott Cano, GFR now 32 and stable  Type 2 diabetes mellitus with stage 3b chronic kidney disease, without long-term current use of insulin    Benign prostatic hyperplasia without lower urinary tract symptoms  PSA 0.9 follow-up with Dr. 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  - GFR stable at 32, previously as low as 28 a year ago.  - Cholesterol levels significantly improved from historical highs (previously 900, now 107).  - Considered history of mild stroke and current cardiovascular status.  - PSA remain low at 0.9.  - Demonstrated good sensory function in feet.    TO IMPROVE DIABETES MANAGEMENT:  - 1.  - Increased Pioglitazone dosage from 15 mg to 45 mg daily. 2.  - Continued Invokana 300 mg. 3.  - Advised against using NSAIDs, recommending acetaminophen for pain relief if needed. 4.  - Discussed need for better diet choices with patient.    PLAN:  - 1.  - Share lab results with the nephrologist. 2.  - Schedule follow up in 6 months for reassessment of diabetes management and A1c levels.    SEQUELAE OF CEREBRAL INFARCTION:  - Monitor patient's reports of occasional dizziness and feeling 'funny' in the head.  - No recurrence of diplopia or dysarthria noted.  - Ruy discontinued from apixaban after the stroke.  - Increased atorvastatin dose to 80 mg post-mild stroke.  - Instruct patient to remain vigilant for symptoms of stroke or  TIA, such as dizziness or visual disturbances.    ATHEROSCLEROTIC HEART DISEASE:  - Ruy reports no chest pain during walks or other activities.  - Previous echocardiogram showed good heart function with 60-65% EF.  - Angiogram revealed one blockage at 20%.    HYPERLIPIDEMIA:  - Continue atorvastatin 80 mg and ezetimibe 10 mg for cholesterol management.  - Current cholesterol level at 107, showing significant improvement from patient's history of extremely high cholesterol (900) and triglycerides (1200) about 30 years ago.    POST-COVID CONDITION AND CHRONIC RHINITIS:  - Ruy reports constant rhinorrhea, postnasal drip, and cough since having COVID-19 five years ago.  - Continue ipratropium nasal spray as needed for nasal symptoms.    SHOULDER PAIN:  - Ruy reports occasional shoulder pain when sleeping in certain positions.  - Good range of motion in shoulders observed.  - Note history of corticosteroid injections in both shoulders.    CERVICAL DISC DEGENERATION:  - Monitor patient's report of long-standing cervical issues.    LYMPHADENOPATHY:  - Ruy reported lymphadenopathy a few weeks ago, which has since improved.    GENERAL HEALTH RECOMMENDATIONS:  - Continue current exercise regimen of walking 2 miles (approximately 45 minutes) 3 times per week.  - Recommend reducing sweets and high-carbohydrate foods, particularly avoiding candy bars after dinner.       Follow up in about 6 months (around 10/10/2025), or CVA, for Diabetic Check-Up.        This note was generated with the assistance of ambient listening technology. Verbal consent was obtained by the patient and accompanying visitor(s) for the recording of patient appointment to facilitate this note. I attest to having reviewed and edited the generated note for accuracy, though some syntax or spelling errors may persist. Please contact the author of this note for any clarification.      4/10/2025 Rigoberto Bunch M.D.           [1]   Current  Outpatient Medications:     folic acid-vit B6-vit B12 (FOLPLEX 2.2) 2.2-25-0.5 mg Tab, Take 1 tablet by mouth once daily., Disp: 90 each, Rfl: 3    ipratropium (ATROVENT) 42 mcg (0.06 %) nasal spray, by Each Nostril route., Disp: , Rfl:     pioglitazone (ACTOS) 15 MG tablet, Take 1 tablet (15 mg total) by mouth once daily., Disp: 90 tablet, Rfl: 1    telmisartan (MICARDIS) 80 MG Tab, Take 1 tablet by mouth once daily., Disp: , Rfl:     ascorbic acid, vitamin C, (VITAMIN C) 500 MG tablet, Take 1 tablet by mouth once daily., Disp: , Rfl:     aspirin (ECOTRIN) 81 MG EC tablet, Take 2 tablets by mouth 2 (two) times daily., Disp: , Rfl:     atorvastatin (LIPITOR) 80 MG tablet, Take 1 tablet (80 mg total) by mouth once daily., Disp: 90 tablet, Rfl: 3    blood sugar diagnostic Strp, To check BG 1 times daily, to use with insurance preferred meter, Disp: 100 strip, Rfl: 3    blood-glucose meter kit, To check BG 1 times daily, to use with insurance preferred meter, Disp: 1 each, Rfl: 0    canagliflozin (INVOKANA) 300 mg Tab tablet, Take 1 tablet (300 mg total) by mouth once daily., Disp: 90 tablet, Rfl: 3    ezetimibe (ZETIA) 10 mg tablet, Take 1 tablet (10 mg total) by mouth once daily., Disp: 90 tablet, Rfl: 3    lancets Misc, To check BG 1 times daily, to use with insurance preferred meter, Disp: 100 each, Rfl: 3    metoprolol succinate (TOPROL-XL) 25 MG 24 hr tablet, Take 1 tablet (25 mg total) by mouth once daily., Disp: 90 tablet, Rfl: 3    MULTIVITAMIN ORAL, Take 1 tablet by mouth once daily. , Disp: , Rfl:     omega 3-dha-epa-fish oil 900-1,400 mg CpDR, Take 2 capsules by mouth 2 (two) times daily. , Disp: , Rfl:     pioglitazone (ACTOS) 45 MG tablet, Take 1 tablet (45 mg total) by mouth once daily., Disp: 90 tablet, Rfl: 3    vitamin D (VITAMIN D3) 1000 units Tab, Take 2,000 Units by mouth every evening., Disp: , Rfl:     zinc acetate 50 mg (zinc) Cap, Take 1 capsule by mouth Daily., Disp: , Rfl:

## 2025-04-29 ENCOUNTER — TELEPHONE (OUTPATIENT)
Dept: FAMILY MEDICINE | Facility: CLINIC | Age: 80
End: 2025-04-29
Payer: MEDICARE

## 2025-04-29 NOTE — TELEPHONE ENCOUNTER
[facility] requesting additional code for PSA Added [id number] to billing trailer. Elevated PSA already listed.

## 2025-05-21 ENCOUNTER — PATIENT MESSAGE (OUTPATIENT)
Dept: NEPHROLOGY | Facility: CLINIC | Age: 80
End: 2025-05-21
Payer: MEDICARE

## 2025-05-21 DIAGNOSIS — N18.32 STAGE 3B CHRONIC KIDNEY DISEASE: Primary | ICD-10-CM

## 2025-06-23 ENCOUNTER — OFFICE VISIT (OUTPATIENT)
Dept: URGENT CARE | Facility: CLINIC | Age: 80
End: 2025-06-23
Payer: MEDICARE

## 2025-06-23 VITALS
OXYGEN SATURATION: 99 % | RESPIRATION RATE: 14 BRPM | HEART RATE: 60 BPM | DIASTOLIC BLOOD PRESSURE: 84 MMHG | TEMPERATURE: 98 F | WEIGHT: 180 LBS | BODY MASS INDEX: 28.25 KG/M2 | SYSTOLIC BLOOD PRESSURE: 177 MMHG | HEIGHT: 67 IN

## 2025-06-23 DIAGNOSIS — H66.92 ACUTE LEFT OTITIS MEDIA: Primary | ICD-10-CM

## 2025-06-23 PROCEDURE — 99203 OFFICE O/P NEW LOW 30 MIN: CPT | Mod: S$GLB,,, | Performed by: NURSE PRACTITIONER

## 2025-06-23 RX ORDER — OFLOXACIN 3 MG/ML
5 SOLUTION AURICULAR (OTIC) DAILY
Qty: 5 ML | Refills: 0 | Status: SHIPPED | OUTPATIENT
Start: 2025-06-23 | End: 2025-06-28

## 2025-06-23 RX ORDER — AMOXICILLIN 500 MG/1
500 TABLET, FILM COATED ORAL EVERY 12 HOURS
Qty: 20 TABLET | Refills: 0 | Status: SHIPPED | OUTPATIENT
Start: 2025-06-23 | End: 2025-07-03

## 2025-06-23 NOTE — PROGRESS NOTES
"Subjective:      Patient ID: Ruy Ramos Jr. is a 80 y.o. male.    Vitals:  height is 5' 7" (1.702 m) and weight is 81.6 kg (180 lb). His temperature is 98.2 °F (36.8 °C). His blood pressure is 177/84 (abnormal) and his pulse is 60. His respiration is 14 and oxygen saturation is 99%.     Chief Complaint: Otalgia    Patient is an 80 year old male who presents to clinic today for evaluation of left ear pain for the past few days. Patient states he was doing some pressure washing and got some water in it. Patient reports it has also been painful to lie on that ear.     Otalgia   There is pain in the left ear. The current episode started in the past 7 days (2 days). The problem has been unchanged. There has been no fever. Pertinent negatives include no abdominal pain, coughing, diarrhea, headaches, rash, sore throat or vomiting. He has tried nothing for the symptoms.       Constitution: Negative for chills, sweating, fatigue and fever.   HENT:  Positive for ear pain. Negative for congestion and sore throat.    Neck: neck negative.   Cardiovascular: Negative.  Negative for chest pain and palpitations.   Eyes: Negative.    Respiratory: Negative.  Negative for chest tightness, cough, shortness of breath and wheezing.    Gastrointestinal: Negative.  Negative for abdominal pain, nausea, vomiting and diarrhea.   Endocrine: negative.   Genitourinary: Negative.    Musculoskeletal: Negative.  Negative for muscle ache.   Skin: Negative.  Negative for color change, pale, rash and erythema.   Allergic/Immunologic: Negative.    Neurological: Negative.  Negative for dizziness, light-headedness, passing out, headaches, disorientation and altered mental status.   Hematologic/Lymphatic: Negative.    Psychiatric/Behavioral: Negative.  Negative for altered mental status, disorientation and confusion.       Objective:     Physical Exam   Constitutional: He is oriented to person, place, and time. He appears well-developed. He is " cooperative.  Non-toxic appearance. He does not appear ill. No distress.   HENT:   Head: Normocephalic and atraumatic.   Ears:   Right Ear: Hearing, tympanic membrane, external ear and ear canal normal.   Left Ear: Hearing, external ear and ear canal normal. There is swelling and tenderness. Tympanic membrane is injected and erythematous.   Nose: Nose normal. No mucosal edema, rhinorrhea, nasal deformity or congestion. No epistaxis. Right sinus exhibits no maxillary sinus tenderness and no frontal sinus tenderness. Left sinus exhibits no maxillary sinus tenderness and no frontal sinus tenderness.   Mouth/Throat: Uvula is midline, oropharynx is clear and moist and mucous membranes are normal. Mucous membranes are moist. No trismus in the jaw. Normal dentition. No uvula swelling. No oropharyngeal exudate or posterior oropharyngeal erythema. Oropharynx is clear.   Eyes: Conjunctivae and lids are normal. Pupils are equal, round, and reactive to light. Right eye exhibits no discharge. Left eye exhibits no discharge. No scleral icterus.   Neck: Trachea normal and phonation normal. Neck supple. No neck rigidity present.   Cardiovascular: Normal rate, regular rhythm, normal heart sounds and normal pulses.   Pulmonary/Chest: Effort normal and breath sounds normal. No respiratory distress. He has no wheezes. He has no rhonchi. He has no rales.   Abdominal: Normal appearance and bowel sounds are normal. He exhibits no distension. Soft. There is no abdominal tenderness.   Musculoskeletal: Normal range of motion.         General: Normal range of motion.      Cervical back: He exhibits no tenderness.   Lymphadenopathy:     He has no cervical adenopathy.   Neurological: He is alert and oriented to person, place, and time. He exhibits normal muscle tone.   Skin: Skin is warm, dry, intact, not diaphoretic, not pale and no rash. Capillary refill takes less than 2 seconds. No erythema   Psychiatric: His speech is normal and behavior  is normal. Judgment and thought content normal.   Nursing note and vitals reviewed.      Assessment:     1. Acute left otitis media        Plan:       Acute left otitis media    Other orders  -     amoxicillin (AMOXIL) 500 MG Tab; Take 1 tablet (500 mg total) by mouth every 12 (twelve) hours. for 10 days  Dispense: 20 tablet; Refill: 0  -     ofloxacin (FLOXIN) 0.3 % otic solution; Place 5 drops into the left ear once daily. for 5 days  Dispense: 5 mL; Refill: 0                    Take medications as prescribed.  Water precautions.  Tylenol/Motrin per package instructions for any pain or fever.  Follow-up with PCP in 1-2 days.  Follow-up with ENT if symptoms not better within 1-2 weeks.  To ED for any new or acutely worsening symptoms.

## 2025-06-26 ENCOUNTER — TELEPHONE (OUTPATIENT)
Dept: URGENT CARE | Facility: CLINIC | Age: 80
End: 2025-06-26
Payer: MEDICARE

## 2025-07-10 ENCOUNTER — HOSPITAL ENCOUNTER (OUTPATIENT)
Dept: RADIOLOGY | Facility: HOSPITAL | Age: 80
Discharge: HOME | End: 2025-07-10
Attending: SPECIALIST
Payer: MEDICARE

## 2025-07-10 DIAGNOSIS — N20.0 KIDNEY STONE: ICD-10-CM

## 2025-07-10 DIAGNOSIS — N20.0 KIDNEY STONE: Primary | ICD-10-CM

## 2025-07-10 PROCEDURE — 74018 RADEX ABDOMEN 1 VIEW: CPT | Mod: TC,PO

## 2025-07-10 PROCEDURE — 74018 RADEX ABDOMEN 1 VIEW: CPT | Mod: 26,,, | Performed by: RADIOLOGY

## (undated) DEVICE — BINDER 9 3-PANEL 30-45

## (undated) DEVICE — GLOVE BIOGEL PI ULTRA TOUCH GRAY SZ7.5

## (undated) DEVICE — RELOAD LINEAR TCR75

## (undated) DEVICE — SUTURE PDS PLUS 0 L60IN ABSORBABLE VIOLET CTX L48MM 1/2 CIRC

## (undated) DEVICE — CUTTER LINEAR TLC75

## (undated) DEVICE — SUTURE ETHIBOND 0 MO-6 18 CX45D

## (undated) DEVICE — SWABSTICK BENZOIN S42450

## (undated) DEVICE — DRESSING POST OP MEPILEX  AG 4X10

## (undated) DEVICE — SUCTION/IRRIGATOR W/TIP

## (undated) DEVICE — TIP BOVIE 6.5 0014

## (undated) DEVICE — TIP BOVIE TEFLON E1450X

## (undated) DEVICE — STERISTRIP 1/4 SKIN CLOSURE

## (undated) DEVICE — RETRIEVER ENDOPOUCH SPEC BAG

## (undated) DEVICE — LIGASURE MARYLAND LF1937 REPLACES LF1737

## (undated) DEVICE — DRESSING MEPORE 2.5 X 3   670800

## (undated) DEVICE — PAD BOVIE ADULT

## (undated) DEVICE — DRAIN FLAT 3/4 100CC 0071370

## (undated) DEVICE — TROCAR OPTICAL ZTHREAD 12MMX100MM CTF73

## (undated) DEVICE — SPONGE LAP 18X18

## (undated) DEVICE — UNDERGLOVE BIOGEL PI MICRO BLUE SZ 6.5

## (undated) DEVICE — TROCAR OPTICAL ZTHREAD 5MMX100MM CTF03

## (undated) DEVICE — TROCAR BALL. BLNT HASSON C0R47

## (undated) DEVICE — STAPLER SKIN NON ROTATE PXW35

## (undated) DEVICE — SOLUTION IRRI H2O BOTTLE 1000ML

## (undated) DEVICE — SLEEVE TROCAR 5MMX100MM  CTS02

## (undated) DEVICE — SPONGE DRAIN 4X4 441407

## (undated) DEVICE — SUTURE ETHILON 2-0 PS 18 585H

## (undated) DEVICE — CUTTER LINEAR FLEX ARTICNG 45MM ATS45

## (undated) DEVICE — SUTURE MONOCRYL 4-0 PS-2 27 MCP426H

## (undated) DEVICE — TRAY GENERAL LAPAROSCOPY

## (undated) DEVICE — Device

## (undated) DEVICE — SOLUTION IRRI NS BOTTLE 1000ML R5200-01

## (undated) DEVICE — UNDERGLOVE BIOGEL PI MICRO BLUE SZ 8

## (undated) DEVICE — SOLUTION NACL 0.9% 3000ML